# Patient Record
Sex: FEMALE | Race: WHITE | Employment: UNEMPLOYED | URBAN - METROPOLITAN AREA
[De-identification: names, ages, dates, MRNs, and addresses within clinical notes are randomized per-mention and may not be internally consistent; named-entity substitution may affect disease eponyms.]

---

## 2021-01-01 ENCOUNTER — APPOINTMENT (OUTPATIENT)
Dept: NUCLEAR MEDICINE | Age: 86
DRG: 064 | End: 2021-01-01
Attending: INTERNAL MEDICINE
Payer: MEDICARE

## 2021-01-01 ENCOUNTER — APPOINTMENT (OUTPATIENT)
Dept: NON INVASIVE DIAGNOSTICS | Age: 86
DRG: 064 | End: 2021-01-01
Attending: INTERNAL MEDICINE
Payer: MEDICARE

## 2021-01-01 ENCOUNTER — HOSPITAL ENCOUNTER (INPATIENT)
Age: 86
LOS: 15 days | Discharge: NURSING, REHAB OR LONG-TERM CARE | DRG: 064 | End: 2021-12-14
Attending: STUDENT IN AN ORGANIZED HEALTH CARE EDUCATION/TRAINING PROGRAM | Admitting: INTERNAL MEDICINE
Payer: MEDICARE

## 2021-01-01 ENCOUNTER — APPOINTMENT (OUTPATIENT)
Dept: CT IMAGING | Age: 86
DRG: 064 | End: 2021-01-01
Attending: INTERNAL MEDICINE
Payer: MEDICARE

## 2021-01-01 ENCOUNTER — APPOINTMENT (OUTPATIENT)
Dept: CT IMAGING | Age: 86
DRG: 064 | End: 2021-01-01
Attending: STUDENT IN AN ORGANIZED HEALTH CARE EDUCATION/TRAINING PROGRAM
Payer: MEDICARE

## 2021-01-01 ENCOUNTER — APPOINTMENT (OUTPATIENT)
Dept: VASCULAR SURGERY | Age: 86
DRG: 064 | End: 2021-01-01
Attending: PSYCHIATRY & NEUROLOGY
Payer: MEDICARE

## 2021-01-01 ENCOUNTER — APPOINTMENT (OUTPATIENT)
Dept: GENERAL RADIOLOGY | Age: 86
DRG: 064 | End: 2021-01-01
Attending: STUDENT IN AN ORGANIZED HEALTH CARE EDUCATION/TRAINING PROGRAM
Payer: MEDICARE

## 2021-01-01 ENCOUNTER — APPOINTMENT (OUTPATIENT)
Dept: MRI IMAGING | Age: 86
DRG: 064 | End: 2021-01-01
Attending: INTERNAL MEDICINE
Payer: MEDICARE

## 2021-01-01 VITALS
HEIGHT: 64 IN | SYSTOLIC BLOOD PRESSURE: 110 MMHG | DIASTOLIC BLOOD PRESSURE: 67 MMHG | RESPIRATION RATE: 20 BRPM | TEMPERATURE: 97.4 F | BODY MASS INDEX: 18.03 KG/M2 | OXYGEN SATURATION: 95 % | WEIGHT: 105.6 LBS | HEART RATE: 101 BPM

## 2021-01-01 DIAGNOSIS — R47.1 DYSARTHRIA: ICD-10-CM

## 2021-01-01 DIAGNOSIS — Z71.89 DNR (DO NOT RESUSCITATE) DISCUSSION: ICD-10-CM

## 2021-01-01 DIAGNOSIS — E78.2 MIXED HYPERLIPIDEMIA: ICD-10-CM

## 2021-01-01 DIAGNOSIS — A41.9 SEPSIS, DUE TO UNSPECIFIED ORGANISM, UNSPECIFIED WHETHER ACUTE ORGAN DYSFUNCTION PRESENT (HCC): ICD-10-CM

## 2021-01-01 DIAGNOSIS — N28.9 ACUTE RENAL INSUFFICIENCY: ICD-10-CM

## 2021-01-01 DIAGNOSIS — Z51.5 PALLIATIVE CARE ENCOUNTER: ICD-10-CM

## 2021-01-01 DIAGNOSIS — R64 CACHEXIA (HCC): ICD-10-CM

## 2021-01-01 DIAGNOSIS — I63.9 ACUTE CVA (CEREBROVASCULAR ACCIDENT) (HCC): ICD-10-CM

## 2021-01-01 DIAGNOSIS — R13.10 DYSPHAGIA, UNSPECIFIED TYPE: ICD-10-CM

## 2021-01-01 DIAGNOSIS — Z71.89 GOALS OF CARE, COUNSELING/DISCUSSION: ICD-10-CM

## 2021-01-01 DIAGNOSIS — D72.829 LEUKOCYTOSIS, UNSPECIFIED TYPE: ICD-10-CM

## 2021-01-01 DIAGNOSIS — R77.8 ELEVATED TROPONIN: ICD-10-CM

## 2021-01-01 DIAGNOSIS — R41.82 ALTERED MENTAL STATUS, UNSPECIFIED ALTERED MENTAL STATUS TYPE: Primary | ICD-10-CM

## 2021-01-01 DIAGNOSIS — E63.9 POOR NUTRITION: ICD-10-CM

## 2021-01-01 DIAGNOSIS — R53.1 WEAKNESS GENERALIZED: ICD-10-CM

## 2021-01-01 DIAGNOSIS — Z71.89 ADVANCED CARE PLANNING/COUNSELING DISCUSSION: ICD-10-CM

## 2021-01-01 DIAGNOSIS — D72.828 OTHER ELEVATED WHITE BLOOD CELL (WBC) COUNT: ICD-10-CM

## 2021-01-01 DIAGNOSIS — Z71.1 CONCERN ABOUT END OF LIFE: ICD-10-CM

## 2021-01-01 DIAGNOSIS — G93.41 ACUTE METABOLIC ENCEPHALOPATHY: ICD-10-CM

## 2021-01-01 DIAGNOSIS — E86.0 DEHYDRATION: ICD-10-CM

## 2021-01-01 DIAGNOSIS — I50.82 BIVENTRICULAR CHF (CONGESTIVE HEART FAILURE) (HCC): ICD-10-CM

## 2021-01-01 LAB
ALBUMIN SERPL-MCNC: 1.7 G/DL (ref 3.5–5)
ALBUMIN SERPL-MCNC: 1.9 G/DL (ref 3.5–5)
ALBUMIN SERPL-MCNC: 2.6 G/DL (ref 3.5–5)
ALBUMIN SERPL-MCNC: 2.9 G/DL (ref 3.5–5)
ALBUMIN/GLOB SERPL: 0.4 {RATIO} (ref 1.1–2.2)
ALBUMIN/GLOB SERPL: 0.4 {RATIO} (ref 1.1–2.2)
ALBUMIN/GLOB SERPL: 0.5 {RATIO} (ref 1.1–2.2)
ALBUMIN/GLOB SERPL: 0.5 {RATIO} (ref 1.1–2.2)
ALBUMIN/GLOB SERPL: 0.6 {RATIO} (ref 1.1–2.2)
ALBUMIN/GLOB SERPL: 0.7 {RATIO} (ref 1.1–2.2)
ALP SERPL-CCNC: 154 U/L (ref 45–117)
ALP SERPL-CCNC: 180 U/L (ref 45–117)
ALP SERPL-CCNC: 186 U/L (ref 45–117)
ALP SERPL-CCNC: 263 U/L (ref 45–117)
ALP SERPL-CCNC: 95 U/L (ref 45–117)
ALP SERPL-CCNC: 98 U/L (ref 45–117)
ALT SERPL-CCNC: 27 U/L (ref 12–78)
ALT SERPL-CCNC: 31 U/L (ref 12–78)
ALT SERPL-CCNC: 33 U/L (ref 12–78)
ALT SERPL-CCNC: 41 U/L (ref 12–78)
ALT SERPL-CCNC: 42 U/L (ref 12–78)
ALT SERPL-CCNC: 80 U/L (ref 12–78)
ANION GAP SERPL CALC-SCNC: 10 MMOL/L (ref 5–15)
ANION GAP SERPL CALC-SCNC: 11 MMOL/L (ref 5–15)
ANION GAP SERPL CALC-SCNC: 2 MMOL/L (ref 5–15)
ANION GAP SERPL CALC-SCNC: 3 MMOL/L (ref 5–15)
ANION GAP SERPL CALC-SCNC: 4 MMOL/L (ref 5–15)
ANION GAP SERPL CALC-SCNC: 5 MMOL/L (ref 5–15)
ANION GAP SERPL CALC-SCNC: 7 MMOL/L (ref 5–15)
ANION GAP SERPL CALC-SCNC: 8 MMOL/L (ref 5–15)
APPEARANCE UR: CLEAR
AST SERPL-CCNC: 128 U/L (ref 15–37)
AST SERPL-CCNC: 40 U/L (ref 15–37)
AST SERPL-CCNC: 51 U/L (ref 15–37)
AST SERPL-CCNC: 56 U/L (ref 15–37)
AST SERPL-CCNC: 71 U/L (ref 15–37)
AST SERPL-CCNC: 75 U/L (ref 15–37)
ATRIAL RATE: 147 BPM
ATRIAL RATE: 80 BPM
ATRIAL RATE: 81 BPM
ATRIAL RATE: 87 BPM
AV R PG: 28.17 MMHG
B PERT DNA SPEC QL NAA+PROBE: NOT DETECTED
BACKGROUND: 489207: ABNORMAL
BACTERIA SPEC CULT: NORMAL
BACTERIA SPEC CULT: NORMAL
BACTERIA URNS QL MICRO: NEGATIVE /HPF
BASOPHILS # BLD: 0 K/UL (ref 0–0.1)
BASOPHILS # BLD: 0.1 K/UL (ref 0–0.1)
BASOPHILS NFR BLD: 0 % (ref 0–1)
BILIRUB SERPL-MCNC: 0.7 MG/DL (ref 0.2–1)
BILIRUB SERPL-MCNC: 0.8 MG/DL (ref 0.2–1)
BILIRUB SERPL-MCNC: 1 MG/DL (ref 0.2–1)
BILIRUB SERPL-MCNC: 1.1 MG/DL (ref 0.2–1)
BILIRUB SERPL-MCNC: 1.1 MG/DL (ref 0.2–1)
BILIRUB SERPL-MCNC: 1.3 MG/DL (ref 0.2–1)
BILIRUB UR QL: NEGATIVE
BORDETELLA PARAPERTUSSIS PCR, BORPAR: NOT DETECTED
BUN SERPL-MCNC: 36 MG/DL (ref 6–20)
BUN SERPL-MCNC: 39 MG/DL (ref 6–20)
BUN SERPL-MCNC: 39 MG/DL (ref 6–20)
BUN SERPL-MCNC: 40 MG/DL (ref 6–20)
BUN SERPL-MCNC: 49 MG/DL (ref 6–20)
BUN SERPL-MCNC: 52 MG/DL (ref 6–20)
BUN SERPL-MCNC: 52 MG/DL (ref 6–20)
BUN SERPL-MCNC: 53 MG/DL (ref 6–20)
BUN/CREAT SERPL: 28 (ref 12–20)
BUN/CREAT SERPL: 35 (ref 12–20)
BUN/CREAT SERPL: 36 (ref 12–20)
BUN/CREAT SERPL: 39 (ref 12–20)
BUN/CREAT SERPL: 39 (ref 12–20)
BUN/CREAT SERPL: 40 (ref 12–20)
BUN/CREAT SERPL: 42 (ref 12–20)
BUN/CREAT SERPL: 42 (ref 12–20)
BUN/CREAT SERPL: 43 (ref 12–20)
BUN/CREAT SERPL: 44 (ref 12–20)
BUN/CREAT SERPL: 46 (ref 12–20)
C PNEUM DNA SPEC QL NAA+PROBE: NOT DETECTED
CALCIUM SERPL-MCNC: 8 MG/DL (ref 8.5–10.1)
CALCIUM SERPL-MCNC: 8.5 MG/DL (ref 8.5–10.1)
CALCIUM SERPL-MCNC: 8.6 MG/DL (ref 8.5–10.1)
CALCIUM SERPL-MCNC: 8.7 MG/DL (ref 8.5–10.1)
CALCIUM SERPL-MCNC: 8.8 MG/DL (ref 8.5–10.1)
CALCIUM SERPL-MCNC: 9 MG/DL (ref 8.5–10.1)
CALCIUM SERPL-MCNC: 9 MG/DL (ref 8.5–10.1)
CALCIUM SERPL-MCNC: 9.1 MG/DL (ref 8.5–10.1)
CALCIUM SERPL-MCNC: 9.2 MG/DL (ref 8.5–10.1)
CALCIUM SERPL-MCNC: 9.3 MG/DL (ref 8.5–10.1)
CALCIUM SERPL-MCNC: 9.6 MG/DL (ref 8.5–10.1)
CALCULATED P AXIS, ECG09: 60 DEGREES
CALCULATED P AXIS, ECG09: 77 DEGREES
CALCULATED P AXIS, ECG09: 81 DEGREES
CALCULATED R AXIS, ECG10: -143 DEGREES
CALCULATED R AXIS, ECG10: -56 DEGREES
CALCULATED R AXIS, ECG10: -56 DEGREES
CALCULATED R AXIS, ECG10: -66 DEGREES
CALCULATED T AXIS, ECG11: 121 DEGREES
CALCULATED T AXIS, ECG11: 151 DEGREES
CALCULATED T AXIS, ECG11: 61 DEGREES
CALCULATED T AXIS, ECG11: 80 DEGREES
CELLS ANALYZED: 200
CELLS COUNTED: 200
CHLORIDE SERPL-SCNC: 104 MMOL/L (ref 97–108)
CHLORIDE SERPL-SCNC: 111 MMOL/L (ref 97–108)
CHLORIDE SERPL-SCNC: 112 MMOL/L (ref 97–108)
CHLORIDE SERPL-SCNC: 113 MMOL/L (ref 97–108)
CHLORIDE SERPL-SCNC: 113 MMOL/L (ref 97–108)
CHLORIDE SERPL-SCNC: 115 MMOL/L (ref 97–108)
CHOLEST SERPL-MCNC: 164 MG/DL
CK SERPL-CCNC: 184 U/L (ref 26–192)
CO2 SERPL-SCNC: 19 MMOL/L (ref 21–32)
CO2 SERPL-SCNC: 20 MMOL/L (ref 21–32)
CO2 SERPL-SCNC: 22 MMOL/L (ref 21–32)
CO2 SERPL-SCNC: 22 MMOL/L (ref 21–32)
CO2 SERPL-SCNC: 24 MMOL/L (ref 21–32)
CO2 SERPL-SCNC: 25 MMOL/L (ref 21–32)
CO2 SERPL-SCNC: 25 MMOL/L (ref 21–32)
CO2 SERPL-SCNC: 26 MMOL/L (ref 21–32)
CO2 SERPL-SCNC: 27 MMOL/L (ref 21–32)
CO2 SERPL-SCNC: 27 MMOL/L (ref 21–32)
CO2 SERPL-SCNC: 28 MMOL/L (ref 21–32)
COLOR UR: ABNORMAL
COMMENT, HOLDF: NORMAL
COMMENT, HOLDF: NORMAL
COVID-19 RAPID TEST, COVR: ABNORMAL
CREAT SERPL-MCNC: 0.81 MG/DL (ref 0.55–1.02)
CREAT SERPL-MCNC: 0.87 MG/DL (ref 0.55–1.02)
CREAT SERPL-MCNC: 0.92 MG/DL (ref 0.55–1.02)
CREAT SERPL-MCNC: 0.92 MG/DL (ref 0.55–1.02)
CREAT SERPL-MCNC: 0.96 MG/DL (ref 0.55–1.02)
CREAT SERPL-MCNC: 1.03 MG/DL (ref 0.55–1.02)
CREAT SERPL-MCNC: 1.09 MG/DL (ref 0.55–1.02)
CREAT SERPL-MCNC: 1.26 MG/DL (ref 0.55–1.02)
CREAT SERPL-MCNC: 1.31 MG/DL (ref 0.55–1.02)
CREAT SERPL-MCNC: 1.48 MG/DL (ref 0.55–1.02)
CREAT SERPL-MCNC: 1.91 MG/DL (ref 0.55–1.02)
DIAGNOSIS, 93000: NORMAL
DIFFERENTIAL METHOD BLD: ABNORMAL
DIRECTOR REVIEW: 489204: ABNORMAL
DIRECTOR REVIEW: NORMAL
ECHO AO ASC DIAM: 3.05 CM
ECHO AR MAX VEL PISA: 265.36 CM/S
ECHO AV ANNULUS DIAM: 3.07 CM
ECHO AV AREA PEAK VELOCITY: 0.85 CM2
ECHO AV AREA/BSA PEAK VELOCITY: 0.6 CM2/M2
ECHO AV PEAK GRADIENT: 5.62 MMHG
ECHO AV PEAK VELOCITY: 118.59 CM/S
ECHO AV REGURGITANT PHT: 1045.71 MS
ECHO IVC PROX: 1.82 CM
ECHO LA AREA 4C: 11.91 CM2
ECHO LA MAJOR AXIS: 3.3 CM
ECHO LA MINOR AXIS: 2.24 CM
ECHO LA VOL 2C: 29.54 ML (ref 22–52)
ECHO LA VOL 4C: 20.71 ML (ref 22–52)
ECHO LA VOL BP: 29.57 ML (ref 22–52)
ECHO LA VOL/BSA BIPLANE: 20.12 ML/M2 (ref 16–28)
ECHO LA VOLUME INDEX A2C: 20.1 ML/M2 (ref 16–28)
ECHO LA VOLUME INDEX A4C: 14.09 ML/M2 (ref 16–28)
ECHO LV E' LATERAL VELOCITY: 5.6 CM/S
ECHO LV E' SEPTAL VELOCITY: 4.93 CM/S
ECHO LV EDV A2C: 154.37 ML
ECHO LV EDV A4C: 132.16 ML
ECHO LV EDV BP: 144.38 ML (ref 56–104)
ECHO LV EDV INDEX A4C: 89.9 ML/M2
ECHO LV EDV INDEX BP: 98.2 ML/M2
ECHO LV EDV NDEX A2C: 105 ML/M2
ECHO LV EJECTION FRACTION A2C: 28 PERCENT
ECHO LV EJECTION FRACTION A4C: 27 PERCENT
ECHO LV EJECTION FRACTION BIPLANE: 27.3 PERCENT (ref 55–100)
ECHO LV ESV A2C: 111.22 ML
ECHO LV ESV A4C: 96.6 ML
ECHO LV ESV BP: 104.94 ML (ref 19–49)
ECHO LV ESV INDEX A2C: 75.7 ML/M2
ECHO LV ESV INDEX A4C: 65.7 ML/M2
ECHO LV ESV INDEX BP: 71.4 ML/M2
ECHO LV INTERNAL DIMENSION DIASTOLIC: 5.86 CM (ref 3.9–5.3)
ECHO LV INTERNAL DIMENSION SYSTOLIC: 5.28 CM
ECHO LV IVSD: 0.91 CM (ref 0.6–0.9)
ECHO LV MASS 2D: 181.4 G (ref 67–162)
ECHO LV MASS INDEX 2D: 123.4 G/M2 (ref 43–95)
ECHO LV POSTERIOR WALL DIASTOLIC: 0.71 CM (ref 0.6–0.9)
ECHO LVOT DIAM: 1.62 CM
ECHO LVOT PEAK GRADIENT: 0.95 MMHG
ECHO LVOT PEAK VELOCITY: 48.66 CM/S
ECHO MV A VELOCITY: 66.36 CM/S
ECHO MV E DECELERATION TIME (DT): 87.61 MS
ECHO MV E VELOCITY: 122.33 CM/S
ECHO MV E/A RATIO: 1.84
ECHO MV E/E' LATERAL: 21.84
ECHO MV E/E' RATIO (AVERAGED): 23.33
ECHO MV E/E' SEPTAL: 24.81
ECHO PV MAX VELOCITY: 72.48 CM/S
ECHO PV PEAK INSTANTANEOUS GRADIENT SYSTOLIC: 2.1 MMHG
ECHO PV REGURGITANT MAX VELOCITY: 110.91 CM/S
ECHO RV INTERNAL DIMENSION: 3.1 CM
ECHO RV TAPSE: 1.03 CM (ref 1.5–2)
ECHO TV REGURGITANT MAX VELOCITY: 305.05 CM/S
ECHO TV REGURGITANT MAX VELOCITY: 556.66 CM/S
ECHO TV REGURGITANT PEAK GRADIENT: 37.29 MMHG
EOSINOPHIL # BLD: 0 K/UL (ref 0–0.4)
EOSINOPHIL NFR BLD: 0 % (ref 0–7)
EPITH CASTS URNS QL MICRO: ABNORMAL /LPF
EPO SERPL-ACNC: 4.1 MIU/ML (ref 2.6–18.5)
ERYTHROCYTE [DISTWIDTH] IN BLOOD BY AUTOMATED COUNT: 21.5 % (ref 11.5–14.5)
ERYTHROCYTE [DISTWIDTH] IN BLOOD BY AUTOMATED COUNT: 22 % (ref 11.5–14.5)
ERYTHROCYTE [DISTWIDTH] IN BLOOD BY AUTOMATED COUNT: 22.2 % (ref 11.5–14.5)
ERYTHROCYTE [DISTWIDTH] IN BLOOD BY AUTOMATED COUNT: 22.3 % (ref 11.5–14.5)
ERYTHROCYTE [DISTWIDTH] IN BLOOD BY AUTOMATED COUNT: 22.3 % (ref 11.5–14.5)
ERYTHROCYTE [DISTWIDTH] IN BLOOD BY AUTOMATED COUNT: 22.4 % (ref 11.5–14.5)
ERYTHROCYTE [DISTWIDTH] IN BLOOD BY AUTOMATED COUNT: 22.4 % (ref 11.5–14.5)
ERYTHROCYTE [DISTWIDTH] IN BLOOD BY AUTOMATED COUNT: 22.6 % (ref 11.5–14.5)
ERYTHROCYTE [DISTWIDTH] IN BLOOD BY AUTOMATED COUNT: 22.9 % (ref 11.5–14.5)
ERYTHROCYTE [DISTWIDTH] IN BLOOD BY AUTOMATED COUNT: 23.2 % (ref 11.5–14.5)
ERYTHROCYTE [DISTWIDTH] IN BLOOD BY AUTOMATED COUNT: 23.2 % (ref 11.5–14.5)
EST. AVERAGE GLUCOSE BLD GHB EST-MCNC: 117 MG/DL
FISH RESULT, BCRF4T: NORMAL
FLUAV H1 2009 PAND RNA SPEC QL NAA+PROBE: NOT DETECTED
FLUAV H1 RNA SPEC QL NAA+PROBE: NOT DETECTED
FLUAV H3 RNA SPEC QL NAA+PROBE: NOT DETECTED
FLUAV SUBTYP SPEC NAA+PROBE: NOT DETECTED
FLUBV RNA SPEC QL NAA+PROBE: NOT DETECTED
GLOBULIN SER CALC-MCNC: 3.9 G/DL (ref 2–4)
GLOBULIN SER CALC-MCNC: 4 G/DL (ref 2–4)
GLOBULIN SER CALC-MCNC: 4 G/DL (ref 2–4)
GLOBULIN SER CALC-MCNC: 4.3 G/DL (ref 2–4)
GLOBULIN SER CALC-MCNC: 4.4 G/DL (ref 2–4)
GLOBULIN SER CALC-MCNC: 4.5 G/DL (ref 2–4)
GLUCOSE SERPL-MCNC: 100 MG/DL (ref 65–100)
GLUCOSE SERPL-MCNC: 102 MG/DL (ref 65–100)
GLUCOSE SERPL-MCNC: 103 MG/DL (ref 65–100)
GLUCOSE SERPL-MCNC: 104 MG/DL (ref 65–100)
GLUCOSE SERPL-MCNC: 111 MG/DL (ref 65–100)
GLUCOSE SERPL-MCNC: 112 MG/DL (ref 65–100)
GLUCOSE SERPL-MCNC: 153 MG/DL (ref 65–100)
GLUCOSE SERPL-MCNC: 74 MG/DL (ref 65–100)
GLUCOSE SERPL-MCNC: 84 MG/DL (ref 65–100)
GLUCOSE SERPL-MCNC: 90 MG/DL (ref 65–100)
GLUCOSE SERPL-MCNC: 97 MG/DL (ref 65–100)
GLUCOSE UR STRIP.AUTO-MCNC: NEGATIVE MG/DL
HADV DNA SPEC QL NAA+PROBE: NOT DETECTED
HBA1C MFR BLD: 5.7 % (ref 4–5.6)
HCOV 229E RNA SPEC QL NAA+PROBE: NOT DETECTED
HCOV HKU1 RNA SPEC QL NAA+PROBE: NOT DETECTED
HCOV NL63 RNA SPEC QL NAA+PROBE: NOT DETECTED
HCOV OC43 RNA SPEC QL NAA+PROBE: NOT DETECTED
HCT VFR BLD AUTO: 43.3 % (ref 35–47)
HCT VFR BLD AUTO: 46.3 % (ref 35–47)
HCT VFR BLD AUTO: 47.8 % (ref 35–47)
HCT VFR BLD AUTO: 49.4 % (ref 35–47)
HCT VFR BLD AUTO: 49.6 % (ref 35–47)
HCT VFR BLD AUTO: 50.5 % (ref 35–47)
HCT VFR BLD AUTO: 50.5 % (ref 35–47)
HCT VFR BLD AUTO: 50.9 % (ref 35–47)
HCT VFR BLD AUTO: 55.2 % (ref 35–47)
HCT VFR BLD AUTO: 55.6 % (ref 35–47)
HCT VFR BLD AUTO: 56.9 % (ref 35–47)
HDLC SERPL-MCNC: 31 MG/DL
HDLC SERPL: 5.3 {RATIO} (ref 0–5)
HGB BLD-MCNC: 13.4 G/DL (ref 11.5–16)
HGB BLD-MCNC: 14.2 G/DL (ref 11.5–16)
HGB BLD-MCNC: 14.7 G/DL (ref 11.5–16)
HGB BLD-MCNC: 14.7 G/DL (ref 11.5–16)
HGB BLD-MCNC: 15.2 G/DL (ref 11.5–16)
HGB BLD-MCNC: 15.3 G/DL (ref 11.5–16)
HGB BLD-MCNC: 15.3 G/DL (ref 11.5–16)
HGB BLD-MCNC: 15.7 G/DL (ref 11.5–16)
HGB BLD-MCNC: 16.8 G/DL (ref 11.5–16)
HGB BLD-MCNC: 16.9 G/DL (ref 11.5–16)
HGB BLD-MCNC: 17.4 G/DL (ref 11.5–16)
HGB UR QL STRIP: ABNORMAL
HMPV RNA SPEC QL NAA+PROBE: NOT DETECTED
HPIV1 RNA SPEC QL NAA+PROBE: NOT DETECTED
HPIV2 RNA SPEC QL NAA+PROBE: NOT DETECTED
HPIV3 RNA SPEC QL NAA+PROBE: NOT DETECTED
HPIV4 RNA SPEC QL NAA+PROBE: NOT DETECTED
IMM GRANULOCYTES # BLD AUTO: 0 K/UL
IMM GRANULOCYTES # BLD AUTO: 0.2 K/UL (ref 0–0.04)
IMM GRANULOCYTES # BLD AUTO: 0.3 K/UL (ref 0–0.04)
IMM GRANULOCYTES # BLD AUTO: 0.4 K/UL (ref 0–0.04)
IMM GRANULOCYTES # BLD AUTO: 0.5 K/UL (ref 0–0.04)
IMM GRANULOCYTES # BLD AUTO: 0.6 K/UL (ref 0–0.04)
IMM GRANULOCYTES # BLD AUTO: 0.6 K/UL (ref 0–0.04)
IMM GRANULOCYTES NFR BLD AUTO: 0 %
IMM GRANULOCYTES NFR BLD AUTO: 1 % (ref 0–0.5)
IMM GRANULOCYTES NFR BLD AUTO: 1 % (ref 0–0.5)
IMM GRANULOCYTES NFR BLD AUTO: 2 % (ref 0–0.5)
INTERPRETATION: NORMAL
JAK2 P.V617F BLD/T QL: ABNORMAL
KETONES UR QL STRIP.AUTO: ABNORMAL MG/DL
LA VOL DISK BP: 27.21 ML (ref 22–52)
LACTATE SERPL-SCNC: 1.4 MMOL/L (ref 0.4–2)
LACTATE SERPL-SCNC: 2.1 MMOL/L (ref 0.4–2)
LACTATE SERPL-SCNC: 2.3 MMOL/L (ref 0.4–2)
LACTATE SERPL-SCNC: 2.5 MMOL/L (ref 0.4–2)
LACTATE SERPL-SCNC: 2.7 MMOL/L (ref 0.4–2)
LACTATE SERPL-SCNC: 2.7 MMOL/L (ref 0.4–2)
LACTATE SERPL-SCNC: 3.4 MMOL/L (ref 0.4–2)
LACTATE SERPL-SCNC: 4.6 MMOL/L (ref 0.4–2)
LDH SERPL L TO P-CCNC: 525 U/L (ref 81–246)
LDLC SERPL CALC-MCNC: 100 MG/DL (ref 0–100)
LEFT CCA DIST DIAS: 6 CM/S
LEFT CCA DIST SYS: 48.8 CM/S
LEFT CCA PROX DIAS: 0 CM/S
LEFT CCA PROX SYS: 36.1 CM/S
LEFT ECA DIAS: 4.27 CM/S
LEFT ECA SYS: 66.2 CM/S
LEFT ICA DIST DIAS: 5.1 CM/S
LEFT ICA DIST SYS: 52.9 CM/S
LEFT ICA MID DIAS: 7.6 CM/S
LEFT ICA MID SYS: 47.8 CM/S
LEFT ICA PROX DIAS: 12.8 CM/S
LEFT ICA PROX SYS: 80.1 CM/S
LEFT ICA/CCA SYS: 1.64
LEFT VERTEBRAL DIAS: 6.34 CM/S
LEFT VERTEBRAL SYS: 41.3 CM/S
LEUKOCYTE ESTERASE UR QL STRIP.AUTO: ABNORMAL
LYMPHOCYTES # BLD: 0.2 K/UL (ref 0.8–3.5)
LYMPHOCYTES # BLD: 0.3 K/UL (ref 0.8–3.5)
LYMPHOCYTES # BLD: 0.3 K/UL (ref 0.8–3.5)
LYMPHOCYTES # BLD: 0.4 K/UL (ref 0.8–3.5)
LYMPHOCYTES # BLD: 0.4 K/UL (ref 0.8–3.5)
LYMPHOCYTES # BLD: 0.5 K/UL (ref 0.8–3.5)
LYMPHOCYTES # BLD: 0.6 K/UL (ref 0.8–3.5)
LYMPHOCYTES # BLD: 0.7 K/UL (ref 0.8–3.5)
LYMPHOCYTES # BLD: 0.7 K/UL (ref 0.8–3.5)
LYMPHOCYTES NFR BLD: 1 % (ref 12–49)
LYMPHOCYTES NFR BLD: 2 % (ref 12–49)
M PNEUMO DNA SPEC QL NAA+PROBE: NOT DETECTED
MAGNESIUM SERPL-MCNC: 2 MG/DL (ref 1.6–2.4)
MAGNESIUM SERPL-MCNC: 2.2 MG/DL (ref 1.6–2.4)
MAGNESIUM SERPL-MCNC: 2.3 MG/DL (ref 1.6–2.4)
MCH RBC QN AUTO: 22.6 PG (ref 26–34)
MCH RBC QN AUTO: 22.8 PG (ref 26–34)
MCH RBC QN AUTO: 22.9 PG (ref 26–34)
MCH RBC QN AUTO: 23 PG (ref 26–34)
MCH RBC QN AUTO: 23.1 PG (ref 26–34)
MCH RBC QN AUTO: 23.1 PG (ref 26–34)
MCH RBC QN AUTO: 23.2 PG (ref 26–34)
MCH RBC QN AUTO: 23.3 PG (ref 26–34)
MCH RBC QN AUTO: 23.4 PG (ref 26–34)
MCHC RBC AUTO-ENTMCNC: 29.8 G/DL (ref 30–36.5)
MCHC RBC AUTO-ENTMCNC: 30.1 G/DL (ref 30–36.5)
MCHC RBC AUTO-ENTMCNC: 30.1 G/DL (ref 30–36.5)
MCHC RBC AUTO-ENTMCNC: 30.4 G/DL (ref 30–36.5)
MCHC RBC AUTO-ENTMCNC: 30.4 G/DL (ref 30–36.5)
MCHC RBC AUTO-ENTMCNC: 30.6 G/DL (ref 30–36.5)
MCHC RBC AUTO-ENTMCNC: 30.7 G/DL (ref 30–36.5)
MCHC RBC AUTO-ENTMCNC: 30.8 G/DL (ref 30–36.5)
MCHC RBC AUTO-ENTMCNC: 30.8 G/DL (ref 30–36.5)
MCHC RBC AUTO-ENTMCNC: 30.9 G/DL (ref 30–36.5)
MCHC RBC AUTO-ENTMCNC: 31.1 G/DL (ref 30–36.5)
MCV RBC AUTO: 74.2 FL (ref 80–99)
MCV RBC AUTO: 74.4 FL (ref 80–99)
MCV RBC AUTO: 74.8 FL (ref 80–99)
MCV RBC AUTO: 75.2 FL (ref 80–99)
MCV RBC AUTO: 75.6 FL (ref 80–99)
MCV RBC AUTO: 75.7 FL (ref 80–99)
MCV RBC AUTO: 76 FL (ref 80–99)
MCV RBC AUTO: 76.2 FL (ref 80–99)
MCV RBC AUTO: 76.5 FL (ref 80–99)
MCV RBC AUTO: 76.9 FL (ref 80–99)
MCV RBC AUTO: 77.1 FL (ref 80–99)
METAMYELOCYTES NFR BLD MANUAL: 1 %
MONOCYTES # BLD: 1 K/UL (ref 0–1)
MONOCYTES # BLD: 1.1 K/UL (ref 0–1)
MONOCYTES # BLD: 1.3 K/UL (ref 0–1)
MONOCYTES # BLD: 1.4 K/UL (ref 0–1)
MONOCYTES # BLD: 1.7 K/UL (ref 0–1)
MONOCYTES # BLD: 1.8 K/UL (ref 0–1)
MONOCYTES # BLD: 2 K/UL (ref 0–1)
MONOCYTES # BLD: 2.1 K/UL (ref 0–1)
MONOCYTES # BLD: 2.3 K/UL (ref 0–1)
MONOCYTES # BLD: 2.7 K/UL (ref 0–1)
MONOCYTES # BLD: 2.7 K/UL (ref 0–1)
MONOCYTES NFR BLD: 4 % (ref 5–13)
MONOCYTES NFR BLD: 5 % (ref 5–13)
MONOCYTES NFR BLD: 6 % (ref 5–13)
MONOCYTES NFR BLD: 7 % (ref 5–13)
MONOCYTES NFR BLD: 8 % (ref 5–13)
MYELOCYTES NFR BLD MANUAL: 1 %
NEUTS BAND NFR BLD MANUAL: 1 % (ref 0–6)
NEUTS BAND NFR BLD MANUAL: 1 % (ref 0–6)
NEUTS BAND NFR BLD MANUAL: 13 % (ref 0–6)
NEUTS BAND NFR BLD MANUAL: 20 % (ref 0–6)
NEUTS SEG # BLD: 18.9 K/UL (ref 1.8–8)
NEUTS SEG # BLD: 19.7 K/UL (ref 1.8–8)
NEUTS SEG # BLD: 21 K/UL (ref 1.8–8)
NEUTS SEG # BLD: 23.3 K/UL (ref 1.8–8)
NEUTS SEG # BLD: 23.4 K/UL (ref 1.8–8)
NEUTS SEG # BLD: 25.8 K/UL (ref 1.8–8)
NEUTS SEG # BLD: 26.3 K/UL (ref 1.8–8)
NEUTS SEG # BLD: 26.7 K/UL (ref 1.8–8)
NEUTS SEG # BLD: 26.9 K/UL (ref 1.8–8)
NEUTS SEG # BLD: 30.4 K/UL (ref 1.8–8)
NEUTS SEG # BLD: 30.9 K/UL (ref 1.8–8)
NEUTS SEG NFR BLD: 74 % (ref 32–75)
NEUTS SEG NFR BLD: 80 % (ref 32–75)
NEUTS SEG NFR BLD: 88 % (ref 32–75)
NEUTS SEG NFR BLD: 89 % (ref 32–75)
NEUTS SEG NFR BLD: 90 % (ref 32–75)
NEUTS SEG NFR BLD: 92 % (ref 32–75)
NITRITE UR QL STRIP.AUTO: NEGATIVE
NRBC # BLD: 0 K/UL (ref 0–0.01)
NRBC # BLD: 0.02 K/UL (ref 0–0.01)
NRBC # BLD: 0.03 K/UL (ref 0–0.01)
NRBC # BLD: 0.05 K/UL (ref 0–0.01)
NRBC # BLD: 0.09 K/UL (ref 0–0.01)
NRBC # BLD: 0.23 K/UL (ref 0–0.01)
NRBC # BLD: 0.27 K/UL (ref 0–0.01)
NRBC # BLD: 0.68 K/UL (ref 0–0.01)
NRBC # BLD: 1.08 K/UL (ref 0–0.01)
NRBC BLD-RTO: 0 PER 100 WBC
NRBC BLD-RTO: 0.1 PER 100 WBC
NRBC BLD-RTO: 0.1 PER 100 WBC
NRBC BLD-RTO: 0.2 PER 100 WBC
NRBC BLD-RTO: 0.3 PER 100 WBC
NRBC BLD-RTO: 0.7 PER 100 WBC
NRBC BLD-RTO: 1 PER 100 WBC
NRBC BLD-RTO: 2.3 PER 100 WBC
NRBC BLD-RTO: 3.2 PER 100 WBC
P-R INTERVAL, ECG05: 146 MS
P-R INTERVAL, ECG05: 148 MS
P-R INTERVAL, ECG05: 160 MS
PERIPHERAL SMEAR,PSM: NORMAL
PH UR STRIP: 5 [PH] (ref 5–8)
PHOSPHATE SERPL-MCNC: 2.6 MG/DL (ref 2.6–4.7)
PLATELET # BLD AUTO: 227 K/UL (ref 150–400)
PLATELET # BLD AUTO: 364 K/UL (ref 150–400)
PLATELET # BLD AUTO: 391 K/UL (ref 150–400)
PLATELET # BLD AUTO: 392 K/UL (ref 150–400)
PLATELET # BLD AUTO: 432 K/UL (ref 150–400)
PLATELET # BLD AUTO: 437 K/UL (ref 150–400)
PLATELET # BLD AUTO: 460 K/UL (ref 150–400)
PLATELET # BLD AUTO: 479 K/UL (ref 150–400)
PLATELET # BLD AUTO: 489 K/UL (ref 150–400)
PLATELET # BLD AUTO: 525 K/UL (ref 150–400)
PLATELET # BLD AUTO: 562 K/UL (ref 150–400)
PLATELET COMMENTS,PCOM: ABNORMAL
PMV BLD AUTO: ABNORMAL FL (ref 8.9–12.9)
PMV BLD AUTO: ABNORMAL FL (ref 8.9–12.9)
POTASSIUM SERPL-SCNC: 3.7 MMOL/L (ref 3.5–5.1)
POTASSIUM SERPL-SCNC: 4 MMOL/L (ref 3.5–5.1)
POTASSIUM SERPL-SCNC: 4.1 MMOL/L (ref 3.5–5.1)
POTASSIUM SERPL-SCNC: 4.3 MMOL/L (ref 3.5–5.1)
POTASSIUM SERPL-SCNC: 4.5 MMOL/L (ref 3.5–5.1)
POTASSIUM SERPL-SCNC: 4.6 MMOL/L (ref 3.5–5.1)
POTASSIUM SERPL-SCNC: 4.7 MMOL/L (ref 3.5–5.1)
POTASSIUM SERPL-SCNC: 6.2 MMOL/L (ref 3.5–5.1)
PROCALCITONIN SERPL-MCNC: 0.2 NG/ML
PROT SERPL-MCNC: 5.7 G/DL (ref 6.4–8.2)
PROT SERPL-MCNC: 5.8 G/DL (ref 6.4–8.2)
PROT SERPL-MCNC: 5.9 G/DL (ref 6.4–8.2)
PROT SERPL-MCNC: 6.4 G/DL (ref 6.4–8.2)
PROT SERPL-MCNC: 7 G/DL (ref 6.4–8.2)
PROT SERPL-MCNC: 7.2 G/DL (ref 6.4–8.2)
PROT UR STRIP-MCNC: 30 MG/DL
Q-T INTERVAL, ECG07: 340 MS
Q-T INTERVAL, ECG07: 374 MS
Q-T INTERVAL, ECG07: 390 MS
Q-T INTERVAL, ECG07: 394 MS
QRS DURATION, ECG06: 104 MS
QRS DURATION, ECG06: 106 MS
QRS DURATION, ECG06: 106 MS
QRS DURATION, ECG06: 118 MS
QTC CALCULATION (BEZET), ECG08: 434 MS
QTC CALCULATION (BEZET), ECG08: 443 MS
QTC CALCULATION (BEZET), ECG08: 449 MS
QTC CALCULATION (BEZET), ECG08: 474 MS
RBC # BLD AUTO: 5.73 M/UL (ref 3.8–5.2)
RBC # BLD AUTO: 6.19 M/UL (ref 3.8–5.2)
RBC # BLD AUTO: 6.44 M/UL (ref 3.8–5.2)
RBC # BLD AUTO: 6.5 M/UL (ref 3.8–5.2)
RBC # BLD AUTO: 6.55 M/UL (ref 3.8–5.2)
RBC # BLD AUTO: 6.6 M/UL (ref 3.8–5.2)
RBC # BLD AUTO: 6.62 M/UL (ref 3.8–5.2)
RBC # BLD AUTO: 6.79 M/UL (ref 3.8–5.2)
RBC # BLD AUTO: 7.27 M/UL (ref 3.8–5.2)
RBC # BLD AUTO: 7.29 M/UL (ref 3.8–5.2)
RBC # BLD AUTO: 7.47 M/UL (ref 3.8–5.2)
RBC #/AREA URNS HPF: ABNORMAL /HPF (ref 0–5)
RBC MORPH BLD: ABNORMAL
REFLEX: ABNORMAL
RIGHT CCA DIST DIAS: 6.4 CM/S
RIGHT CCA DIST SYS: 28.6 CM/S
RIGHT CCA PROX DIAS: 0 CM/S
RIGHT CCA PROX SYS: 32.5 CM/S
RIGHT ECA DIAS: 3.78 CM/S
RIGHT ECA SYS: 40.3 CM/S
RIGHT ICA DIST DIAS: 2.5 CM/S
RIGHT ICA DIST SYS: 74.2 CM/S
RIGHT ICA MID DIAS: 7.7 CM/S
RIGHT ICA MID SYS: 82 CM/S
RIGHT ICA PROX DIAS: 10.3 CM/S
RIGHT ICA PROX SYS: 65.1 CM/S
RIGHT ICA/CCA SYS: 2.9
RIGHT VERTEBRAL DIAS: 0 CM/S
RIGHT VERTEBRAL SYS: 30.5 CM/S
RSV RNA SPEC QL NAA+PROBE: NOT DETECTED
RV+EV RNA SPEC QL NAA+PROBE: NOT DETECTED
SAMPLES BEING HELD,HOLD: NORMAL
SAMPLES BEING HELD,HOLD: NORMAL
SARS-COV-2 PCR, COVPCR: NOT DETECTED
SARS-COV-2, COV2: NORMAL
SARS-COV-2, XPLCVT: NOT DETECTED
SERVICE CMNT-IMP: NORMAL
SERVICE CMNT-IMP: NORMAL
SODIUM SERPL-SCNC: 141 MMOL/L (ref 136–145)
SODIUM SERPL-SCNC: 142 MMOL/L (ref 136–145)
SODIUM SERPL-SCNC: 143 MMOL/L (ref 136–145)
SODIUM SERPL-SCNC: 144 MMOL/L (ref 136–145)
SODIUM SERPL-SCNC: 145 MMOL/L (ref 136–145)
SODIUM SERPL-SCNC: 145 MMOL/L (ref 136–145)
SODIUM SERPL-SCNC: 146 MMOL/L (ref 136–145)
SODIUM SERPL-SCNC: 147 MMOL/L (ref 136–145)
SOURCE, COVRS: NEGATIVE
SOURCE, COVRS: NORMAL
SP GR UR REFRACTOMETRY: 1.02 (ref 1–1.03)
SPECIMEN TYPE: NORMAL
TRIGL SERPL-MCNC: 165 MG/DL (ref ?–150)
TROPONIN-HIGH SENSITIVITY: 472 NG/L (ref 0–51)
TROPONIN-HIGH SENSITIVITY: 572 NG/L (ref 0–51)
TSH SERPL DL<=0.05 MIU/L-ACNC: 1.1 UIU/ML (ref 0.36–3.74)
UR CULT HOLD, URHOLD: NORMAL
UROBILINOGEN UR QL STRIP.AUTO: 1 EU/DL (ref 0.2–1)
VANCOMYCIN SERPL-MCNC: 4.1 UG/ML
VAS LEFT SUBCLAVIAN PROX EDV: 0 CM/S
VAS LEFT SUBCLAVIAN PROX PSV: 96.4 CM/S
VAS RIGHT SUBCLAVIAN PROX EDV: 0 CM/S
VAS RIGHT SUBCLAVIAN PROX PSV: 80.7 CM/S
VENTRICULAR RATE, ECG03: 117 BPM
VENTRICULAR RATE, ECG03: 76 BPM
VENTRICULAR RATE, ECG03: 80 BPM
VENTRICULAR RATE, ECG03: 81 BPM
VLDLC SERPL CALC-MCNC: 33 MG/DL
WBC # BLD AUTO: 21 K/UL (ref 3.6–11)
WBC # BLD AUTO: 21.4 K/UL (ref 3.6–11)
WBC # BLD AUTO: 23.4 K/UL (ref 3.6–11)
WBC # BLD AUTO: 24.8 K/UL (ref 3.6–11)
WBC # BLD AUTO: 26.2 K/UL (ref 3.6–11)
WBC # BLD AUTO: 28.7 K/UL (ref 3.6–11)
WBC # BLD AUTO: 28.7 K/UL (ref 3.6–11)
WBC # BLD AUTO: 29.2 K/UL (ref 3.6–11)
WBC # BLD AUTO: 30.2 K/UL (ref 3.6–11)
WBC # BLD AUTO: 34.2 K/UL (ref 3.6–11)
WBC # BLD AUTO: 34.3 K/UL (ref 3.6–11)
WBC URNS QL MICRO: ABNORMAL /HPF (ref 0–4)

## 2021-01-01 PROCEDURE — 71045 X-RAY EXAM CHEST 1 VIEW: CPT

## 2021-01-01 PROCEDURE — 99285 EMERGENCY DEPT VISIT HI MDM: CPT

## 2021-01-01 PROCEDURE — 96360 HYDRATION IV INFUSION INIT: CPT

## 2021-01-01 PROCEDURE — 74011250637 HC RX REV CODE- 250/637: Performed by: INTERNAL MEDICINE

## 2021-01-01 PROCEDURE — 83615 LACTATE (LD) (LDH) ENZYME: CPT

## 2021-01-01 PROCEDURE — 77030038269 HC DRN EXT URIN PURWCK BARD -A

## 2021-01-01 PROCEDURE — 85025 COMPLETE CBC W/AUTO DIFF WBC: CPT

## 2021-01-01 PROCEDURE — 84484 ASSAY OF TROPONIN QUANT: CPT

## 2021-01-01 PROCEDURE — 65660000000 HC RM CCU STEPDOWN

## 2021-01-01 PROCEDURE — 97165 OT EVAL LOW COMPLEX 30 MIN: CPT

## 2021-01-01 PROCEDURE — 81001 URINALYSIS AUTO W/SCOPE: CPT

## 2021-01-01 PROCEDURE — 99231 SBSQ HOSP IP/OBS SF/LOW 25: CPT | Performed by: NURSE PRACTITIONER

## 2021-01-01 PROCEDURE — 36415 COLL VENOUS BLD VENIPUNCTURE: CPT

## 2021-01-01 PROCEDURE — 93880 EXTRACRANIAL BILAT STUDY: CPT

## 2021-01-01 PROCEDURE — A9569 TECHNETIUM TC-99M AUTO WBC: HCPCS

## 2021-01-01 PROCEDURE — 74011000250 HC RX REV CODE- 250: Performed by: INTERNAL MEDICINE

## 2021-01-01 PROCEDURE — 99232 SBSQ HOSP IP/OBS MODERATE 35: CPT | Performed by: SPECIALIST

## 2021-01-01 PROCEDURE — 80053 COMPREHEN METABOLIC PANEL: CPT

## 2021-01-01 PROCEDURE — 74011250636 HC RX REV CODE- 250/636: Performed by: INTERNAL MEDICINE

## 2021-01-01 PROCEDURE — 74011250637 HC RX REV CODE- 250/637: Performed by: PSYCHIATRY & NEUROLOGY

## 2021-01-01 PROCEDURE — 83605 ASSAY OF LACTIC ACID: CPT

## 2021-01-01 PROCEDURE — 74011250637 HC RX REV CODE- 250/637: Performed by: NURSE PRACTITIONER

## 2021-01-01 PROCEDURE — 97530 THERAPEUTIC ACTIVITIES: CPT

## 2021-01-01 PROCEDURE — 99232 SBSQ HOSP IP/OBS MODERATE 35: CPT | Performed by: INTERNAL MEDICINE

## 2021-01-01 PROCEDURE — C9113 INJ PANTOPRAZOLE SODIUM, VIA: HCPCS | Performed by: INTERNAL MEDICINE

## 2021-01-01 PROCEDURE — 2709999900 HC NON-CHARGEABLE SUPPLY

## 2021-01-01 PROCEDURE — U0005 INFEC AGEN DETEC AMPLI PROBE: HCPCS

## 2021-01-01 PROCEDURE — 93005 ELECTROCARDIOGRAM TRACING: CPT

## 2021-01-01 PROCEDURE — 77030037877 HC DRSG MEPILEX >48IN BORD MOLN -A

## 2021-01-01 PROCEDURE — 83036 HEMOGLOBIN GLYCOSYLATED A1C: CPT

## 2021-01-01 PROCEDURE — 83735 ASSAY OF MAGNESIUM: CPT

## 2021-01-01 PROCEDURE — 80048 BASIC METABOLIC PNL TOTAL CA: CPT

## 2021-01-01 PROCEDURE — 65270000029 HC RM PRIVATE

## 2021-01-01 PROCEDURE — 99233 SBSQ HOSP IP/OBS HIGH 50: CPT | Performed by: NURSE PRACTITIONER

## 2021-01-01 PROCEDURE — 74011000250 HC RX REV CODE- 250: Performed by: STUDENT IN AN ORGANIZED HEALTH CARE EDUCATION/TRAINING PROGRAM

## 2021-01-01 PROCEDURE — 92522 EVALUATE SPEECH PRODUCTION: CPT

## 2021-01-01 PROCEDURE — 84443 ASSAY THYROID STIM HORMONE: CPT

## 2021-01-01 PROCEDURE — 82668 ASSAY OF ERYTHROPOIETIN: CPT

## 2021-01-01 PROCEDURE — 92526 ORAL FUNCTION THERAPY: CPT

## 2021-01-01 PROCEDURE — 51701 INSERT BLADDER CATHETER: CPT

## 2021-01-01 PROCEDURE — 70450 CT HEAD/BRAIN W/O DYE: CPT

## 2021-01-01 PROCEDURE — 92610 EVALUATE SWALLOWING FUNCTION: CPT

## 2021-01-01 PROCEDURE — 87040 BLOOD CULTURE FOR BACTERIA: CPT

## 2021-01-01 PROCEDURE — 97116 GAIT TRAINING THERAPY: CPT

## 2021-01-01 PROCEDURE — 95816 EEG AWAKE AND DROWSY: CPT | Performed by: PSYCHIATRY & NEUROLOGY

## 2021-01-01 PROCEDURE — 97161 PT EVAL LOW COMPLEX 20 MIN: CPT

## 2021-01-01 PROCEDURE — 99222 1ST HOSP IP/OBS MODERATE 55: CPT | Performed by: PSYCHIATRY & NEUROLOGY

## 2021-01-01 PROCEDURE — 80061 LIPID PANEL: CPT

## 2021-01-01 PROCEDURE — 92526 ORAL FUNCTION THERAPY: CPT | Performed by: SPEECH-LANGUAGE PATHOLOGIST

## 2021-01-01 PROCEDURE — 99232 SBSQ HOSP IP/OBS MODERATE 35: CPT | Performed by: PSYCHIATRY & NEUROLOGY

## 2021-01-01 PROCEDURE — 82550 ASSAY OF CK (CPK): CPT

## 2021-01-01 PROCEDURE — 93306 TTE W/DOPPLER COMPLETE: CPT | Performed by: STUDENT IN AN ORGANIZED HEALTH CARE EDUCATION/TRAINING PROGRAM

## 2021-01-01 PROCEDURE — 74176 CT ABD & PELVIS W/O CONTRAST: CPT

## 2021-01-01 PROCEDURE — 88271 CYTOGENETICS DNA PROBE: CPT

## 2021-01-01 PROCEDURE — 81270 JAK2 GENE: CPT

## 2021-01-01 PROCEDURE — 99223 1ST HOSP IP/OBS HIGH 75: CPT | Performed by: INTERNAL MEDICINE

## 2021-01-01 PROCEDURE — 84100 ASSAY OF PHOSPHORUS: CPT

## 2021-01-01 PROCEDURE — 97110 THERAPEUTIC EXERCISES: CPT

## 2021-01-01 PROCEDURE — 93306 TTE W/DOPPLER COMPLETE: CPT

## 2021-01-01 PROCEDURE — 84145 PROCALCITONIN (PCT): CPT

## 2021-01-01 PROCEDURE — 87635 SARS-COV-2 COVID-19 AMP PRB: CPT

## 2021-01-01 PROCEDURE — 77030011256 HC DRSG MEPILEX <16IN NO BORD MOLN -A

## 2021-01-01 PROCEDURE — 71250 CT THORAX DX C-: CPT

## 2021-01-01 PROCEDURE — 99233 SBSQ HOSP IP/OBS HIGH 50: CPT | Performed by: INTERNAL MEDICINE

## 2021-01-01 PROCEDURE — 80202 ASSAY OF VANCOMYCIN: CPT

## 2021-01-01 PROCEDURE — 94760 N-INVAS EAR/PLS OXIMETRY 1: CPT

## 2021-01-01 PROCEDURE — 77010033678 HC OXYGEN DAILY

## 2021-01-01 PROCEDURE — 70551 MRI BRAIN STEM W/O DYE: CPT

## 2021-01-01 PROCEDURE — 74011250636 HC RX REV CODE- 250/636: Performed by: STUDENT IN AN ORGANIZED HEALTH CARE EDUCATION/TRAINING PROGRAM

## 2021-01-01 PROCEDURE — 0202U NFCT DS 22 TRGT SARS-COV-2: CPT

## 2021-01-01 PROCEDURE — APPSS45 APP SPLIT SHARED TIME 31-45 MINUTES: Performed by: NURSE PRACTITIONER

## 2021-01-01 PROCEDURE — 97112 NEUROMUSCULAR REEDUCATION: CPT

## 2021-01-01 PROCEDURE — 99223 1ST HOSP IP/OBS HIGH 75: CPT | Performed by: NURSE PRACTITIONER

## 2021-01-01 RX ORDER — ATORVASTATIN CALCIUM 10 MG/1
10 TABLET, FILM COATED ORAL DAILY
Status: DISCONTINUED | OUTPATIENT
Start: 2021-01-01 | End: 2021-01-01 | Stop reason: HOSPADM

## 2021-01-01 RX ORDER — METOPROLOL SUCCINATE 25 MG/1
12.5 TABLET, EXTENDED RELEASE ORAL DAILY
COMMUNITY

## 2021-01-01 RX ORDER — ASPIRIN 325 MG
325 TABLET ORAL DAILY
Status: DISCONTINUED | OUTPATIENT
Start: 2021-01-01 | End: 2021-01-01 | Stop reason: HOSPADM

## 2021-01-01 RX ORDER — ACETAMINOPHEN 650 MG/1
650 SUPPOSITORY RECTAL
Status: DISCONTINUED | OUTPATIENT
Start: 2021-01-01 | End: 2021-01-01 | Stop reason: HOSPADM

## 2021-01-01 RX ORDER — SODIUM CHLORIDE 9 MG/ML
75 INJECTION, SOLUTION INTRAVENOUS CONTINUOUS
Status: DISCONTINUED | OUTPATIENT
Start: 2021-01-01 | End: 2021-01-01

## 2021-01-01 RX ORDER — HEPARIN SODIUM 1000 [USP'U]/ML
10000 INJECTION, SOLUTION INTRAVENOUS; SUBCUTANEOUS
Status: COMPLETED | OUTPATIENT
Start: 2021-01-01 | End: 2021-01-01

## 2021-01-01 RX ORDER — ONDANSETRON 2 MG/ML
4 INJECTION INTRAMUSCULAR; INTRAVENOUS
Status: DISCONTINUED | OUTPATIENT
Start: 2021-01-01 | End: 2021-01-01 | Stop reason: HOSPADM

## 2021-01-01 RX ORDER — ACETAMINOPHEN 325 MG/1
650 TABLET ORAL EVERY 6 HOURS
Status: DISCONTINUED | OUTPATIENT
Start: 2021-01-01 | End: 2021-01-01 | Stop reason: HOSPADM

## 2021-01-01 RX ORDER — HEPARIN SODIUM 5000 [USP'U]/ML
5000 INJECTION, SOLUTION INTRAVENOUS; SUBCUTANEOUS EVERY 8 HOURS
Status: DISCONTINUED | OUTPATIENT
Start: 2021-01-01 | End: 2021-01-01 | Stop reason: HOSPADM

## 2021-01-01 RX ORDER — CEFEPIME HYDROCHLORIDE 2 G/1
INJECTION, POWDER, FOR SOLUTION INTRAVENOUS
Status: DISPENSED
Start: 2021-01-01 | End: 2021-01-01

## 2021-01-01 RX ORDER — METOPROLOL SUCCINATE 25 MG/1
12.5 TABLET, EXTENDED RELEASE ORAL DAILY
Status: DISCONTINUED | OUTPATIENT
Start: 2021-01-01 | End: 2021-01-01

## 2021-01-01 RX ORDER — LEVOTHYROXINE SODIUM 100 UG/1
100 TABLET ORAL
COMMUNITY

## 2021-01-01 RX ORDER — DOXYCYCLINE HYCLATE 100 MG
100 TABLET ORAL EVERY 12 HOURS
Status: DISCONTINUED | OUTPATIENT
Start: 2021-01-01 | End: 2021-01-01

## 2021-01-01 RX ORDER — ACETAMINOPHEN 325 MG/1
650 TABLET ORAL
Status: DISCONTINUED | OUTPATIENT
Start: 2021-01-01 | End: 2021-01-01 | Stop reason: HOSPADM

## 2021-01-01 RX ORDER — PANTOPRAZOLE SODIUM 40 MG/1
40 TABLET, DELAYED RELEASE ORAL
Status: DISCONTINUED | OUTPATIENT
Start: 2021-01-01 | End: 2021-01-01 | Stop reason: HOSPADM

## 2021-01-01 RX ORDER — WATER FOR INJECTION,STERILE
VIAL (ML) INJECTION
Status: DISPENSED
Start: 2021-01-01 | End: 2021-01-01

## 2021-01-01 RX ORDER — METOPROLOL TARTRATE 5 MG/5ML
2.5 INJECTION INTRAVENOUS EVERY 6 HOURS
Status: DISCONTINUED | OUTPATIENT
Start: 2021-01-01 | End: 2021-01-01

## 2021-01-01 RX ORDER — METOPROLOL SUCCINATE 25 MG/1
25 TABLET, EXTENDED RELEASE ORAL DAILY
Status: DISCONTINUED | OUTPATIENT
Start: 2021-01-01 | End: 2021-01-01 | Stop reason: HOSPADM

## 2021-01-01 RX ORDER — HEPARIN SODIUM 1000 [USP'U]/ML
INJECTION, SOLUTION INTRAVENOUS; SUBCUTANEOUS
Status: DISPENSED
Start: 2021-01-01 | End: 2021-01-01

## 2021-01-01 RX ORDER — LEVOTHYROXINE SODIUM 100 UG/1
100 TABLET ORAL
Status: DISCONTINUED | OUTPATIENT
Start: 2021-01-01 | End: 2021-01-01 | Stop reason: HOSPADM

## 2021-01-01 RX ORDER — METRONIDAZOLE 250 MG/1
500 TABLET ORAL EVERY 12 HOURS
Status: DISCONTINUED | OUTPATIENT
Start: 2021-01-01 | End: 2021-01-01

## 2021-01-01 RX ADMIN — DOXYCYCLINE HYCLATE 100 MG: 100 TABLET, COATED ORAL at 22:47

## 2021-01-01 RX ADMIN — LEVOTHYROXINE SODIUM 100 MCG: 0.1 TABLET ORAL at 12:10

## 2021-01-01 RX ADMIN — HEPARIN SODIUM 5000 UNITS: 5000 INJECTION INTRAVENOUS; SUBCUTANEOUS at 06:08

## 2021-01-01 RX ADMIN — ACETAMINOPHEN 650 MG: 325 TABLET ORAL at 12:22

## 2021-01-01 RX ADMIN — ATORVASTATIN CALCIUM 10 MG: 10 TABLET, FILM COATED ORAL at 09:02

## 2021-01-01 RX ADMIN — METOPROLOL SUCCINATE 12.5 MG: 25 TABLET, EXTENDED RELEASE ORAL at 10:21

## 2021-01-01 RX ADMIN — ATORVASTATIN CALCIUM 10 MG: 10 TABLET, FILM COATED ORAL at 09:26

## 2021-01-01 RX ADMIN — ACETAMINOPHEN 650 MG: 325 TABLET ORAL at 12:36

## 2021-01-01 RX ADMIN — HEPARIN SODIUM 5000 UNITS: 5000 INJECTION INTRAVENOUS; SUBCUTANEOUS at 05:43

## 2021-01-01 RX ADMIN — HEPARIN SODIUM 5000 UNITS: 5000 INJECTION INTRAVENOUS; SUBCUTANEOUS at 13:21

## 2021-01-01 RX ADMIN — ACETAMINOPHEN 650 MG: 325 TABLET ORAL at 18:21

## 2021-01-01 RX ADMIN — ACETAMINOPHEN 650 MG: 325 TABLET ORAL at 05:43

## 2021-01-01 RX ADMIN — HEPARIN SODIUM 5000 UNITS: 5000 INJECTION INTRAVENOUS; SUBCUTANEOUS at 05:23

## 2021-01-01 RX ADMIN — PANTOPRAZOLE SODIUM 40 MG: 40 INJECTION, POWDER, FOR SOLUTION INTRAVENOUS at 10:13

## 2021-01-01 RX ADMIN — DOXYCYCLINE HYCLATE 100 MG: 100 TABLET, COATED ORAL at 12:23

## 2021-01-01 RX ADMIN — LEVOTHYROXINE SODIUM 100 MCG: 0.1 TABLET ORAL at 09:38

## 2021-01-01 RX ADMIN — HEPARIN SODIUM 5000 UNITS: 5000 INJECTION INTRAVENOUS; SUBCUTANEOUS at 14:10

## 2021-01-01 RX ADMIN — ASPIRIN 325 MG ORAL TABLET 325 MG: 325 PILL ORAL at 09:50

## 2021-01-01 RX ADMIN — PANTOPRAZOLE SODIUM 40 MG: 40 INJECTION, POWDER, FOR SOLUTION INTRAVENOUS at 10:21

## 2021-01-01 RX ADMIN — ATORVASTATIN CALCIUM 10 MG: 10 TABLET, FILM COATED ORAL at 09:00

## 2021-01-01 RX ADMIN — HEPARIN SODIUM 5000 UNITS: 5000 INJECTION INTRAVENOUS; SUBCUTANEOUS at 21:19

## 2021-01-01 RX ADMIN — ATORVASTATIN CALCIUM 10 MG: 10 TABLET, FILM COATED ORAL at 08:45

## 2021-01-01 RX ADMIN — PANTOPRAZOLE SODIUM 40 MG: 40 INJECTION, POWDER, FOR SOLUTION INTRAVENOUS at 08:44

## 2021-01-01 RX ADMIN — ACETAMINOPHEN 650 MG: 325 TABLET ORAL at 18:18

## 2021-01-01 RX ADMIN — DOXYCYCLINE HYCLATE 100 MG: 100 TABLET, COATED ORAL at 10:47

## 2021-01-01 RX ADMIN — ACETAMINOPHEN 650 MG: 325 TABLET ORAL at 00:08

## 2021-01-01 RX ADMIN — ACETAMINOPHEN 650 MG: 325 TABLET ORAL at 18:50

## 2021-01-01 RX ADMIN — HEPARIN SODIUM 5000 UNITS: 5000 INJECTION INTRAVENOUS; SUBCUTANEOUS at 06:00

## 2021-01-01 RX ADMIN — DOXYCYCLINE HYCLATE 100 MG: 100 TABLET, COATED ORAL at 21:23

## 2021-01-01 RX ADMIN — WATER 2 G: 1 INJECTION INTRAMUSCULAR; INTRAVENOUS; SUBCUTANEOUS at 12:40

## 2021-01-01 RX ADMIN — HEPARIN SODIUM 5000 UNITS: 5000 INJECTION INTRAVENOUS; SUBCUTANEOUS at 22:00

## 2021-01-01 RX ADMIN — ATORVASTATIN CALCIUM 10 MG: 10 TABLET, FILM COATED ORAL at 09:38

## 2021-01-01 RX ADMIN — HEPARIN SODIUM 5000 UNITS: 5000 INJECTION INTRAVENOUS; SUBCUTANEOUS at 22:48

## 2021-01-01 RX ADMIN — METOPROLOL SUCCINATE 25 MG: 25 TABLET, EXTENDED RELEASE ORAL at 09:01

## 2021-01-01 RX ADMIN — DOXYCYCLINE HYCLATE 100 MG: 100 TABLET, COATED ORAL at 22:02

## 2021-01-01 RX ADMIN — ACETAMINOPHEN 650 MG: 325 TABLET ORAL at 17:42

## 2021-01-01 RX ADMIN — ASPIRIN 325 MG ORAL TABLET 325 MG: 325 PILL ORAL at 09:38

## 2021-01-01 RX ADMIN — HEPARIN SODIUM 5000 UNITS: 5000 INJECTION INTRAVENOUS; SUBCUTANEOUS at 13:56

## 2021-01-01 RX ADMIN — VANCOMYCIN HYDROCHLORIDE 750 MG: 750 INJECTION, POWDER, LYOPHILIZED, FOR SOLUTION INTRAVENOUS at 16:23

## 2021-01-01 RX ADMIN — ACETAMINOPHEN 650 MG: 325 TABLET ORAL at 05:57

## 2021-01-01 RX ADMIN — HEPARIN SODIUM 5000 UNITS: 5000 INJECTION INTRAVENOUS; SUBCUTANEOUS at 05:28

## 2021-01-01 RX ADMIN — ASPIRIN 325 MG ORAL TABLET 325 MG: 325 PILL ORAL at 10:02

## 2021-01-01 RX ADMIN — METRONIDAZOLE 500 MG: 250 TABLET ORAL at 20:12

## 2021-01-01 RX ADMIN — ATORVASTATIN CALCIUM 10 MG: 10 TABLET, FILM COATED ORAL at 15:37

## 2021-01-01 RX ADMIN — ACETAMINOPHEN 650 MG: 325 TABLET ORAL at 06:08

## 2021-01-01 RX ADMIN — ACETAMINOPHEN 650 MG: 325 TABLET ORAL at 12:45

## 2021-01-01 RX ADMIN — HEPARIN SODIUM 5000 UNITS: 5000 INJECTION INTRAVENOUS; SUBCUTANEOUS at 16:14

## 2021-01-01 RX ADMIN — ASPIRIN 325 MG ORAL TABLET 325 MG: 325 PILL ORAL at 08:44

## 2021-01-01 RX ADMIN — DOXYCYCLINE HYCLATE 100 MG: 100 TABLET, COATED ORAL at 08:44

## 2021-01-01 RX ADMIN — METOPROLOL SUCCINATE 12.5 MG: 25 TABLET, EXTENDED RELEASE ORAL at 09:50

## 2021-01-01 RX ADMIN — PANTOPRAZOLE SODIUM 40 MG: 40 TABLET, DELAYED RELEASE ORAL at 08:44

## 2021-01-01 RX ADMIN — ACETAMINOPHEN 650 MG: 325 TABLET ORAL at 14:29

## 2021-01-01 RX ADMIN — ACETAMINOPHEN 650 MG: 325 TABLET ORAL at 18:07

## 2021-01-01 RX ADMIN — METOPROLOL SUCCINATE 12.5 MG: 25 TABLET, EXTENDED RELEASE ORAL at 10:13

## 2021-01-01 RX ADMIN — ATORVASTATIN CALCIUM 10 MG: 10 TABLET, FILM COATED ORAL at 09:44

## 2021-01-01 RX ADMIN — METOPROLOL TARTRATE 2.5 MG: 5 INJECTION INTRAVENOUS at 21:08

## 2021-01-01 RX ADMIN — ASPIRIN 325 MG ORAL TABLET 325 MG: 325 PILL ORAL at 09:44

## 2021-01-01 RX ADMIN — PANTOPRAZOLE SODIUM 40 MG: 40 TABLET, DELAYED RELEASE ORAL at 07:09

## 2021-01-01 RX ADMIN — ATORVASTATIN CALCIUM 10 MG: 10 TABLET, FILM COATED ORAL at 10:21

## 2021-01-01 RX ADMIN — HEPARIN SODIUM 5000 UNITS: 5000 INJECTION INTRAVENOUS; SUBCUTANEOUS at 13:07

## 2021-01-01 RX ADMIN — HEPARIN SODIUM 5000 UNITS: 5000 INJECTION INTRAVENOUS; SUBCUTANEOUS at 15:06

## 2021-01-01 RX ADMIN — HEPARIN SODIUM 5000 UNITS: 5000 INJECTION INTRAVENOUS; SUBCUTANEOUS at 13:24

## 2021-01-01 RX ADMIN — ACETAMINOPHEN 650 MG: 325 TABLET ORAL at 18:31

## 2021-01-01 RX ADMIN — HEPARIN SODIUM 5000 UNITS: 5000 INJECTION INTRAVENOUS; SUBCUTANEOUS at 21:08

## 2021-01-01 RX ADMIN — HEPARIN SODIUM 5000 UNITS: 5000 INJECTION INTRAVENOUS; SUBCUTANEOUS at 21:23

## 2021-01-01 RX ADMIN — ACETAMINOPHEN 650 MG: 325 TABLET ORAL at 13:24

## 2021-01-01 RX ADMIN — ASPIRIN 325 MG ORAL TABLET 325 MG: 325 PILL ORAL at 09:01

## 2021-01-01 RX ADMIN — HEPARIN SODIUM 5000 UNITS: 5000 INJECTION INTRAVENOUS; SUBCUTANEOUS at 15:37

## 2021-01-01 RX ADMIN — HEPARIN SODIUM 5000 UNITS: 5000 INJECTION INTRAVENOUS; SUBCUTANEOUS at 14:05

## 2021-01-01 RX ADMIN — ATORVASTATIN CALCIUM 10 MG: 10 TABLET, FILM COATED ORAL at 08:25

## 2021-01-01 RX ADMIN — DOXYCYCLINE HYCLATE 100 MG: 100 TABLET, COATED ORAL at 09:38

## 2021-01-01 RX ADMIN — ACETAMINOPHEN 650 MG: 325 TABLET ORAL at 05:44

## 2021-01-01 RX ADMIN — HEPARIN SODIUM 5000 UNITS: 5000 INJECTION INTRAVENOUS; SUBCUTANEOUS at 13:06

## 2021-01-01 RX ADMIN — HEPARIN SODIUM 5000 UNITS: 5000 INJECTION INTRAVENOUS; SUBCUTANEOUS at 05:16

## 2021-01-01 RX ADMIN — PANTOPRAZOLE SODIUM 40 MG: 40 INJECTION, POWDER, FOR SOLUTION INTRAVENOUS at 09:12

## 2021-01-01 RX ADMIN — HEPARIN SODIUM 5000 UNITS: 5000 INJECTION INTRAVENOUS; SUBCUTANEOUS at 05:19

## 2021-01-01 RX ADMIN — HEPARIN SODIUM 5000 UNITS: 5000 INJECTION INTRAVENOUS; SUBCUTANEOUS at 21:02

## 2021-01-01 RX ADMIN — HEPARIN SODIUM 5000 UNITS: 5000 INJECTION INTRAVENOUS; SUBCUTANEOUS at 20:12

## 2021-01-01 RX ADMIN — LEVOTHYROXINE SODIUM 100 MCG: 0.1 TABLET ORAL at 06:08

## 2021-01-01 RX ADMIN — WATER 2 G: 1 INJECTION INTRAMUSCULAR; INTRAVENOUS; SUBCUTANEOUS at 12:29

## 2021-01-01 RX ADMIN — METOPROLOL SUCCINATE 25 MG: 25 TABLET, EXTENDED RELEASE ORAL at 08:45

## 2021-01-01 RX ADMIN — HEPARIN SODIUM 5000 UNITS: 5000 INJECTION INTRAVENOUS; SUBCUTANEOUS at 22:02

## 2021-01-01 RX ADMIN — DOXYCYCLINE HYCLATE 100 MG: 100 TABLET, COATED ORAL at 22:04

## 2021-01-01 RX ADMIN — HEPARIN SODIUM 5000 UNITS: 5000 INJECTION INTRAVENOUS; SUBCUTANEOUS at 06:49

## 2021-01-01 RX ADMIN — ACETAMINOPHEN 650 MG: 325 TABLET ORAL at 05:16

## 2021-01-01 RX ADMIN — LEVOTHYROXINE SODIUM 100 MCG: 0.1 TABLET ORAL at 05:47

## 2021-01-01 RX ADMIN — ACETAMINOPHEN 650 MG: 325 TABLET ORAL at 11:06

## 2021-01-01 RX ADMIN — ASPIRIN 325 MG ORAL TABLET 325 MG: 325 PILL ORAL at 10:47

## 2021-01-01 RX ADMIN — HEPARIN SODIUM 2000 UNITS: 1000 INJECTION INTRAVENOUS; SUBCUTANEOUS at 09:55

## 2021-01-01 RX ADMIN — METOPROLOL SUCCINATE 12.5 MG: 25 TABLET, EXTENDED RELEASE ORAL at 08:44

## 2021-01-01 RX ADMIN — HEPARIN SODIUM 5000 UNITS: 5000 INJECTION INTRAVENOUS; SUBCUTANEOUS at 06:18

## 2021-01-01 RX ADMIN — ACETAMINOPHEN 650 MG: 325 TABLET ORAL at 11:25

## 2021-01-01 RX ADMIN — SODIUM CHLORIDE 1000 ML: 9 INJECTION, SOLUTION INTRAVENOUS at 14:25

## 2021-01-01 RX ADMIN — ACETAMINOPHEN 650 MG: 325 TABLET ORAL at 21:19

## 2021-01-01 RX ADMIN — ACETAMINOPHEN 650 MG: 325 TABLET ORAL at 19:10

## 2021-01-01 RX ADMIN — VANCOMYCIN HYDROCHLORIDE 750 MG: 750 INJECTION, POWDER, LYOPHILIZED, FOR SOLUTION INTRAVENOUS at 18:12

## 2021-01-01 RX ADMIN — LEVOTHYROXINE SODIUM 100 MCG: 0.1 TABLET ORAL at 06:16

## 2021-01-01 RX ADMIN — ACETAMINOPHEN 650 MG: 325 TABLET ORAL at 22:09

## 2021-01-01 RX ADMIN — SODIUM CHLORIDE 1000 ML: 9 INJECTION, SOLUTION INTRAVENOUS at 16:05

## 2021-01-01 RX ADMIN — ASPIRIN 325 MG ORAL TABLET 325 MG: 325 PILL ORAL at 08:52

## 2021-01-01 RX ADMIN — ACETAMINOPHEN 650 MG: 325 TABLET ORAL at 22:48

## 2021-01-01 RX ADMIN — ACETAMINOPHEN 650 MG: 325 TABLET ORAL at 06:39

## 2021-01-01 RX ADMIN — ACETAMINOPHEN 650 MG: 325 TABLET ORAL at 05:23

## 2021-01-01 RX ADMIN — METOPROLOL SUCCINATE 12.5 MG: 25 TABLET, EXTENDED RELEASE ORAL at 09:01

## 2021-01-01 RX ADMIN — HEPARIN SODIUM 5000 UNITS: 5000 INJECTION INTRAVENOUS; SUBCUTANEOUS at 15:56

## 2021-01-01 RX ADMIN — HEPARIN SODIUM 5000 UNITS: 5000 INJECTION INTRAVENOUS; SUBCUTANEOUS at 14:29

## 2021-01-01 RX ADMIN — ASPIRIN 325 MG ORAL TABLET 325 MG: 325 PILL ORAL at 16:55

## 2021-01-01 RX ADMIN — ACETAMINOPHEN 650 MG: 325 TABLET ORAL at 07:09

## 2021-01-01 RX ADMIN — DOXYCYCLINE HYCLATE 100 MG: 100 TABLET, COATED ORAL at 21:08

## 2021-01-01 RX ADMIN — LEVOTHYROXINE SODIUM 100 MCG: 0.1 TABLET ORAL at 05:23

## 2021-01-01 RX ADMIN — METOPROLOL TARTRATE 2.5 MG: 5 INJECTION INTRAVENOUS at 01:10

## 2021-01-01 RX ADMIN — METOPROLOL TARTRATE 2.5 MG: 5 INJECTION INTRAVENOUS at 14:22

## 2021-01-01 RX ADMIN — LEVOTHYROXINE SODIUM 100 MCG: 0.1 TABLET ORAL at 05:17

## 2021-01-01 RX ADMIN — ACETAMINOPHEN 650 MG: 325 TABLET ORAL at 12:30

## 2021-01-01 RX ADMIN — HEPARIN SODIUM 5000 UNITS: 5000 INJECTION INTRAVENOUS; SUBCUTANEOUS at 06:36

## 2021-01-01 RX ADMIN — LEVOTHYROXINE SODIUM 100 MCG: 0.1 TABLET ORAL at 05:29

## 2021-01-01 RX ADMIN — WATER 2 G: 1 INJECTION INTRAMUSCULAR; INTRAVENOUS; SUBCUTANEOUS at 12:10

## 2021-01-01 RX ADMIN — LEVOTHYROXINE SODIUM 100 MCG: 0.1 TABLET ORAL at 06:53

## 2021-01-01 RX ADMIN — ACETAMINOPHEN 650 MG: 325 TABLET ORAL at 00:00

## 2021-01-01 RX ADMIN — HEPARIN SODIUM 5000 UNITS: 5000 INJECTION INTRAVENOUS; SUBCUTANEOUS at 21:09

## 2021-01-01 RX ADMIN — METRONIDAZOLE 500 MG: 250 TABLET ORAL at 08:25

## 2021-01-01 RX ADMIN — HEPARIN SODIUM 5000 UNITS: 5000 INJECTION INTRAVENOUS; SUBCUTANEOUS at 07:09

## 2021-01-01 RX ADMIN — ATORVASTATIN CALCIUM 10 MG: 10 TABLET, FILM COATED ORAL at 08:43

## 2021-01-01 RX ADMIN — ACETAMINOPHEN 650 MG: 325 TABLET ORAL at 13:48

## 2021-01-01 RX ADMIN — ATORVASTATIN CALCIUM 10 MG: 10 TABLET, FILM COATED ORAL at 09:12

## 2021-01-01 RX ADMIN — LEVOTHYROXINE SODIUM 100 MCG: 0.1 TABLET ORAL at 05:20

## 2021-01-01 RX ADMIN — ACETAMINOPHEN 650 MG: 325 TABLET ORAL at 00:28

## 2021-01-01 RX ADMIN — PANTOPRAZOLE SODIUM 40 MG: 40 TABLET, DELAYED RELEASE ORAL at 05:58

## 2021-01-01 RX ADMIN — HEPARIN SODIUM 5000 UNITS: 5000 INJECTION INTRAVENOUS; SUBCUTANEOUS at 22:04

## 2021-01-01 RX ADMIN — POTASSIUM BICARBONATE 20 MEQ: 782 TABLET, EFFERVESCENT ORAL at 16:00

## 2021-01-01 RX ADMIN — LEVOTHYROXINE SODIUM 100 MCG: 0.1 TABLET ORAL at 06:21

## 2021-01-01 RX ADMIN — HEPARIN SODIUM 5000 UNITS: 5000 INJECTION INTRAVENOUS; SUBCUTANEOUS at 06:21

## 2021-01-01 RX ADMIN — METOPROLOL SUCCINATE 12.5 MG: 25 TABLET, EXTENDED RELEASE ORAL at 09:12

## 2021-01-01 RX ADMIN — WATER 2 G: 1 INJECTION INTRAMUSCULAR; INTRAVENOUS; SUBCUTANEOUS at 15:55

## 2021-01-01 RX ADMIN — DOXYCYCLINE HYCLATE 100 MG: 100 TABLET, COATED ORAL at 09:01

## 2021-01-01 RX ADMIN — ACETAMINOPHEN 650 MG: 325 TABLET ORAL at 23:53

## 2021-01-01 RX ADMIN — LEVOTHYROXINE SODIUM 100 MCG: 0.1 TABLET ORAL at 07:09

## 2021-01-01 RX ADMIN — ACETAMINOPHEN 650 MG: 325 TABLET ORAL at 06:18

## 2021-01-01 RX ADMIN — ATORVASTATIN CALCIUM 10 MG: 10 TABLET, FILM COATED ORAL at 10:13

## 2021-01-01 RX ADMIN — LEVOTHYROXINE SODIUM 100 MCG: 0.1 TABLET ORAL at 05:58

## 2021-01-01 RX ADMIN — ACETAMINOPHEN 650 MG: 325 TABLET ORAL at 05:28

## 2021-01-01 RX ADMIN — ACETAMINOPHEN 650 MG: 325 TABLET ORAL at 06:21

## 2021-01-01 RX ADMIN — METRONIDAZOLE 500 MG: 250 TABLET ORAL at 21:09

## 2021-01-01 RX ADMIN — ACETAMINOPHEN 650 MG: 325 TABLET ORAL at 17:30

## 2021-01-01 RX ADMIN — ATORVASTATIN CALCIUM 10 MG: 10 TABLET, FILM COATED ORAL at 08:52

## 2021-01-01 RX ADMIN — ASPIRIN 325 MG ORAL TABLET 325 MG: 325 PILL ORAL at 09:12

## 2021-01-01 RX ADMIN — ACETAMINOPHEN 650 MG: 325 TABLET ORAL at 23:38

## 2021-01-01 RX ADMIN — METRONIDAZOLE 500 MG: 250 TABLET ORAL at 09:26

## 2021-01-01 RX ADMIN — METRONIDAZOLE 500 MG: 250 TABLET ORAL at 12:30

## 2021-01-01 RX ADMIN — ACETAMINOPHEN 650 MG: 325 TABLET ORAL at 12:39

## 2021-01-01 RX ADMIN — HEPARIN SODIUM 5000 UNITS: 5000 INJECTION INTRAVENOUS; SUBCUTANEOUS at 14:00

## 2021-01-01 RX ADMIN — ATORVASTATIN CALCIUM 10 MG: 10 TABLET, FILM COATED ORAL at 09:50

## 2021-01-01 RX ADMIN — HEPARIN SODIUM 5000 UNITS: 5000 INJECTION INTRAVENOUS; SUBCUTANEOUS at 06:01

## 2021-01-01 RX ADMIN — HEPARIN SODIUM 5000 UNITS: 5000 INJECTION INTRAVENOUS; SUBCUTANEOUS at 22:10

## 2021-01-01 RX ADMIN — ATORVASTATIN CALCIUM 10 MG: 10 TABLET, FILM COATED ORAL at 10:47

## 2021-01-01 RX ADMIN — HEPARIN SODIUM 5000 UNITS: 5000 INJECTION INTRAVENOUS; SUBCUTANEOUS at 22:23

## 2021-01-01 RX ADMIN — ACETAMINOPHEN 650 MG: 325 TABLET ORAL at 05:19

## 2021-01-01 RX ADMIN — HEPARIN SODIUM 5000 UNITS: 5000 INJECTION INTRAVENOUS; SUBCUTANEOUS at 14:21

## 2021-01-01 RX ADMIN — ACETAMINOPHEN 650 MG: 325 TABLET ORAL at 06:00

## 2021-01-01 RX ADMIN — ASPIRIN 325 MG ORAL TABLET 325 MG: 325 PILL ORAL at 10:21

## 2021-01-01 RX ADMIN — ASPIRIN 325 MG ORAL TABLET 325 MG: 325 PILL ORAL at 09:00

## 2021-01-01 RX ADMIN — HEPARIN SODIUM 5000 UNITS: 5000 INJECTION INTRAVENOUS; SUBCUTANEOUS at 05:57

## 2021-01-01 RX ADMIN — HEPARIN SODIUM 5000 UNITS: 5000 INJECTION INTRAVENOUS; SUBCUTANEOUS at 01:00

## 2021-01-01 RX ADMIN — ACETAMINOPHEN 650 MG: 325 TABLET ORAL at 11:56

## 2021-01-01 RX ADMIN — METOPROLOL SUCCINATE 12.5 MG: 25 TABLET, EXTENDED RELEASE ORAL at 09:43

## 2021-01-01 RX ADMIN — ASPIRIN 325 MG ORAL TABLET 325 MG: 325 PILL ORAL at 10:13

## 2021-01-01 RX ADMIN — ASPIRIN 325 MG ORAL TABLET 325 MG: 325 PILL ORAL at 08:25

## 2021-01-01 RX ADMIN — ASPIRIN 325 MG ORAL TABLET 325 MG: 325 PILL ORAL at 09:26

## 2021-01-01 RX ADMIN — DOXYCYCLINE HYCLATE 100 MG: 100 TABLET, COATED ORAL at 10:13

## 2021-01-01 RX ADMIN — HEPARIN SODIUM 5000 UNITS: 5000 INJECTION INTRAVENOUS; SUBCUTANEOUS at 13:54

## 2021-01-01 RX ADMIN — ACETAMINOPHEN 650 MG: 325 TABLET ORAL at 11:10

## 2021-01-01 RX ADMIN — ACETAMINOPHEN 650 MG: 325 TABLET ORAL at 17:10

## 2021-11-29 PROBLEM — R47.1 DYSARTHRIA: Status: ACTIVE | Noted: 2021-01-01

## 2021-11-29 PROBLEM — R41.82 ALTERED MENTAL STATE: Status: ACTIVE | Noted: 2021-01-01

## 2021-11-29 PROBLEM — R77.8 ELEVATED TROPONIN: Status: ACTIVE | Noted: 2021-01-01

## 2021-11-29 NOTE — H&P
Roderick Adkins [facility] [place] [address]  [address]  [phone]    Admission History and Physical      NAME:  David Sorensen   :   1928   MRN:  [id number]     PCP:  Unknown, MD Isaura     Date/Time of service:  2021  3:05 PM        Subjective:     CHIEF COMPLAINT: altered mental status    HISTORY OF PRESENT ILLNESS:     Ms. Cinda Chaudhry is a 80 y.o. female who is admitted with altered mentation and slurred speech. EMS was called by someone who came to her house to check on her so we do not know her last known normal. History obtained primarily from chart. Patient's only complaint is weakness but she says that has been an ongoing issue. She states \"this happens at times\" but does not elaborate much on what she means despite probing. No Known Allergies    Prior to Admission medications    Medication Sig Start Date End Date Taking? Authorizing Provider   aspirin delayed-release 81 mg tablet Take  by mouth daily. Provider, Historical   carvedilol (COREG) 3.125 mg tablet Take  by mouth two (2) times daily (with meals). Provider, Historical   furosemide (LASIX) 40 mg tablet Take  by mouth daily. Provider, Historical   levothyroxine (SYNTHROID) 50 mcg tablet Take  by mouth Daily (before breakfast). Provider, Historical   POTASSIUM CHLORIDE SR 10 MEQ TAB 20 mEq. Provider, Historical   clopidogrel (PLAVIX) 75 mg tablet Take  by mouth daily. Provider, Historical   LORazepam (ATIVAN) 0.5 mg tablet Take 0.25 mg by mouth every eight (8) hours as needed. Provider, Historical   pravastatin (PRAVACHOL) 20 mg tablet Take 20 mg by mouth nightly. Provider, Historical   Ramipril 5 mg Tab Take  by mouth. Provider, Historical   aspirin 81 mg chewable tablet Take 1 Tab by mouth daily. 13   Nishant Danielson MD   carvedilol (COREG) 3.125 mg tablet Take 1 Tab by mouth two (2) times daily (with meals).  13   , Tommy Olivas MD   furosemide (LASIX) 40 mg tablet Take 1 Tab by mouth daily. 4/18/13   , Monty Gaytan MD   levothyroxine (SYNTHROID) 50 mcg tablet Take 1 Tab by mouth Daily (before breakfast). 4/18/13   Monty Danielson MD   potassium chloride SR (KLOR-CON 10) 10 mEq tablet Take 2 Tabs by mouth daily. 4/18/13   Monty Danielson MD   clopidogrel (PLAVIX) 75 mg tablet Take 75 mg by mouth daily. Provider, Historical   LORazepam (ATIVAN) 0.5 mg tablet Take 0.25 mg by mouth three (3) times daily as needed. Provider, Historical   pravastatin (PRAVACHOL) 20 mg tablet Take 20 mg by mouth nightly. Provider, Historical   Ramipril 5 mg Tab Take 5 mg by mouth daily.     Provider, Historical       Past Medical History:   Diagnosis Date    Acute MI ([address]     Anxiety     CAD (coronary artery disease)     Hypertension         Past Surgical History:   Procedure Laterality Date    CARDIAC SURG PROCEDURE UNLIST      HX GYN      hysterectomy    HX ORTHOPAEDIC         Social History     Tobacco Use    Smoking status: Never Smoker    Smokeless tobacco: Not on file   Substance Use Topics    Alcohol use: No        Family History: unable to obtain secondary to altered mentation, poor historian     Review of Systems:  (bold if positive, if negative)    Gen:  Eyes:  ENT:  CVS:  Pulm:  GI:  :  MS:  Skin:  Psych:  Endo:  Hem:  Renal:  Neuro:  weakness        Objective:      VITALS:    Vital signs reviewed; most recent are:    Visit Vitals  BP (!) 120/92   Pulse 83   Temp 97.5 °F (36.4 °C)   Resp 15   Ht 5' 4\" (1.626 m)   Wt 46.3 kg (102 lb)   SpO2 93%   BMI 17.51 kg/m²     SpO2 Readings from Last 6 Encounters:   11/29/21 93%   04/18/13 95%        No intake or output data in the 24 hours ending 11/29/21 1505     Exam:     Physical Exam:    Gen:  Well-developed, well-nourished, in no acute distress  HEENT:  Pink conjunctivae, PERRL, hearing intact to voice, moist mucous membranes  Neck:  Supple, without masses, thyroid non-tender  Resp:  No accessory muscle use, clear breath sounds without wheezes rales or rhonchi  Card:  No murmurs, normal S1, S2 without thrills, bruits or peripheral edema  Abd:  Soft, non-tender, non-distended, normoactive bowel sounds are present, no palpable organomegaly and no detectable hernias  Lymph:  No cervical adenopathy  Musc:  No cyanosis or clubbing  Skin:  No rashes or ulcers, skin turgor is good; R plantar foot discoloration, warm to touch, wiggles toes without issue  Neuro:  dysarthria, moves extremities, follows commands  Psych:  Alert, oriented to self, place, time      Labs:    Recent Labs     11/29/21  1245   WBC 26.2*   HGB 16.8*   HCT 55.2*   *     Recent Labs     11/29/21  1245      K 4.5      CO2 26   *   BUN 53*   CREA 1.91*   CA 9.6   ALB 2.9*   TBILI 1.3*   ALT 27          Assessment/Plan:       1. Altered mental status - CT head without acute abnormality; MRI, MRA pending; asa  2. Dysarthria - r/o ischemic event; see above  3. Lactic acidosis, leukocytosis, thrombocytosis - poss sec to hemoconcentration, IVF trend; CT c/a/p pending  4. Elevated troponin - cardiology c/s, no symptoms but patient poor historian  5. Elevated creatinine - last cr 2013, poss SAMMY vs CKD; gentle IVF, trend  6. Underweight - BMI 17.51, nutrition c/s  7. Cardiomyopathy - EF 30% on echo 4/2013, repeat  8. Chronic combined systolic, diastolic heart failure  9. HTN  10. CAD s/p MI  6. Anxiety  12. VTE prophylaxis - heparin  13. Dispo - admit to remote telemetry, PT/OT for dc planning, palliative care c/s    Unable to ascertain clearly her wishes for code status, unsure if she is understanding conversation fully.     Attending Physician: Adrianne Matias DO

## 2021-11-29 NOTE — ED TRIAGE NOTES
Patient presents to ED via EMS for reported altered mental status. EMS reports patient's elderly friend called rescue because her speech was slurred. Patient reports she has \"spells\" but denied any changes in speech or any weakness on left or right side. Patient alert and oriented x 3 at triage.

## 2021-11-29 NOTE — PROGRESS NOTES
[facility]: MED RECONCILIATION    Comments/Recommendations:    Pharmacist unable to assess medication history with patient due to AMS. Patient's family member Palmer Ying at bedside is a poor historian regarding medications however provides home Rx bottles for levothyroxine, metoprolol, potassium, ramipril.  Pharmacist contacted patient's family member Remington Davis [phone] who serves as caregiver at times however he does not know patient's medications. He does however affirm that patient's only current medications are the four prescription bottles brought to ER and a daily baby aspirin.  Unknown last doses, patient does not think she took her medications today    Information obtained from: Patient's family, RxQuery, Home Rx bottles    Allergies: Patient has no known allergies. Prior to Admission Medications:     Medication Documentation Review Audit       Reviewed by Lark Kussmaul, PHARMD (Pharmacist) on 11/29/21 at 1706      Medication Sig Documenting Provider Last Dose Status Taking?   aspirin delayed-release 81 mg tablet Take 81 mg by mouth daily. Provider, Historical 11/22/2021 Active Yes   levothyroxine (SYNTHROID) 100 mcg tablet Take 100 mcg by mouth Daily (before breakfast). Provider, Historical 11/22/2021 Active Yes   metoprolol succinate (TOPROL-XL) 25 mg XL tablet Take 12.5 mg by mouth daily. Provider, Historical 11/22/2021 Active Yes   POTASSIUM CHLORIDE SR 10 MEQ TAB Take 10 mEq by mouth daily. Provider, Historical 11/22/2021 Active Yes   Ramipril 5 mg Tab Take 5 mg by mouth daily.  Provider, Historical 11/22/2021 Active Yes                  Catrina Daigle, PHARMD   Contact: [phone]

## 2021-11-29 NOTE — ED PROVIDER NOTES
Chief Complaint   Patient presents with    Altered mental status     The patient is very difficult to understand, was also brought in due to altered mental status limiting the history and review of systems. This is a 27-year-old female with a history of hypertension and coronary disease brought in by EMS after someone who came in to check on her noted that she had slurred speech and seemed altered. I was not able to ascertain a last known normal time, as no one had seen the patient yesterday evening and she lives alone. The patient cannot tell me when she started experiencing slurred speech, but does tell me that she felt unwell yesterday. No fevers, headache, chest pain, or difficulty breathing. She tells me that \"sometimes\" she feels weak on one side or the other, but does not have any lateralizing weakness today. No headache, vomiting, chest pain, shortness of breath, or any other systemic complaints.        Past Medical History:   Diagnosis Date    Acute MI ([address]     Anxiety     CAD (coronary artery disease)     Hypertension        Past Surgical History:   Procedure Laterality Date    CARDIAC SURG PROCEDURE UNLIST      HX GYN      hysterectomy    HX ORTHOPAEDIC           Family History:   Problem Relation Age of Onset    Other Unknown         unknown       Social History     Socioeconomic History    Marital status:      Spouse name: Not on file    Number of children: Not on file    Years of education: Not on file    Highest education level: Not on file   Occupational History    Not on file   Tobacco Use    Smoking status: Never Smoker    Smokeless tobacco: Not on file   Substance and Sexual Activity    Alcohol use: No    Drug use: No    Sexual activity: Not on file   Other Topics Concern    Not on file   Social History Narrative    Not on file     Social Determinants of Health     Financial Resource Strain:     Difficulty of Paying Living Expenses: Not on file   Food Insecurity:     Worried About Running Out of Food in the Last Year: Not on file    Helen of Food in the Last Year: Not on file   Transportation Needs:     Lack of Transportation (Medical): Not on file    Lack of Transportation (Non-Medical): Not on file   Physical Activity:     Days of Exercise per Week: Not on file    Minutes of Exercise per Session: Not on file   Stress:     Feeling of Stress : Not on file   Social Connections:     Frequency of Communication with Friends and Family: Not on file    Frequency of Social Gatherings with Friends and Family: Not on file    Attends [employer] Services: Not on file    Active Member of [address] [place] [address] [employer] or Organizations: Not on file    Attends Club or Organization Meetings: Not on file    Marital Status: Not on file   Intimate Partner Violence:     Fear of Current or Ex-Partner: Not on file    Emotionally Abused: Not on file    Physically Abused: Not on file    Sexually Abused: Not on file   Housing Stability:     Unable to Pay for Housing in the Last Year: Not on file    Number of [place] in the Last Year: Not on file    Unstable Housing in the Last Year: Not on file         ALLERGIES: Patient has no known allergies.     Review of Systems   Unable to perform ROS: Mental status change       Vitals:    11/29/21 1245 11/29/21 1305 11/29/21 1308 11/29/21 1400   BP:   126/87 (!) 120/92   Pulse:   81 83   Resp:   20 15   Temp:   97.5 °F (36.4 °C)    SpO2: 95%  95% 93%   Weight:  46.3 kg (102 lb)     Height:  5' 4\" (1.626 m)              Physical Exam  General:  Awake and alert, NAD  HEENT:  NC/AT, equal pupils, moist mucous membranes  Neck:   Normal inspection, full range of motion  Cardiac:  RRR, no murmurs  Respiratory:  Clear bilaterally, no wheezes, rales, rhonchi  Abdomen:  Soft and nontender, nondistended  Back:  No sacral ulcers  Extremities: Warm and pink without peripheral edema, some duskiness across the plantar aspect of the toes of the right foot but still warm to the touch and motor/sensory intact  Neuro:  +Moderate dysarthria, moving all extremities symmetrically without gross motor deficit  Skin:   See above    RESULTS  Recent Results (from the past 12 hour(s))   SAMPLES BEING HELD    Collection Time: 11/29/21 12:45 PM   Result Value Ref Range    SAMPLES BEING HELD 1RED,1SST,1BL     COMMENT        Add-on orders for these samples will be processed based on acceptable specimen integrity and analyte stability, which may vary by analyte. CBC WITH AUTOMATED DIFF    Collection Time: 11/29/21 12:45 PM   Result Value Ref Range    WBC 26.2 (H) 3.6 - 11.0 K/uL    RBC 7.29 (H) 3.80 - 5.20 M/uL    HGB 16.8 (H) 11.5 - 16.0 g/dL    HCT 55.2 (H) 35.0 - 47.0 %    MCV 75.7 (L) 80.0 - 99.0 FL    MCH 23.0 (L) 26.0 - 34.0 PG    MCHC 30.4 30.0 - 36.5 g/dL    RDW 22.3 (H) 11.5 - 14.5 %    PLATELET 297 (H) 789 - 400 K/uL    NRBC 1.0 (H) 0  WBC    ABSOLUTE NRBC 0.27 (H) 0.00 - 0.01 K/uL    NEUTROPHILS 89 (H) 32 - 75 %    LYMPHOCYTES 2 (L) 12 - 49 %    MONOCYTES 7 5 - 13 %    EOSINOPHILS 0 0 - 7 %    BASOPHILS 0 0 - 1 %    IMMATURE GRANULOCYTES 2 (H) 0.0 - 0.5 %    ABS. NEUTROPHILS 23.4 (H) 1.8 - 8.0 K/UL    ABS. LYMPHOCYTES 0.5 (L) 0.8 - 3.5 K/UL    ABS. MONOCYTES 1.8 (H) 0.0 - 1.0 K/UL    ABS. EOSINOPHILS 0.0 0.0 - 0.4 K/UL    ABS. BASOPHILS 0.0 0.0 - 0.1 K/UL    ABS. IMM.  GRANS. 0.5 (H) 0.00 - 0.04 K/UL    DF SMEAR SCANNED      RBC COMMENTS ANISOCYTOSIS  PRESENT       METABOLIC PANEL, COMPREHENSIVE    Collection Time: 11/29/21 12:45 PM   Result Value Ref Range    Sodium 141 136 - 145 mmol/L    Potassium 4.5 3.5 - 5.1 mmol/L    Chloride 104 97 - 108 mmol/L    CO2 26 21 - 32 mmol/L    Anion gap 11 5 - 15 mmol/L    Glucose 153 (H) 65 - 100 mg/dL    BUN 53 (H) 6 - 20 MG/DL    Creatinine 1.91 (H) 0.55 - 1.02 MG/DL    BUN/Creatinine ratio 28 (H) 12 - 20      GFR est AA 30 (L) >60 ml/min/1.73m2    GFR est non-AA 25 (L) >60 ml/min/1.73m2    Calcium 9.6 8.5 - 10.1 MG/DL Bilirubin, total 1.3 (H) 0.2 - 1.0 MG/DL    ALT (SGPT) 27 12 - 78 U/L    AST (SGOT) 40 (H) 15 - 37 U/L    Alk. phosphatase 95 45 - 117 U/L    Protein, total 7.2 6.4 - 8.2 g/dL    Albumin 2.9 (L) 3.5 - 5.0 g/dL    Globulin 4.3 (H) 2.0 - 4.0 g/dL    A-G Ratio 0.7 (L) 1.1 - 2.2     URINALYSIS W/MICROSCOPIC    Collection Time: 11/29/21 12:45 PM   Result Value Ref Range    Color DARK YELLOW      Appearance CLEAR CLEAR      Specific gravity 1.021 1.003 - 1.030      pH (UA) 5.0 5.0 - 8.0      Protein 30 (A) NEG mg/dL    Glucose Negative NEG mg/dL    Ketone TRACE (A) NEG mg/dL    Bilirubin Negative NEG      Blood MODERATE (A) NEG      Urobilinogen 1.0 0.2 - 1.0 EU/dL    Nitrites Negative NEG      Leukocyte Esterase TRACE (A) NEG      WBC 0-4 0 - 4 /hpf    RBC 5-10 0 - 5 /hpf    Epithelial cells FEW FEW /lpf    Bacteria Negative NEG /hpf   URINE CULTURE HOLD SAMPLE    Collection Time: 11/29/21 12:45 PM    Specimen: Serum; Urine   Result Value Ref Range    Urine culture hold        Urine on hold in Microbiology dept for 2 days. If unpreserved urine is submitted, it cannot be used for addtional testing after 24 hours, recollection will be required.    TROPONIN-HIGH SENSITIVITY    Collection Time: 11/29/21 12:45 PM   Result Value Ref Range    Troponin-High Sensitivity 572 (HH) 0 - 51 ng/L   CK    Collection Time: 11/29/21 12:45 PM   Result Value Ref Range     26 - 192 U/L   EKG, 12 LEAD, INITIAL    Collection Time: 11/29/21 12:49 PM   Result Value Ref Range    Ventricular Rate 81 BPM    Atrial Rate 81 BPM    P-R Interval 146 ms    QRS Duration 110 ms    Q-T Interval 386 ms    QTC Calculation (Bezet) 448 ms    Calculated P Axis 80 degrees    Calculated R Axis -114 degrees    Calculated T Axis 44 degrees    Diagnosis       Sinus rhythm with marked sinus arrhythmia  Possible Left atrial enlargement  Right superior axis deviation  Low voltage QRS  Inferior infarct (cited on or before 15-APR-2013)  Cannot rule out Anteroseptal infarct (cited on or before 15-APR-2013)  Abnormal ECG  When compared with ECG of 15-APR-2013 15:28,  Significant changes have occurred          IMAGING  CT HEAD WO CONT    Result Date: 11/29/2021  No acute intracranial hemorrhage, mass or infarct. XR CHEST PORT    Result Date: 11/29/2021  No acute process. Procedures - none unless documented below  EKG as interpreted by me:  Normal sinus rhythm at a rate of 80, right axis deviation, nonspecific intraventricular conduction delay, grossly no ST changes suggesting acute ischemia. ED course: Labs, EKG and imaging reviewed. Brought in by EMS after someone came to her house and called for altered mental status, slurred speech. I did not activate a code stroke as I could not determine when she was last known normal.  CT head unremarkable, she has a significant leukocytosis but no evidence of cystitis or pneumonia, abdomen is nontender, no infectious source on exam.  No sacral ulcers. Ordered Rocephin/vancomycin empirically especially given lactic acidosis, IV fluids ordered. Will admit for continued management, discussed with the hospitalist.    Hospitalist Marilyn Serve for Admission  2:41 PM    ED Room Number:   ER03/03  Patient Name and age:  Deepa Little 80 y.o.  female  Working Diagnosis:     1. Altered mental status, unspecified altered mental status type    2. Leukocytosis, unspecified type    3. Acute renal insufficiency    4. Dehydration    5. Sepsis, due to unspecified organism, unspecified whether acute organ dysfunction present ([facility] [address]      COVID suspicion:   no  Code Status:    Full Code  Readmission:    no  Isolation Requirements:  no  Recommended Level of Care: telemetry  Department:    [address] [place] [address] ED - [phone]  Other:     Cheng Led in by EMS after someone came to her house and called for altered mental status, slurred speech.   I did not activate a code stroke as I could not determine when she was last known normal.  CT head unremarkable, she has a significant leukocytosis but no evidence of cystitis or pneumonia, abdomen is nontender, no infectious source on exam.

## 2021-11-30 PROBLEM — I63.9 ACUTE CVA (CEREBROVASCULAR ACCIDENT) (HCC): Status: ACTIVE | Noted: 2021-01-01

## 2021-11-30 PROBLEM — R63.6 UNDERWEIGHT: Status: ACTIVE | Noted: 2021-01-01

## 2021-11-30 PROBLEM — G93.41 ACUTE METABOLIC ENCEPHALOPATHY: Status: ACTIVE | Noted: 2021-01-01

## 2021-11-30 PROBLEM — D72.829 LEUKOCYTOSIS: Status: ACTIVE | Noted: 2021-01-01

## 2021-11-30 PROBLEM — I25.5 ISCHEMIC CARDIOMYOPATHY: Status: ACTIVE | Noted: 2021-01-01

## 2021-11-30 NOTE — PROGRESS NOTES
Comprehensive Nutrition Assessment    Type and Reason for Visit: Initial, Consult    Nutrition Recommendations/Plan:   1. Continue pureed diet per SLP  2. Provide Magic Cup once daily to increase kcal/protein intake (290 kcal, 38 g carbs, 9 g protein)  3. Provide Ensure Enlive once daily (350 kcal, 44 g carbs, 20 g P) to aid in meeting kcal/protein needs. Nutrition Assessment:    Pt is a 80year old female admitted with Altered mental state [R41.82]  Elevated troponin [R77.8]  Dysarthria [R47.1]. She  has a past medical history of Acute MI ([facility] [address] Anxiety, CAD (coronary artery disease), and Hypertension. RD consulted for poor appetite and wounds. Off floor at time of RD visit. SLP recommended puree diet with thin liquids - patient oriented to person only per notes. Unable to contact NOK. RD to follow up at a later date in attempt to gain further hx. No noted N/V/D at this time. NKFA. Wt Readings from Last 10 Encounters:   11/29/21 46.3 kg (102 lb)   04/18/13 55.3 kg (122 lb)     Patient Vitals for the past 168 hrs:   % Diet Eaten   11/30/21 1047 1 - 25%     Last 3 Recorded Weights in this Encounter    11/29/21 1305   Weight: 46.3 kg (102 lb)       Malnutrition Assessment:  Malnutrition Status:  Insufficient data  - NFPE not completed at this time. Estimated Daily Nutrient Needs:  Energy (kcal): 8728-0955 (25-30); Weight Used for Energy Requirements: Current  Protein (g): 46-55 (1.0-1.2); Weight Used for Protein Requirements: Current  Fluid (ml/day): 6506-9307; Method Used for Fluid Requirements: 1 ml/kcal    Nutrition Related Findings:       ABD:  WNL with active bowel sounds 11/29  Last BM: None noted 111/30  Edema: None noted 11/30    Nutr.  Labs:  Lab Results   Component Value Date/Time    GFR est AA 40 (L) 11/30/2021 10:13 AM    GFR est non-AA 33 (L) 11/30/2021 10:13 AM    Creatinine 1.48 (H) 11/30/2021 10:13 AM    BUN 52 (H) 11/30/2021 10:13 AM    Sodium 143 11/30/2021 10:13 AM    Potassium 4.3 11/30/2021 10:13 AM    Chloride 111 (H) 11/30/2021 10:13 AM    CO2 22 11/30/2021 10:13 AM     Lab Results   Component Value Date/Time    Glucose 100 11/30/2021 10:13 AM     Lab Results   Component Value Date/Time    Hemoglobin A1c 5.7 (H) 11/30/2021 02:23 AM       Nutr. Meds:  Lipitor  Vancomycin    Wounds:    Per wound care note - Stage 1 POA inner right buttocks      Current Nutrition Therapies:  ADULT DIET Dysphagia - Pureed    Anthropometric Measures:  · Height:  5' 4.02\" (162.6 cm)  · Current Body Wt:  46.3 kg (102 lb)   · Ideal Body Wt:  120 lbs:  85 %   Body mass index is 17.5 kg/m². · BMI Category:  Underweight (BMI less than 22) age over 72       Nutrition Diagnosis:   · Underweight related to inadequate protein-energy intake as evidenced by BMI, wounds    Nutrition Interventions:   Food and/or Nutrient Delivery: Continue current diet, Start oral nutrition supplement  Nutrition Education and Counseling: No recommendations at this time  Coordination of Nutrition Care: Continue to monitor while inpatient, Interdisciplinary rounds    Goals:  PO intake >/= 80% estimated protein needs within 2 - 3 days       Nutrition Monitoring and Evaluation:   Behavioral-Environmental Outcomes: None identified  Food/Nutrient Intake Outcomes: Food and nutrient intake, Supplement intake  Physical Signs/Symptoms Outcomes: Biochemical data, Weight    Discharge Planning:     Too soon to determine     Electronically signed by Saad Nathan RD on 54/18/2200  Contact: [phone] or via [employer]

## 2021-11-30 NOTE — PROGRESS NOTES
0730 Bedside and Verbal shift change report given to April RN (oncoming nurse) by Dionisio Mejia RN (offgoing nurse). Report included the following information SBAR and Kardex. This patient was assisted with Intentional Toileting every 2 hours during this shift as appropriate. Documentation of ambulation and output reflected on Flowsheet as appropriate. Purposeful hourly rounding was completed using AIDET and 5Ps. Outcomes of PHR documented as they occurred. Bed alarm in use as appropriate. Dual Suction and ambubag in place. 0900 Dr Rossana Navarrete made aware toes cool and dusky, worse on the right. 65 Dr Jose Alberto Fong on unit notified patient had run of SVT, no complaints of. Ordered to stop IV fluids. [address] Dr Rossana Navarrete notified of lactic 2.7, he doesn't want it rechecked until am. Orders entered. 1930 Bedside and Verbal shift change report given to Dionisio Mejia RN (oncoming nurse) by April RN (offgoing nurse). Report included the following information SBAR and Kardex.

## 2021-11-30 NOTE — PROGRESS NOTES
[address] [address] RX Pharmacy Progress Note: Antimicrobial Stewardship    Consult for antibiotic dosing of Vancomycin by Dr. Devin Warner  Indication: SSTI  Day of Therapy: 2/7    Plan:  Vancomycin therapy:  1. Vancomycin 750 mg given x 1  11/29 @ 1623  2. GFR < 30 Further doses to be determined by level  Goal trough 10-15 mcg/ml  3. Plan:[facility] 1600  Pharmacy to follow daily and will make changes to dose and/or frequency based on clinical status. Other Antimicrobial  Adjusted per protocol   Cefepime   Cultures     11/29 Blood: IP  11/29 Urine: holding   Serum Creatinine     Lab Results   Component Value Date/Time    Creatinine 1.48 (H) 11/30/2021 10:13 AM       Creatinine Clearance Estimated Creatinine Clearance: 17.4 mL/min (A) (based on SCr of 1.48 mg/dL (H)). Procalcitonin  No results found for: PCT     Temp   97.5 °F (36.4 °C)    WBC   Lab Results   Component Value Date/Time    WBC 30.2 (H) 11/30/2021 10:13 AM       For Antifungals, Metronidazole and Nafcillin: Lab Results   Component Value Date/Time    ALT (SGPT) 31 11/30/2021 10:13 AM    AST (SGOT) 56 (H) 11/30/2021 10:13 AM    Alk.  phosphatase 98 11/30/2021 10:13 AM    Bilirubin, total 1.1 (H) 11/30/2021 10:13 AM         Pharmacist: Sussy Timmons

## 2021-11-30 NOTE — PROGRESS NOTES
Problem: Dysphagia (Adult)  Goal: *Acute Goals and Plan of Care (Insert Text)  Description: Speech pathology goals  Initiated 11/30/2021  1. Patient will tolerate puree/thin liquid diet with no overt s/s aspiration within 7 days  2. Patient will tolerate trials of soft solids with timely/complete mastication and full oral clearance within 7 days  Outcome: Progressing Towards Goal     Problem: Motor Speech Impaired (Adult)  Goal: *Acute Goals and Plan of Care (Insert Text)  Description: Speech pathology goals  Initiated 11/30/2021  1. Patient will achieve 80% intelligibility at the phrase level for functional communication given mod cues within 7 days  Outcome: Progressing Towards Goal     [address]  Patient: Linda Meng (57 y.o. 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 Patient initially refused to sit fully upright, so provided initial ice chip in semi-reclined position. Patient with suspected premature spillage and strong, repeated cough with patient pulling herself in more upright position. Patient then agreeable to fully upright position, and she tolerated thin liquids and purees with no additional s/s aspiration. Patient unable to follow commands to trial solids. Patient is at high risk for aspiration secondary to acute CVAs per MRI, advanced age, and mental status. Note palliative has been consulted. Recommend cautious initiation of diet and aspiration precautions as outlined below.      Patient also with moderate dysarthria characterized by imprecise articulation, blended word boundaries, and no insight into deficits as patient reported that her speech is \"all good. \" Intelligibility in conversation is significantly impacted. Patient confused and only oriented to person, and suspect hearing loss as well, so patient unable to benefit from education at this time. Will follow PRN for motor speech pending goals of care decisions and improvement in mental status. Patient will benefit from skilled intervention to address the above impairments. Patients rehabilitation potential is considered to be Fair     PLAN :  Recommendations and Planned Interventions:  -Puree/thin liquid diet with strict aspiration precautions, including fully upright with all PO intake, small/single bites and sips, and supervision with PO intake to monitor for tolerance and ensure patient is sitting upright  -SLP to follow for diet tolerance, as well as PRN for motor speech pending goals of care decisions and improvement in mental status  Frequency/Duration: Patient will be followed by speech-language pathology 3-4 times a week to address swallowing goals, and will follow PRN for motor speech pending goals of care decisions and improvement in mental status  Discharge Recommendations: To Be Determined     SUBJECTIVE:   Patient stated \"It's all good when asked how her speech is, despite poor intelligibility. Oriented to person only.     OBJECTIVE:     Past Medical History:   Diagnosis Date    Acute MI ([address]     Anxiety     CAD (coronary artery disease)     Hypertension      Past Surgical History:   Procedure Laterality Date    CARDIAC SURG PROCEDURE UNLIST      HX GYN      hysterectomy    HX ORTHOPAEDIC       Prior Level of Function/Home Situation:      Diet prior to admission: unknown  Current Diet:  NPO   Cognitive and Communication Status:  Neurologic State: Alert, Confused  Orientation Level: Oriented to person, Disoriented to time, Disoriented to situation, Disoriented to place  Cognition: Decreased command following, Decreased attention/concentration           Swallowing Evaluation:   Oral Assessment:  Oral Assessment  Labial: Right droop  Dentition: Edentulous  Oral Hygiene: dry oral mucosa  Lingual: Other (comment) (did not follow commands to protrude tongue)  Velum: No impairment  Mandible: No impairment  P.O. Trials:  Patient Position: upright in bed  Vocal quality prior to P.O.: No impairment  Consistency Presented: Ice chips; Thin liquid; Puree  How Presented: Self-fed/presented; Cup/sip; Straw; Successive swallows; SLP-fed/presented; Spoon     Bolus Acceptance: No impairment  Bolus Formation/Control: Impaired  Type of Impairment: Delayed; Premature spillage  Propulsion: Delayed (# of seconds)  Oral Residue: None  Initiation of Swallow: Delayed (# of seconds)  Laryngeal Elevation: Functional  Aspiration Signs/Symptoms: Strong cough  Pharyngeal Phase Characteristics: No impairment, issues, or problems   Effective Modifications: Small sips and bites  Cues for Modifications: Minimal       Oral Phase Severity: Moderate  Pharyngeal Phase Severity : Mild-moderate    NOMS:   The NOMS functional outcome measure was used to quantify this patient's level of swallowing impairment. Based on the NOMS, the patient was determined to be at level 3 for swallow function       [facility] Sw[facility]:  Level 1 (CN): NPO  Level 2 (CM): NPO but takes consistency in therapy  Level 3 (CL): Takes less than 50% of nutrition p.o. and continues with nonoral feedings; and/or safe with mod cues; and/or max diet restriction  Level 4 (CK): Safe swallow but needs mod cues; and/or mod diet restriction; and/or still requires some nonoral feeding/supplements  Level 5 (CJ): Safe swallow with min diet restriction; and/or needs min cues  Level 6 (CI): Independent with p.o.; rare cues; usually self cues; may need to avoid some foods or needs extra time  Level 7 ([address] [place] [address]): Independent for all p.o.  CRISTIAN. (2003).  National Outcomes Measurement System (NOMS): Adult Speech-Language Pathology User's Guide. Speech/Language Evaluation  Motor Speech:  Oral-Motor Structure/Motor Speech  Labial: Right droop  Dentition: Edentulous  Oral Hygiene: dry oral mucosa  Lingual: Other (comment) (did not follow commands to protrude tongue)  Velum: No impairment  Mandible: No impairment  Apraxic Characteristics: None  Dysarthric Characteristics: Blended word boundaries; Imprecise  Intelligibility: Impaired  Conversation Intelligibility (%): 50 %  Overall Impairment Severity: Moderate     Voice:      Vocal Quality: No impairment        NOMS:   The NOMS functional outcome measure was used to quantify this patient's level of motor speech impairment. Based on the NOMS, the patient was determined to be at level 4 for motor speech function. NOMS Motor Speech:  Level 1 (CN):  100% unintelligible  Level 2 (CM):  Communication partner responsible for message; can do CV or automatic words w/ max cues but rarely intelligible in context  Level 3 (CL): communication partner primarily responsible for message but says CV/automatic words intelligibly; mod cues to say simple words/phrases  Level 4 (CK): In structured conversation with familiar listener can say simple words and phrases. Mod cues for simple sentences  Level 5 (CJ):  Uses simple sentences for ADLs with familiar and unfamiliar listener; min cues for complex sentences  Level 6 (CI):  Intelligible in ADLs; difficulty in voc/social activites; rare cues for complex message; uses comp strategies  Level 7 ([address] [place] [address]):  Intelligible in all activities. May occasionally use compensatory strategies. CRISTIAN. (2003). National Outcomes Measurement System (NOMS): Adult Speech-Language Pathology User's Guide.        Pain:  Pain Scale 1: Numeric (0 - 10)  Pain Intensity 1: 0       After treatment:   Patient left in no apparent distress in bed, Call bell within reach and Nursing notified    COMMUNICATION/EDUCATION: Patient was too confused to benefit from education. The patient's plan of care including recommendations, planned interventions, and recommended diet changes were discussed with: Registered nurse. Patient is unable to participate in goal setting and plan of care.     Thank you for this referral.  KUSH Martinez  Time Calculation: 21 mins

## 2021-11-30 NOTE — CONSULTS
Gita Vasquez MD, [address] [place] [address]  [facility] [address]  [phone]    Date of  Admission: 11/29/2021 12:27 PM         IMPRESSION and RECOMMENDATIONS     1. Isch CM:  Minimal abnormal troponin, but no overt signs of ischemia. Agree with echo to assess LVF. Had been 30% years ago. Cont ASA, statin. No anticoagulation at this time. Problem List  Date Reviewed: 4/18/2013          Codes Class Noted    Altered mental state ICD-10-CM: R41.82  ICD-9-CM: 780.97  11/29/2021        Elevated troponin ICD-10-CM: R77.8  ICD-9-CM: 790.6  11/29/2021        Dysarthria ICD-10-CM: R47.1  ICD-9-CM: 784.51  11/29/2021        Unspecified hypothyroidism ICD-10-CM: E03.9  ICD-9-CM: 244.9  4/17/2013        Acute systolic heart failure ([facility] [address] ICD-10-CM: I50.21  ICD-9-CM: 428.21  4/15/2013        Unspecified hypertensive heart disease with heart failure(402.91) (Chronic) ICD-10-CM: I11.0  ICD-9-CM: 402.91  4/15/2013        Coronary atherosclerosis of native coronary artery (Chronic) ICD-10-CM: I25.10  ICD-9-CM: 414.01  4/15/2013    Overview Signed 4/16/2013  4:23 PM by Linda ROBERTS 12/26/12- [place]             Anxiety state, unspecified (Chronic) ICD-10-CM: F41.1  ICD-9-CM: 300.00  4/15/2013              History of Present Illness:     Maude Willis is a 80 y.o. female with the above problem list who was admitted for Altered mental state [R41.82]  Elevated troponin [R77.8]  Dysarthria [R47.1]. Ms. Olena Mathur is a 80 y.o. female who is admitted with altered mentation and slurred speech. EMS was called by someone who came to her house to check on her so we do not know her last known normal. History obtained primarily from chart. Patient's only complaint is weakness but she says that has been an ongoing issue. She states \"this happens at times\" but does not elaborate much on what she means despite probing.     She denies chest pain/discomfort, shortness of breath, dyspnea on exertion, orthopnea, paroxysmal noctural dyspnea, lower extremity edema, palpitations, syncope, or near-syncope. Current Facility-Administered Medications   Medication Dose Route Frequency    cefepime (MAXIPIME) 2 g in sterile water (preservative free) 10 mL IV syringe  2 g IntraVENous Q24H    Vancomycin - Pharmacy to dose by level   Other Rx Dosing/Monitoring    Vancomycin Random Level 16:00  1 Each Other ONCE    atorvastatin (LIPITOR) tablet 10 mg  10 mg Oral DAILY    acetaminophen (TYLENOL) tablet 650 mg  650 mg Oral Q4H PRN    Or    acetaminophen (TYLENOL) solution 650 mg  650 mg Per NG tube Q4H PRN    Or    acetaminophen (TYLENOL) suppository 650 mg  650 mg Rectal Q4H PRN    0.9% sodium chloride infusion  75 mL/hr IntraVENous CONTINUOUS    aspirin tablet 325 mg  325 mg Oral DAILY    heparin (porcine) injection 5,000 Units  5,000 Units SubCUTAneous Q8H      No Known Allergies   Family History   Problem Relation Age of Onset    Other Unknown         unknown      Social History     Socioeconomic History    Marital status:      Spouse name: Not on file    Number of children: Not on file    Years of education: Not on file    Highest education level: Not on file   Occupational History    Not on file   Tobacco Use    Smoking status: Never Smoker    Smokeless tobacco: Not on file   Substance and Sexual Activity    Alcohol use: No    Drug use: No    Sexual activity: Not on file   Other Topics Concern    Not on file   Social History Narrative    Not on file     Social Determinants of Health     Financial Resource Strain:     Difficulty of Paying Living Expenses: Not on file   Food Insecurity:     Worried About Running Out of Food in the Last Year: Not on file    Helen of Food in the Last Year: Not on file   Transportation Needs:     Lack of Transportation (Medical): Not on file    Lack of Transportation (Non-Medical):  Not on file   Physical Activity:  Days of Exercise per Week: Not on file    Minutes of Exercise per Session: Not on file   Stress:     Feeling of Stress : Not on file   Social Connections:     Frequency of Communication with Friends and Family: Not on file    Frequency of Social Gatherings with Friends and Family: Not on file    Attends Pentec[employer]al Services: Not on file    Active Member of [address] [place] [address] [email or web address] or Organizations: Not on file    Attends Club or Organization Meetings: Not on file    Marital Status: Not on file   Intimate Partner Violence:     Fear of Current or Ex-Partner: Not on file    Emotionally Abused: Not on file    Physically Abused: Not on file    Sexually Abused: Not on file   Housing Stability:     Unable to Pay for Housing in the Last Year: Not on file    Number of [place] in the Last Year: Not on file    Unstable Housing in the Last Year: Not on file       Physical Exam:     Patient Vitals for the past 16 hrs:   BP Temp Pulse Resp SpO2   11/30/21 1117 (!) 147/91 97.5 °F (36.4 °C) 93 18 94 %   11/30/21 0623 128/79 97.4 °F (36.3 °C) 73 17 97 %   11/30/21 0243 139/83 97.6 °F (36.4 °C) 79 16 96 %   11/29/21 2324 138/85 97.2 °F (36.2 °C) 76 20 95 %   11/29/21 2300   78         HEENT Exam:     Normocephalic, atraumatic. EOMI. Oropharynx negative. Neck supple. No lymphadenopathy. Lung Exam:     The patient is not dyspneic. There is no cough. Breath sounds are heard equally in all lung fields. There are no wheezes, rales, rhonchi, or rubs heard on auscultation. Heart Exam:     The rhythm is regular. The PMI is in the 5th intercostal space of the MCL. Apical impulse is normal. S1 is regular. S2 is physiologic. There is no S3, S4 gallop, murmur, click, or rub. Abdomen Exam:     Bowel sounds are normoactive. Abdomen soft in all quadrants. No tenderness. No palpable masses. No organomegaly. No hernias noted. No bruits or pulsatile mass.          Extremities Exam:     The extremities are atraumatic appearing. There is no clubbing, cyanosis, edema, ulcers, varicose veins, rash, erythemia noted in the extremities. The neurovascular status is grossly intact with normal distal sensation and pulses. Vascular Exam:     The radial, brachial, dorsalis pedis, posterior tibial, are equal and strong bilaterally The carotids are equal bilaterally without bruits. Labs:     Lab Results   Component Value Date/Time    Glucose 100 11/30/2021 10:13 AM    Sodium 143 11/30/2021 10:13 AM    Potassium 4.3 11/30/2021 10:13 AM    Chloride 111 (H) 11/30/2021 10:13 AM    CO2 22 11/30/2021 10:13 AM    BUN 52 (H) 11/30/2021 10:13 AM    Creatinine 1.48 (H) 11/30/2021 10:13 AM    Calcium 9.3 11/30/2021 10:13 AM     Recent Labs     11/30/21  1013 11/29/21  1245   WBC 30.2* 26.2*   HGB 16.9* 16.8*   HCT 55.6* 55.2*    432*     Recent Labs     11/30/21  1013 11/29/21  1245   ALT 31 27   AP 98 95   TBILI 1.1* 1.3*   TP 7.0 7.2   ALB 2.6* 2.9*   GLOB 4.4* 4.3*     No results for input(s): INR, PTP, APTT, INREXT in the last 72 hours. Recent Labs     11/29/21  1245        No results for input(s): TROIQ in the last 72 hours. Lab Results   Component Value Date/Time    Cholesterol, total 164 11/30/2021 02:23 AM    HDL Cholesterol 31 11/30/2021 02:23 AM    LDL, calculated 100 11/30/2021 02:23 AM    Triglyceride 165 (H) 11/30/2021 02:23 AM    CHOL/HDL Ratio 5.3 (H) 11/30/2021 02:23 AM       EKG:  NSR @ 80, PVC, PAF, old IMI, ?  LVH, NSSTTW abn

## 2021-11-30 NOTE — PROGRESS NOTES
Stroke Education provided to patient left at bedside and the following topics were discussed    1. Patients personal risk factors for stroke are hypertension and Other CAD    2. Warning signs of Stroke:        * Sudden numbness or weakness of the face, arm or leg, especially on one side of          The body            * Sudden confusion, trouble speaking or understanding        * Sudden trouble seeing in one or both eyes        * Sudden trouble walking, dizziness, loss of balance or coordination        * Sudden severe headache with no known cause      3. Importance of activation Emergency Medical Services ( 9-1-1 ) immediately if experience any warning signs of stroke. 4. Be sure and schedule a follow-up appointment with your primary care doctor or any specialists as instructed. 5. You must take medicine every day to treat your risk factors for stroke. Be sure to take your medicines exactly as your doctor tells you: no more, no less. Know what your medicines are for , what they do. Anti-thrombotics /anticoagulants can help prevent strokes. You are taking the following medicine(s)  ASA, Plavix, Coreg, Pravachol    Smoking and second-hand smoke greatly increase your risk of stroke, cardiovascular disease and death. Smoking history never    7. Information provided was Stroke Handouts    8. Documentation of teaching completed in Patient Education Activity and on Care Plan with teaching response noted?   yes

## 2021-11-30 NOTE — PROGRESS NOTES
Spiritual Care Assessment/Progress Note  [address]      NAME: Tiffanie uLgo      MRN: [id number]  AGE: 80 y.o.  SEX: female  Scientologist Affiliation: Adventism   Language: English     11/30/2021     Total Time (in minutes): 79     Spiritual Assessment begun in [facility] [address] through conversation with:         [x]Patient        [x] Family    [] Friend(s)        Reason for Consult: Palliative Care, Initial/Spiritual Assessment     Spiritual beliefs: (Please include comment if needed)     [x] Identifies with a bettina tradition: Latter-day        [] Supported by a bettina community:            [] Claims no spiritual orientation:           [] Seeking spiritual identity:                [] Adheres to an individual form of spirituality:           [] Not able to assess:                           Identified resources for coping:      [] Prayer                               [] Music                  [] Guided Imagery     [x] Family/friends                 [] Pet visits     [] Devotional reading                         [] Unknown     [] Other:                                              Interventions offered during this visit: (See comments for more details)    Patient Interventions: Advance medical directive completed, Affirmation of emotions/emotional suffering, Affirmation of bettina     Family/Friend(s): Advance medical directive consult     Plan of Care:     [] Support spiritual and/or cultural needs    [] Support AMD and/or advance care planning process      [] Support grieving process   [] Coordinate Rites and/or Rituals    [] Coordination with community clergy   [] No spiritual needs identified at this time   [] Detailed Plan of Care below (See Comments)  [] Make referral to Music Therapy  [] Make referral to Pet Therapy     [] Make referral to Addiction services  [] Make referral to [facility]  [] Make referral to Spiritual Care Partner  [] No future visits requested        [x] Follow up visits as needed     Visited pt after being notified that her family was at bedside and the pt was interested in completing an AMD.  Engaged pt in an AMD discussion as she had no such document. Her closest relatives are siblings. Pt choose to complete an AMD. She names her brother Cami Payne ([phone]) as mPOA and her nephew Ariana Singh ([phone]) as secondary. Pt was living alone and driving every day, for shopping and such, until this hospitalization. She and her family acknowledge that this may not be possible going forward.    Sheryle Stapler, Chaplain, MDiv, MS, [address]

## 2021-11-30 NOTE — PROGRESS NOTES
Roderick Adkins [facility] 79  [address] [facility], [address] [place] [address]  [phone]      Medical Progress Note      NAME:         Jay Alan   :        1928  MRM:        [id number]    Date of service: 2021      Subjective: Patient has been seen and examined as a follow up for multiple medical issues. Chart, labs, diagnostics reviewed. She remains confused, appears uncomfortable. Discussed with her nurse and patient has not been afebrile, no nausea or vomiting    Objective:    Vital Signs:    Visit Vitals  BP (!) 147/91 (BP 1 Location: Left upper arm, BP Patient Position: At rest)   Pulse 93   Temp 97.5 °F (36.4 °C)   Resp 18   Ht 5' 4.02\" (1.626 m)   Wt 46.3 kg (102 lb)   SpO2 94%   BMI 17.50 kg/m²          Intake/Output Summary (Last 24 hours) at 2021 1405  Last data filed at 2021 1047  Gross per 24 hour   Intake 200 ml   Output 0 ml   Net 200 ml        Physical Examination:    General:   Weak, cachectic and frail looking, uncomfortable but not in distress   Eyes:   pink conjunctivae, PERRLA with no discharge. ENT:   no ottorrhea or rhinorrhea with dry mucous membranes  Neck: no masses, thyroid non-tender and trachea central.  Pulm:  no accessory muscle use, decreased but clear breath sounds without crackles or wheezes  Card:  no JVD or murmurs, has regular and normal S1, S2 without thrills, bruits or peripheral edema  Abd:  Soft, non-tender, non-distended, normoactive bowel sounds with no palpable organomegaly  Musc:  No cyanosis, clubbing, atrophy or deformities. Skin:  No rashes, bruising. Redness over sacral bony prominence. No overt ulcers. Neuro: Awake and alert but confused.  Generally a non focal exam. Limited exam.   Psych:  Has little insight to her illness at this time     Current Facility-Administered Medications   Medication Dose Route Frequency    cefepime (MAXIPIME) 2 g in sterile water (preservative free) 10 mL IV syringe  2 g IntraVENous Q24H    Vancomycin - Pharmacy to dose by level   Other Rx Dosing/Monitoring    Vancomycin Random Level 16:00  1 Each Other ONCE    atorvastatin (LIPITOR) tablet 10 mg  10 mg Oral DAILY    acetaminophen (TYLENOL) tablet 650 mg  650 mg Oral Q4H PRN    Or    acetaminophen (TYLENOL) solution 650 mg  650 mg Per NG tube Q4H PRN    Or    acetaminophen (TYLENOL) suppository 650 mg  650 mg Rectal Q4H PRN    0.9% sodium chloride infusion  75 mL/hr IntraVENous CONTINUOUS    aspirin tablet 325 mg  325 mg Oral DAILY    heparin (porcine) injection 5,000 Units  5,000 Units SubCUTAneous Q8H        Laboratory data and review:    Recent Labs     11/30/21  1013 11/29/21  1245   WBC 30.2* 26.2*   HGB 16.9* 16.8*   HCT 55.6* 55.2*    432*     Recent Labs     11/30/21  1013 11/29/21  1245    141   K 4.3 4.5   * 104   CO2 22 26    153*   BUN 52* 53*   CREA 1.48* 1.91*   CA 9.3 9.6   ALB 2.6* 2.9*   ALT 31 27     No components found for: [place]    Diagnostics: Imaging studies have been reviewed    Telemetry reviewed by me:   normal sinus rhythm    Assessment and Plan:    Acute CVA (cerebrovascular accident) ([facility] [address] (11/30/2021) / Dysarthria (11/29/2021) POA: MRi brain showed several small foci of acute infarction in the cerebellar vermis, left occipital lobe, and left parietal lobe. Doppler carotids pending. , A1c 5.7. Echo pending. Seen by neurology. Continue Asprin, Lipitor, PT, OT and speech therapies. CM for discharge planning. Given her advanced age, she is at risk for worsening. Leukocytosis (11/30/2021) POA: unclear as to why she has a now worsening leukocytosis. She has no fever. CT scans chest, abdomen and pelvis unremarkable for any obvious focus of infection. Blood cultures neg. Peripheral smear unremarkable for blasts. Although this maybe reactive, there is concern for an occult infection.  Lactate acid trending down (this may also have been from volume depletion). Continue IV fluids, start empiric IV Cefepime and Vancomycin for now and monitor clinical progress    Coronary atherosclerosis of native coronary artery / Elevated troponin (11/29/2021) /  Ischemic cardiomyopathy (11/30/2021) POA: Hx CAD MI 12/26/12- [place]. Now with a mild troponin elevation. No overt ischemia. Seen by cardiology. Continue Asprin, Lipitor. Follow Echocardiogram    Hypothyroidism (4/17/2013) POA: TSH is normal. Unclear if she is taking any medications. Monitor for now     Acute metabolic encephalopathy (86/21/6599) POA: likely due to the CVA vs suspected occult infection. Monitor clinical progress    Underweight (11/30/2021) POA: due to advanced age, likely poor intake.  Diet as tolerated when able    Total time spent for the patient's care: [address] discussed with: Patient, Care Manager and Nursing Staff    Discussed:  Care Plan and D/C Planning    Prophylaxis:  Hep SQ    Anticipated Disposition:  SNF/LTC           ___________________________________________________    Attending Physician:   Radha Mcfadden MD

## 2021-11-30 NOTE — CONSULTS
STROKE/TRANSIENT ISCHEMIC ATTACK CONSULT NOTE    Patient ID:  Joanie Seip  [id number]  57 y.o.  7/7/1928    Date of Consultation:  November 30, 2021    Referring Physician: Dr. Malaika Santizo    Reason for Consultation:  Altered mental status    History of Present Illness:     Patient Active Problem List    Diagnosis Date Noted    Altered mental state 11/29/2021    Elevated troponin 11/29/2021    Dysarthria 11/29/2021    Unspecified hypothyroidism 64/87/0408    Acute systolic heart failure ([address] 04/15/2013    Unspecified hypertensive heart disease with heart failure(402.91) 04/15/2013    Coronary atherosclerosis of native coronary artery 04/15/2013    Anxiety state, unspecified 04/15/2013     Past Medical History:   Diagnosis Date    Acute MI ([facility] [address]     Anxiety     CAD (coronary artery disease)     Hypertension       Past Surgical History:   Procedure Laterality Date    CARDIAC SURG PROCEDURE UNLIST      HX GYN      hysterectomy    HX ORTHOPAEDIC        Prior to Admission medications    Medication Sig Start Date End Date Taking? Authorizing Provider   metoprolol succinate (TOPROL-XL) 25 mg XL tablet Take 12.5 mg by mouth daily. Yes Provider, Historical   levothyroxine (SYNTHROID) 100 mcg tablet Take 100 mcg by mouth Daily (before breakfast). Yes Provider, Historical   aspirin delayed-release 81 mg tablet Take 81 mg by mouth daily. Yes Provider, Historical   POTASSIUM CHLORIDE SR 10 MEQ TAB Take 10 mEq by mouth daily. Yes Provider, Historical   Ramipril 5 mg Tab Take 5 mg by mouth daily.    Yes Provider, Historical     No Known Allergies   Social History     Tobacco Use    Smoking status: Never Smoker    Smokeless tobacco: Not on file   Substance Use Topics    Alcohol use: No      Family History   Problem Relation Age of Onset    Other Unknown         unknown        Subjective:      Joanie Seip is a 80 y.o. female with history hypertension and coronary artery disease was brought to the emergency room for slurred speech and altered mental status. Patient apparently lives alone and was found at home to have slurred speech and altered mental status. Last seen well evening prior to admission. When asked patient just feels unwell. Sometimes she just feels weak all over. EMS was called and patient was brought to the ER. In the ER blood pressure was 126/87. Labs done revealed increased blood sugar, increased creatinine, decreased GFR, increased bilirubin, increased AST and decreased albumin. Increased lactic acid at 4.6, increased troponin at 572, increased WBC at 26.2, increased hemoglobin at 16.8, increased platelet at 657. Unremarkable CPK and urinalysis. Chest x-ray did not reveal any acute process. Head CT did not reveal any acute process. Age-appropriate diffuse cortical atrophy. EKG revealed sinus rhythm with marked sinus arrhythmia. Chest CT without contrast and CT of the abdomen and pelvis revealed mild pulmonary edema, small pleural effusions, small ascites and anasarca. Cardiomegaly and small pericardial effusion. Gas-filled distended cecum without wall thickening or obstruction. Brain MRI without contrast revealed several small foci of acute infarction in the cerebellar vermis, left occipital lobe and left parietal lobe. Patchy chronic white matter disease. Small chronic infarctions left cerebellum. Spoke to patient's brother checks on her. Patient was previously active and driving and socializing until about 1 week prior to admission. Started to not be as active. Then day before admission, rescue squad was actually called this patient was not as responsive and interactive. They did a routine check and felt it was not necessary for her to be sent to the hospital.  Started to feed her and hydrate her. Persistence led to them calling EMS again and then patient was eventually sent to the ER. When seen, patient is dysarthric and sometimes talks nonsensically. Intermittently follows commands. Outside reports reviewed: ER records, radiology reports, lab reports. Review of Systems:    Pertinent items are noted in HPI. Objective:     Patient Vitals for the past 8 hrs:   BP Temp Pulse Resp SpO2   11/30/21 0623 128/79 97.4 °F (36.3 °C) 73 17 97 %   11/30/21 0243 139/83 97.6 °F (36.4 °C) 79 16 96 %     PHYSICAL EXAM:    NEUROLOGICAL EXAM:    Appearance: The patient is thin and is in no acute distress. Mental Status: Oriented to person. Fluent with no aphasia but dysarthric. Intermittently follows commands. Talks nonsensically. Cranial Nerves:   Intact visual fields. BELKIS, EOM's full, no nystagmus, no ptosis. Facial sensation is normal. Corneal reflexes are intact. Right facial droop. Hearing is normal bilaterally. Palate is midline with normal elevation. Sternocleidomastoid and trapezius muscles are normal. Tongue is midline. Motor:  Generalized weakness but LE>>UE. No focal weakness. Normal tone. No pronator drift. Reflexes:   Deep tendon reflexes 2+/4 and symmetrical. Downgoing toes. Sensory:   Normal to cold and noxious. Gait:  Not tested. Tremor:   No tremor noted. Cerebellar:  Unable to do. Imaging  CT Head, brain MRI: reviewed    Lab Review    Recent Results (from the past 24 hour(s))   SAMPLES BEING HELD    Collection Time: 11/29/21 12:45 PM   Result Value Ref Range    SAMPLES BEING HELD 1RED,1SST,1BL     COMMENT        Add-on orders for these samples will be processed based on acceptable specimen integrity and analyte stability, which may vary by analyte.    CBC WITH AUTOMATED DIFF    Collection Time: 11/29/21 12:45 PM   Result Value Ref Range    WBC 26.2 (H) 3.6 - 11.0 K/uL    RBC 7.29 (H) 3.80 - 5.20 M/uL    HGB 16.8 (H) 11.5 - 16.0 g/dL    HCT 55.2 (H) 35.0 - 47.0 %    MCV 75.7 (L) 80.0 - 99.0 FL    MCH 23.0 (L) 26.0 - 34.0 PG    MCHC 30.4 30.0 - 36.5 g/dL    RDW 22.3 (H) 11.5 - 14.5 %    PLATELET 469 (H) 481 - 400 K/uL    NRBC 1.0 (H) 0  WBC    ABSOLUTE NRBC 0.27 (H) 0.00 - 0.01 K/uL    NEUTROPHILS 89 (H) 32 - 75 %    LYMPHOCYTES 2 (L) 12 - 49 %    MONOCYTES 7 5 - 13 %    EOSINOPHILS 0 0 - 7 %    BASOPHILS 0 0 - 1 %    IMMATURE GRANULOCYTES 2 (H) 0.0 - 0.5 %    ABS. NEUTROPHILS 23.4 (H) 1.8 - 8.0 K/UL    ABS. LYMPHOCYTES 0.5 (L) 0.8 - 3.5 K/UL    ABS. MONOCYTES 1.8 (H) 0.0 - 1.0 K/UL    ABS. EOSINOPHILS 0.0 0.0 - 0.4 K/UL    ABS. BASOPHILS 0.0 0.0 - 0.1 K/UL    ABS. IMM. GRANS. 0.5 (H) 0.00 - 0.04 K/UL    DF SMEAR SCANNED      RBC COMMENTS ANISOCYTOSIS  PRESENT       METABOLIC PANEL, COMPREHENSIVE    Collection Time: 11/29/21 12:45 PM   Result Value Ref Range    Sodium 141 136 - 145 mmol/L    Potassium 4.5 3.5 - 5.1 mmol/L    Chloride 104 97 - 108 mmol/L    CO2 26 21 - 32 mmol/L    Anion gap 11 5 - 15 mmol/L    Glucose 153 (H) 65 - 100 mg/dL    BUN 53 (H) 6 - 20 MG/DL    Creatinine 1.91 (H) 0.55 - 1.02 MG/DL    BUN/Creatinine ratio 28 (H) 12 - 20      GFR est AA 30 (L) >60 ml/min/1.73m2    GFR est non-AA 25 (L) >60 ml/min/1.73m2    Calcium 9.6 8.5 - 10.1 MG/DL    Bilirubin, total 1.3 (H) 0.2 - 1.0 MG/DL    ALT (SGPT) 27 12 - 78 U/L    AST (SGOT) 40 (H) 15 - 37 U/L    Alk.  phosphatase 95 45 - 117 U/L    Protein, total 7.2 6.4 - 8.2 g/dL    Albumin 2.9 (L) 3.5 - 5.0 g/dL    Globulin 4.3 (H) 2.0 - 4.0 g/dL    A-G Ratio 0.7 (L) 1.1 - 2.2     URINALYSIS W/MICROSCOPIC    Collection Time: 11/29/21 12:45 PM   Result Value Ref Range    Color DARK YELLOW      Appearance CLEAR CLEAR      Specific gravity 1.021 1.003 - 1.030      pH (UA) 5.0 5.0 - 8.0      Protein 30 (A) NEG mg/dL    Glucose Negative NEG mg/dL    Ketone TRACE (A) NEG mg/dL    Bilirubin Negative NEG      Blood MODERATE (A) NEG      Urobilinogen 1.0 0.2 - 1.0 EU/dL    Nitrites Negative NEG      Leukocyte Esterase TRACE (A) NEG      WBC 0-4 0 - 4 /hpf    RBC 5-10 0 - 5 /hpf    Epithelial cells FEW FEW /lpf    Bacteria Negative NEG /hpf   URINE CULTURE HOLD SAMPLE    Collection Time: 11/29/21 12:45 PM    Specimen: Serum; Urine   Result Value Ref Range    Urine culture hold        Urine on hold in Microbiology dept for 2 days. If unpreserved urine is submitted, it cannot be used for addtional testing after 24 hours, recollection will be required. TROPONIN-HIGH SENSITIVITY    Collection Time: 11/29/21 12:45 PM   Result Value Ref Range    Troponin-High Sensitivity 572 (HH) 0 - 51 ng/L   CK    Collection Time: 11/29/21 12:45 PM   Result Value Ref Range     26 - 192 U/L   EKG, 12 LEAD, INITIAL    Collection Time: 11/29/21 12:49 PM   Result Value Ref Range    Ventricular Rate 81 BPM    Atrial Rate 81 BPM    P-R Interval 146 ms    QRS Duration 110 ms    Q-T Interval 386 ms    QTC Calculation (Bezet) 448 ms    Calculated P Axis 80 degrees    Calculated R Axis -114 degrees    Calculated T Axis 44 degrees    Diagnosis       Sinus rhythm with marked sinus arrhythmia  Possible Left atrial enlargement  Right superior axis deviation  Low voltage QRS  Inferior infarct (cited on or before 15-APR-2013)  Cannot rule out Anteroseptal infarct (cited on or before 15-APR-2013)  Abnormal ECG  When compared with ECG of 15-APR-2013 15:28,  Significant changes have occurred     CULTURE, BLOOD, PAIRED    Collection Time: 11/29/21  1:55 PM    Specimen: Blood   Result Value Ref Range    Special Requests: NO SPECIAL REQUESTS      Culture result: NO GROWTH AFTER 14 HOURS     LACTIC ACID    Collection Time: 11/29/21  2:15 PM   Result Value Ref Range    Lactic acid 4.6 (HH) 0.4 - 2.0 MMOL/L   LACTIC ACID    Collection Time: 11/29/21  4:16 PM   Result Value Ref Range    Lactic acid 3.4 (HH) 0.4 - 2.0 MMOL/L         Assessment:   Acute CVA  Acute metabolic encephalopathy  Hyperlipidemia    Plan:   Neurological examination reveals cognitive impairment, dysarthria, right facial droop, generalized weakness lower extremity greater than upper extremity.   Status post punctate acute infarction in cerebellar vermis, left occipital lobe and left parietal lobe. Also elements of encephalopathy. Head CT without contrast did not reveal any acute process. Brain MRI without contrast revealed several small foci of acute infarction in the cerebellar vermis, left occipital lobe and left parietal lobe. Patchy chronic white matter disease. Small chronic infarctions left cerebellum. Carotid Doppler study was ordered. Echocardiogram is pending. EEG was ordered to assess for seizures. LDL was elevated at 100. It should be less than 70. Atorvastatin 10 mg daily was ordered. Continue aspirin 325 mg daily. Hemoglobin A1c was slightly elevated at 5.7. Dietary changes. Correct underlying medical issues. Evaluations by speech, PT and OT. Patient likely either an inpatient or skilled nursing facility candidate. Spoke with patient's brother that likely patient is not going to be safe to be back home alone. Will need supervision. Thank you for the consult. This note was created using voice recognition software. Despite editing, there may be syntax errors.

## 2021-11-30 NOTE — PROGRESS NOTES
Patient arrived on the unit via stretcher with transport. Placed on telemetry monitor, chg bath completed and assessment completed. Patient AOX1 on room air. IV access had to be wrapped by the nurse and activity vest given to the patient to occupy the time. Neuro checks done q4. Patient turned q2h. Failed bedside swallow. NPO continues. Patient remains on IV fluids per orders.  Will continue to monitor and follow the plan of care

## 2021-11-30 NOTE — PROGRESS NOTES
Problem: Mobility Impaired (Adult and Pediatric)  Goal: *Acute Goals and Plan of Care (Insert Text)  Description: FUNCTIONAL STATUS PRIOR TO ADMISSION: Patient was independent and active without use of DME.    HOME SUPPORT PRIOR TO ADMISSION: The patient lived alone with brother to provide assistance. Physical Therapy Goals  Initiated 11/30/2021  1. Patient will move from supine to sit and sit to supine , scoot up and down, and roll side to side in bed with minimal assistance/contact guard assist within 7 day(s). 2.  Patient will transfer from bed to chair and chair to bed with minimal assistance/contact guard assist using the least restrictive device within 7 day(s). 3.  Patient will perform sit to stand with minimal assistance/contact guard assist within 7 day(s). 4.  Patient will ambulate with minimal assistance/contact guard assist for 100 feet with the least restrictive device within 7 day(s). Outcome: Progressing Towards Goal   PHYSICAL THERAPY EVALUATION- NEURO POPULATION  Patient: Chalino Leung (6 y.o. female)  Date: 11/30/2021  Primary Diagnosis: Altered mental state [R41.82]  Elevated troponin [R77.8]  Dysarthria [R47.1]        Precautions: fall         ASSESSMENT  Based on the objective data described below, the patient presents with difficulty with ambulation. Communicated with nurse who was in the room with the patient cleared for therapy. Patient supine on bed when received, patient oriented to person only on and off confusion. Rolled on the edge of bed max assist x 2, supine to sit max assist x 2, sit to stand max assist x 2, tolerated sitting with some support. Ambulate lateral side steps towards the head of bed max assist x 2 noted patient very fearful of falling. Assisted back to bed supine and place bed to modified chair position. Activated bed alarm and notified nurse who is still in the room and agreed to monitor patient.     Current Level of Function Impacting Discharge (mobility/balance): max assist x 2 with bed mobility and transfers    Functional Outcome Measure: The patient scored Total: 0/56 on the Dia Balance Assessment which is indicative of high fall risk. Other factors to consider for discharge: fall, lives alone     Patient will benefit from skilled therapy intervention to address the above noted impairments. PLAN :  Recommendations and Planned Interventions: bed mobility training, transfer training, gait training, therapeutic exercises, neuromuscular re-education, orthotic/prosthetic training, patient and family training/education, and therapeutic activities      Frequency/Duration: Patient will be followed by physical therapy:  daily to address goals. Recommendation for discharge: (in order for the patient to meet his/her long term goals)  Therapy up to 5 days/week in rehab setting    This discharge recommendation:  Has been made in collaboration with the attending provider and/or case management    IF patient discharges home will need the following DME: to be determined (TBD)         SUBJECTIVE:   Patient stated ok.     OBJECTIVE DATA SUMMARY:   HISTORY:    Past Medical History:   Diagnosis Date    Acute MI ([address]     Anxiety     CAD (coronary artery disease)     Hypertension      Past Surgical History:   Procedure Laterality Date    CARDIAC SURG PROCEDURE UNLIST      HX GYN      hysterectomy    HX ORTHOPAEDIC         Personal factors and/or comorbidities impacting plan of care:     Home Situation  Home Environment: Private residence  Living Alone: Yes  Support Systems: Other Family Member(s)  Patient Expects to be Discharged to[de-identified] Unknown  Current DME Used/Available at Home: None  Tub or Shower Type: Shower    EXAMINATION/PRESENTATION/DECISION MAKING:   Critical Behavior:  Neurologic State: Alert  Orientation Level: Oriented X4  Cognition: Appropriate decision making, Decreased attention/concentration, Impaired decision making, Poor safety awareness  Safety/Judgement: Decreased awareness of environment, Lack of insight into deficits  Hearing:    Range Of Motion:  AROM: Within functional limits           PROM: Within functional limits           Strength:    Strength: Generally decreased, functional                    Tone & Sensation:                                  Coordination:  Coordination: Generally decreased, functional  Vision:   Tracking: Unable to hold eye position out of midline (more difficulty noted to B lower quad )  Diplopia: No (patient denies diplopia when asked multiple times)  Functional Mobility:  Bed Mobility:  Rolling: Assist x2; Additional time; Moderate assistance  Supine to Sit: Maximum assistance; Assist x2; Additional time  Sit to Supine: Maximum assistance; Assist x2; Additional time  Scooting: Maximum assistance; Assist x2; Additional time  Transfers:  Sit to Stand: Maximum assistance; Assist x2; Additional time  Stand to Sit: Maximum assistance; Assist x2; Additional time                       Balance:   Sitting: Impaired; With support  Sitting - Static: Poor (constant support)  Sitting - Dynamic: Poor (constant support)  Standing: Impaired; Pull to stand; With support  Standing - Static: Poor  Standing - Dynamic : Poor  Ambulation/Gait Training:  Distance (ft): 2 Feet (ft) (lateral side steps)  Assistive Device: Gait belt (hand held assist)  Ambulation - Level of Assistance: Maximum assistance; Assist x2     Gait Description (WDL): Exceptions to WDL  Gait Abnormalities: Path deviations; Step to gait        Base of Support: Narrowed     Speed/Melia: Fluctuations; Slow  Step Length: Right shortened; Left shortened                      Therapeutic Exercises:   Educate and instructed patient to continue active range of motion exercise on both legs while up on bed multiple times.       Functional Measure  Dia Balance Test:    Sitting to Standin  Standing Unsupported: 0  Sitting with Back Unsupported: 0  Standing to Sittin  Transfers: 0  Standing Unsupported with Eyes Closed: 0  Standing Unsupported with Feet Together: 0  Reach Forward with Outstretched Arm: 0   Object: 0  Turn to Look Over Shoulders: 0  Turn 360 Degrees: 0  Alternate Foot on Step/Stool: 0  Standing Unsupported One Foot in Front: 0  Stand on One Le  Total: 0/56         56=Maximum possible score;   0-20=High fall risk  21-40=Moderate fall risk   41-56=Low fall risk        Physical Therapy Evaluation Charge Determination   History Examination Presentation Decision-Making   MEDIUM  Complexity : 1-2 comorbidities / personal factors will impact the outcome/ POC  MEDIUM Complexity : 3 Standardized tests and measures addressing body structure, function, activity limitation and / or participation in recreation  MEDIUM Complexity : Evolving with changing characteristics  Other outcome measures wilson balance test  MEDIUM      Based on the above components, the patient evaluation is determined to be of the following complexity level: HIGH     Pain Ratin/10    Activity Tolerance:   Good    After treatment patient left in no apparent distress:   Supine in bed, Heels elevated for pressure relief, Call bell within reach, Bed / chair alarm activated, and Side rails x 3    COMMUNICATION/EDUCATION:   The patients plan of care was discussed with: Occupational therapist and Registered nurse. Patient was educated regarding her deficit(s) of slurred speech as this relates to her diagnosis of TIA. She demonstrated fair understanding as evidenced by recall. Patient and/or family was verbally educated on the BE FAST acronym for signs/symptoms of CVA and TIA. Informed patient to refer to the Stroke Binder for further BE FAST information. All questions answered with patient indicating fair understanding. Fall prevention education was provided and the patient/caregiver indicated understanding.     Thank you for this referral.  Chris Kate, PT,WCC.    Time Calculation: 28 mins

## 2021-11-30 NOTE — CARDIO/PULMONARY
[facility] Cardiopulmonary Rehab:  Chart Review completed from troponin elevation hospital report. Following history of present illness & current care regimen. Pt not a candidate for outpatient cardiac rehab due to physical limitations.

## 2021-11-30 NOTE — PROCEDURES
[facility] [address]     Electroencephalogram Report    Procedure ID: SFA  Procedure Date: 11/30/2021   Patient Name: Nandini Yuan YOB: 1928   Procedure Type: Routine Medical Record No: [id number]     INDICATION: Altered mental status    STATE: Awake and drowsy . DESCRIPTION OF PROCEDURE: Electrodes were applied in accordance with the international 10-20 system of electrode placement. EEG was reviewed in both bipolar and referential montages. Description of Activity:  During wakefulness, patient is only able to mount 5-6 hertz posterior frequency that spread anteriorly with occasional delta background slowing. During drowsiness, there is attenuation of the underlying frequency and slower theta activity occurs bilaterally. Intermittent photic stimulation was performed and did not induce posterior driving responses. No sharp or spike discharges, seizures or epileptiform discharges seen. No focal asymmetry. Clinical Interpretation: This EEG, performed during wakefulness and drowsiness is abnormal. There is moderate generalized slowing as seen in encephalopathies. There is no focal asymmetry, seizures or epileptiform discharges seen.        Medications:  Current Facility-Administered Medications   Medication Dose Route Frequency    cefepime (MAXIPIME) 2 g in sterile water (preservative free) 10 mL IV syringe  2 g IntraVENous Q24H    Vancomycin - Pharmacy to dose by level   Other Rx Dosing/Monitoring    atorvastatin (LIPITOR) tablet 10 mg  10 mg Oral DAILY    vancomycin (VANCOCIN) 750 mg in 0.9% sodium chloride 250 mL (VIAL-MATE)  750 mg IntraVENous ONCE    acetaminophen (TYLENOL) tablet 650 mg  650 mg Oral Q4H PRN    Or    acetaminophen (TYLENOL) solution 650 mg  650 mg Per NG tube Q4H PRN    Or    acetaminophen (TYLENOL) suppository 650 mg  650 mg Rectal Q4H PRN    aspirin tablet 325 mg  325 mg Oral DAILY    heparin (porcine) injection 5,000 Units  5,000 Units SubCUTAneous Q8H

## 2021-11-30 NOTE — PROGRESS NOTES
Problem: Self Care Deficits Care Plan (Adult)  Goal: *Acute Goals and Plan of Care (Insert Text)  Description:   FUNCTIONAL STATUS PRIOR TO ADMISSION: Patient was independent and active without use of DME.     HOME SUPPORT: The patient lived alone with no local support. Occupational Therapy Goals  Initiated 11/30/2021  1. Patient will perform grooming with minimal assistance within 7 day(s). 2.  Patient will perform upper body dressing and bathing with moderate assistance  within 7 day(s). 3.  Patient will perform lower body dressing and bathing with moderate assistance  within 7 day(s). 4.  Patient will perform toilet transfers to [facility] with moderate assistance within 7 day(s). 5.  Patient will perform all aspects of toileting with moderate assistance  within 7 day(s). 6.  Patient will participate in upper extremity therapeutic exercise/activities with minimum assistance for 10 minutes within 7 day(s). 7.  Patient will engage in full Fugl-Barrett Assessment of Upper Extremity within 7 days. Outcome: Progressing Towards Goal   OCCUPATIONAL THERAPY EVALUATION  Patient: Elkin Batista (83 y.o. female)  Date: 11/30/2021  Primary Diagnosis: Altered mental state [R41.82]  Elevated troponin [R77.8]  Dysarthria [R47.1]        Precautions: fall       ASSESSMENT  Based on the objective data described below, the patient presents with decreased activity tolerance, expressive aphasia, decreased command following (receptive aphasia?), R side inattention, impaired balance, impaired coordination, generalized weakness (R>L side with attempts at formal assessment), and generally poor cognition following admission on 11/29 for AMS and slurred speech. Patient undergoing neuro work-up since admission; MRI revealed several small foci, acute infarct in cerebellar vermis, L occipital, and L parietal.  Patient following 25-50% of commands at best; With alternative + additional cuing follow through improved.   Patient performed functional mobility with significant x2 person assist; She was able to sit EOB but needs constant support d/t significant posterior lean. Patient is very fearful of falling. Patient engaged in ADLs with poor tolerance; She needs max to total A for all ADLs. Patient unable to engage in Fugl-Barrett Assessment of Upper Extremity today d/t confusion and fatigue. Patient would benefit from continued skilled OT to progress towards goals and improve overall independence. Current Level of Function Impacting Discharge (ADLs/self-care): Patient required max A x2 for functional mobility. Patient required max to total A for ADLs. Functional Outcome Measure: The patient unable to engage in full Fugl-Barrett Assessment of UE. Patient scored 0/100 for the Barthel Index. Other factors to consider for discharge: Patient lives alone. She has brother that lives locally and was visiting but he stepped out for tx. Patient will benefit from skilled therapy intervention to address the above noted impairments. PLAN :  Recommendations and Planned Interventions: self care training, functional mobility training, therapeutic exercise, balance training, therapeutic activities, cognitive retraining, endurance activities, neuromuscular re-education, patient education, home safety training, and family training/education, Fugl-Barrett Assessment of Upper Extremity    Frequency/Duration: Patient will be followed by occupational therapy 5 times a week to address goals. Recommendation for discharge: (in order for the patient to meet his/her long term goals)  Therapy up to 5 days/week in rehab setting    This discharge recommendation:  Has been made in collaboration with the attending provider and/or case management    IF patient discharges home will need the following DME: none       SUBJECTIVE:   Patient stated The top of my feet hurt.     OBJECTIVE DATA SUMMARY:   HISTORY:   Past Medical History: Diagnosis Date    Acute MI ([address]     Anxiety     CAD (coronary artery disease)     Hypertension      Past Surgical History:   Procedure Laterality Date    CARDIAC SURG PROCEDURE UNLIST      HX GYN      hysterectomy    HX ORTHOPAEDIC         Expanded or extensive additional review of patient history:     Home Situation  Home Environment: Private residence  Living Alone: Yes  Support Systems: Other Family Member(s)  Patient Expects to be Discharged to[de-identified] Unknown  Current DME Used/Available at Home: None  Tub or Shower Type: Shower    Hand dominance: Right    EXAMINATION OF PERFORMANCE DEFICITS:  Cognitive/Behavioral Status:  Neurologic State: Alert  Orientation Level: Oriented X4  Cognition: Appropriate decision making; Decreased attention/concentration; Impaired decision making; Poor safety awareness  Perception: Cues to maintain midline in sitting  Perseveration: No perseveration noted  Safety/Judgement: Decreased awareness of environment; Lack of insight into deficits    Skin: Intact in the uppers    Edema: None noted in the uppers    Vision/Perceptual:    Tracking: Unable to hold eye position out of midline (more difficulty noted to B lower quad )    Diplopia: No (patient denies diplopia when asked multiple times)      Range of Motion:  Grossly decreased minimally functional in the uppers    Strength:  Grossly decreased minimally functional in the uppers    Coordination:  Fine Motor Skills-Upper: Left Impaired; Right Impaired    Gross Motor Skills-Upper: Left Impaired; Right Impaired    Tone & Sensation:  Tone: normal  Sensation: unable to accurately report during tx today      Balance:  Sitting: Impaired  Sitting - Static: Poor (constant support)  Sitting - Dynamic: Poor (constant support)  Standing: Impaired  Standing - Static: Poor  Standing - Dynamic : Poor    Functional Mobility and Transfers for ADLs:  Bed Mobility:  Rolling: Assist x2; Additional time;  Moderate assistance  Supine to Sit: Maximum assistance; Assist x2; Additional time  Sit to Supine: Maximum assistance; Assist x2; Additional time  Scooting: Maximum assistance; Assist x2; Additional time    Transfers:  Sit to Stand: Maximum assistance; Assist x2; Additional time  Stand to Sit: Maximum assistance; Assist x2; Additional time    ADL Assessment:  Feeding:  (simulating feeding)    Oral Facial Hygiene/Grooming: Maximum assistance    Bathing: Total assistance    Upper Body Dressing: Total assistance    Lower Body Dressing: Total assistance    Toileting: Total assistance     Cognitive Retraining  Safety/Judgement: Decreased awareness of environment; Lack of insight into deficits    Functional Measure:    Barthel Index:  Bathin  Bladder: 0  Bowels: 0  Groomin  Dressin  Feedin  Mobility: 0  Stairs: 0  Toilet Use: 0  Transfer (Bed to Chair and Back): 0  Total: 0/100      The Barthel ADL Index: Guidelines  1. The index should be used as a record of what a patient does, not as a record of what a patient could do. 2. The main aim is to establish degree of independence from any help, physical or verbal, however minor and for whatever reason. 3. The need for supervision renders the patient not independent. 4. A patient's performance should be established using the best available evidence. Asking the patient, friends/relatives and nurses are the usual sources, but direct observation and common sense are also important. However direct testing is not needed. 5. Usually the patient's performance over the preceding 24-48 hours is important, but occasionally longer periods will be relevant. 6. Middle categories imply that the patient supplies over 50 per cent of the effort. 7. Use of aids to be independent is allowed. Score Interpretation (from [address] [place] [address])    Independent   60-79 Minimally independent   40-59 Partially dependent   20-39 Very dependent   <20 Totally dependent     -Abraham Nagy., Barthel, D.W. (1965).  Functional evaluation: the Barthel Index. [facility] ([address] (1997). The Barthel activities of daily living index: self-reporting versus actual performance in the old (> or = 75 years). Journal of [facility]4 [facility] 45(7), [facility], AJ, Neil Johnson., Merrick Hanson. (1999). Measuring the change in disability after inpatient rehabilitation; comparison of the responsiveness of the Barthel Index and Functional [place] Measure. Journal of Neurology, Neurosurgery, and Psychiatry, 66(4), 000-256. TELMA Dave, LOIDA Ledesma, & Narciso Lawton M.A. (2004) Assessment of post-stroke quality of life in cost-effectiveness studies: The usefulness of the Barthel Index and the EuroQoL-5D. Quality of Life Research, 15, 799-14          Occupational Therapy Evaluation Charge Determination   History Examination Decision-Making   LOW Complexity : Brief history review  LOW Complexity : 1-3 performance deficits relating to physical, cognitive , or psychosocial skils that result in activity limitations and / or participation restrictions  LOW Complexity : No comorbidities that affect functional and no verbal or physical assistance needed to complete eval tasks       Based on the above components, the patient evaluation is determined to be of the following complexity level: LOW   Activity Tolerance:   Fair and Poor    After treatment patient left in no apparent distress:    Supine in bed, Call bell within reach, and Bed / chair alarm activated    COMMUNICATION/EDUCATION:   The patients plan of care was discussed with: Physical therapist, Registered nurse, and patient . Patient was educated regarding her deficit(s)  as this relates to her diagnosis of CVA. She demonstrated Poor  understanding. Patient and/or family was verbally educated on the BE FAST acronym for signs/symptoms of CVA and TIA.  Informed patient to refer to the Stroke Binder for further BE FAST information. All questions answered with patient indicating poor understanding. Patient/family have participated as able in goal setting and plan of care. and Patient/family agree to work toward stated goals and plan of care. This patients plan of care is appropriate for delegation to MARJORIE.     Thank you for this referral.  Darrow Lennox, OTR/L  Time Calculation: 37 mins

## 2021-11-30 NOTE — PROGRESS NOTES
[facility] Progress Note: Antimicrobial Stewardship     Consult for antibiotic dosing of Vancomycin by Dr. Santana Courts  Indication: SSTI  Day of Therapy: 2/7     Plan:  Vancomycin therapy:  Current dose: by level  Last level 4.1 mcg/ml drawn 24 hrs post initial dose  Ordered 750 mg x 1 dose - continue to dose by level    Pharmacy to follow daily.   Pharmacist Chhaya Delacruz                    VRYEPMK:[phone]

## 2021-11-30 NOTE — CONSULTS
Palliative Medicine Consult  [facility]: [phone] ([phone])    Patient Name: Elkin Batista  YOB: 1928    Date of Initial Consult: 2021  Reason for Consult: Care Decisions  Requesting Provider: Dr. Yeyo Miranda   Primary Care Physician: Unknown, Provider, MD     SUMMARY:   Elkin Batista is a 80 y.o. with a past history of HTN,  CHF, EF 30% (), CAD, MI (), Hypothyroidism and Anxiety, who was admitted on 2021 from Home with a diagnosis of Acute CVA, SAMMY and  Leukocytosis. Patient presented to ER w/ cc of new confusion, slurred speech and weakness. Chart review- Neurology following encephalopathy and  acute CVA, EEG pending. Cardiology following, carotid doppler and echo pending. Current medical issues leading to Palliative Medicine involvement include: Care decisions in setting of advanced age, advanced heart disease and  acute stroke resulting in right sided weakness. 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 She is , had 1 son who is . Patient lives in her own home, drives, grocery shops, but her brother, nieces and nephews check on her daily. Baseline cognitive and functional status: Alert and oriented, family deny cognitive impairments. She is Independent with all ADLs, still drives, shops for herself, enjoys going to [employer]. PALLIATIVE DIAGNOSES:   1. Palliative care encounter  2. Altered mental status, unspecified  3. Impaired communication  4. Cachexia  5. Weakness, generalized  6. Advanced care Planning discussion  7. DNR discussion  8. Goals of care discussion       PLAN:   1. Prior to visit completed chart review. 2. Patient was seen, her brother Clementina and niece Roman Doctor arrived towards end of visit. Patient laying bed awake, alert and at least oriented to people and place, able to follow simple commands.  She is dysarthric,s peech  intermittently unintelligible. 3. Cachexia - she is malnourished appearing, cachectic, w/ temporal wasting and retracted intercostal spaces. Albumin 2.9. Speech pathologist evaluated patient, is HR aspiration,  recommended purees with thins. Family deny patient having difficulty swallowing PTA, Though, family acknowledges she has lost quite a bit of weight over past several years. EMR indicates she has had a  25 lb weight loss since her last hospitalization in 2013.  4. Weakness, generalized- Worsening 2/2 CVA and associated right sided weakness and cognitive deficits. PT/OT to evaluate and treat patient. 5. DNR Discussion- Patient continues to have a Full Code status. Discussed CPR with [facility], discussed benefits vs burdens, recommended DNR, however family thinks patient would want CPR. 6. Goals of care discussion- Family wants to continue full restorative treatments and interventions at this time. Family verbalized their understanding that she will not likely be able to return to her own home in near future and they  are considering SNF at discharge, with hopes she will be able to gain strength and be able to eventually  return home. · We also spent time discussing possibility patient may never return home by herself, that she may always need atc care. We briefly discussed where atc care can occur, including her home or a family members home, with family and/or hired CGs, or possibly LTC facility or group home setting. 7. Psychosocial hx and baseline cognitive and functional status assessed, see info above. 8.  Palliative team will continue to follow with you. 9. Initial consult note routed to primary continuity provider and/or primary health care team members  10.  Communicated plan of care with: Palliative [facility] Team, April RN, Dr. Mary Hill / TREATMENT PREFERENCES:     GOALS OF CARE:       TREATMENT PREFERENCES:   Code Status: Full Code    Patient and family's personal goals include: being in her own home, independent    Important upcoming milestones or family events: unknown    The patient identifies the following as important for living well: unknown      Advance Care Planning:  [x] [facility] Interdisciplinary Team has updated the ACP Navigator with [address] and Patient Capacity      Primary Decision Maker (Active): Rufina Alvarado - [phone]    Secondary Decision Maker: Merline Lolling (nephew) - Other Relative - [phone]    Secondary Decision Maker: Stefany Etienne (niece) - Other Relative - [phone]  No flowsheet data found. Other:    As far as possible, the palliative care team has discussed with patient / health care proxy about goals of care / treatment preferences for patient. HISTORY:     History obtained from: medical records/family    CHIEF COMPLAINT: denied    HPI/SUBJECTIVE:    The patient is:   [] Verbal and participatory  [x] Non-participatory due to:   Patient denied having any concerns, denied pain, but due to her dysarthria, I was unable to understand much of her speech.        Clinical Pain Assessment (nonverbal scale for severity on nonverbal patients):              Duration: for how long has pt been experiencing pain (e.g., 2 days, 1 month, years)  Frequency: how often pain is an issue (e.g., several times per day, once every few days, constant)     FUNCTIONAL ASSESSMENT:     Palliative Performance Scale (PPS):          PSYCHOSOCIAL/SPIRITUAL SCREENING:     Palliative IDT has assessed this patient for cultural preferences / practices and a referral made as appropriate to needs (Cultural Services, Patient Advocacy, Ethics, etc.)    Any spiritual / Jewish concerns:  [] Yes /  [x] No    Caregiver Burnout:  [] Yes /  [x] No /  [] No Caregiver Present      Anticipatory grief assessment:   [x] Normal  / [] Maladaptive       ESAS Anxiety:      ESAS Depression:          REVIEW OF SYSTEMS:     Positive and pertinent negative findings in ROS are noted above in HPI. The following systems were [x] reviewed / [x] unable to be reviewed as noted in HPI  Other findings are noted below. Systems: constitutional, ears/nose/mouth/throat, respiratory, gastrointestinal, genitourinary, musculoskeletal, integumentary, neurologic, psychiatric, endocrine. Positive findings noted below. Modified ESAS Completed by: provider                                   Stool Occurrence(s): 0        PHYSICAL EXAM:     From RN flowsheet:  Wt Readings from Last 3 Encounters:   11/29/21 102 lb (46.3 kg)   04/18/13 122 lb (55.3 kg)     Blood pressure (!) 147/91, pulse 93, temperature 97.5 °F (36.4 °C), resp. rate 18, height 5' 4\" (1.626 m), weight 102 lb (46.3 kg), SpO2 94 %. Pain Scale 1: Numeric (0 - 10)  Pain Intensity 1: 0                 Last bowel movement, if known:     Constitutional: appears stated age, cachectic, frail, NAD  Eyes: pupils equal, anicteric  ENMT: no nasal discharge, dry mucous membranes  Cardiovascular: regular rhythm, distal pulses intact  Respiratory: breathing not labored, symmetric  Gastrointestinal: soft non-tender, +bowel sounds  Musculoskeletal: no deformity, no tenderness to palpation  Skin: warm, dry  Neurologic: following simple  commands, weakly moving all extremities, right weaker than left, legs weaker than upper extremities, speech is garbled, difficult to understand.    Psychiatric: appropriate affect, unable to assess for  hallucinations  Other:       HISTORY:     Active Problems:    Altered mental state (11/29/2021)      Elevated troponin (11/29/2021)      Dysarthria (11/29/2021)      Past Medical History:   Diagnosis Date    Acute MI ([facility] [address]     Anxiety     CAD (coronary artery disease)     Hypertension       Past Surgical History:   Procedure Laterality Date    CARDIAC SURG PROCEDURE UNLIST      HX GYN      hysterectomy    HX ORTHOPAEDIC        Family History   Problem Relation Age of Onset    Other Unknown         unknown      History reviewed, no pertinent family history. Social History     Tobacco Use    Smoking status: Never Smoker    Smokeless tobacco: Not on file   Substance Use Topics    Alcohol use: No     No Known Allergies   Current Facility-Administered Medications   Medication Dose Route Frequency    cefepime (MAXIPIME) 2 g in sterile water (preservative free) 10 mL IV syringe  2 g IntraVENous Q12H    acetaminophen (TYLENOL) tablet 650 mg  650 mg Oral Q4H PRN    Or    acetaminophen (TYLENOL) solution 650 mg  650 mg Per NG tube Q4H PRN    Or    acetaminophen (TYLENOL) suppository 650 mg  650 mg Rectal Q4H PRN    0.9% sodium chloride infusion  75 mL/hr IntraVENous CONTINUOUS    aspirin tablet 325 mg  325 mg Oral DAILY    heparin (porcine) injection 5,000 Units  5,000 Units SubCUTAneous Q8H          LAB AND IMAGING FINDINGS:     Lab Results   Component Value Date/Time    WBC 30.2 (H) 11/30/2021 10:13 AM    HGB 16.9 (H) 11/30/2021 10:13 AM    PLATELET 167 04/32/7703 10:13 AM     Lab Results   Component Value Date/Time    Sodium 143 11/30/2021 10:13 AM    Potassium 4.3 11/30/2021 10:13 AM    Chloride 111 (H) 11/30/2021 10:13 AM    CO2 22 11/30/2021 10:13 AM    BUN 52 (H) 11/30/2021 10:13 AM    Creatinine 1.48 (H) 11/30/2021 10:13 AM    Calcium 9.3 11/30/2021 10:13 AM    Magnesium 2.1 04/18/2013 04:00 AM    Phosphorus 3.7 04/16/2013 01:10 AM      Lab Results   Component Value Date/Time    Alk.  phosphatase 98 11/30/2021 10:13 AM    Protein, total 7.0 11/30/2021 10:13 AM    Albumin 2.6 (L) 11/30/2021 10:13 AM    Globulin 4.4 (H) 11/30/2021 10:13 AM     Lab Results   Component Value Date/Time    INR 1.2 (H) 04/16/2013 01:10 AM    Prothrombin time 12.4 (H) 04/16/2013 01:10 AM    aPTT 31.5 04/16/2013 01:10 AM      No results found for: IRON, FE, TIBC, IBCT, PSAT, FERR   No results found for: PH, PCO2, PO2  No components found for: [place]   Lab Results   Component Value Date/Time     11/29/2021 12:45 PM    CK - MB 0.9 04/16/2013 01:10 AM                Total time: 70 min  Counseling / coordination time, spent as noted above: 50 min  > 50% counseling / coordination?: yes    Prolonged service was provided for  []30 min   []75 min in face to face time in the presence of the patient, spent as noted above. Time Start:   Time End:   Note: this can only be billed with 13973 (initial) or 13821 (follow up). If multiple start / stop times, list each separately.

## 2021-11-30 NOTE — PROGRESS NOTES
Cardiology Initial Care Encounter    Patient: Frederick Guzman MRN: [id number]     YOB: 1928  Age: 80 y.o. Sex: female      Admit Date: 11/29/2021       Assessment/Plan     1.  elevated troponin:  in absence of chest pain. Check additional troponin. Pt is pain free this am. ECHO pending  2. Hx ischemic cardiomyopathy:  EF 30% in 2013. Noted pulmonary edema on CT chest. Resume toprol XL when ok with neurology. Does not appear volume overloaded. Add pBNP. ECHO today. 3.  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 Neurology to see. 4.  SAMMY: hold ace      Would consult palliative medicine for goals of care/multiple co morbidities           HPI     Frederick Guzman is a 80 y.o.    female  with PMH of ICM EF 30% in 2013 who was admitted with altered mentation and slurred speech. EMS was called by someone who came to her house to check on her so we do not know her last known normal.     History obtained primarily from chart. Patient's only complaint is weakness but she says that has been an ongoing issue. She states \"this happens at times\" but does not elaborate much on what she means despite probing.     CT chest shows  Mild pulmonary edema, small pleural effusions, small ascites, and anasarca. Cardiomegaly and small pericardial effusion.     MRI head shows: Several small foci of acute infarction in the cerebellar vermis, left occipital lobe, and left parietal lobe. The patient has been referred to cardiology for on going management of elevated troponin      The patient denies chest pain, dependent edema, diaphoresis, CASTELLANO, orthopnea, palpitations, PND, shortness of breath, claudication or syncope. No bleeding .               Review of Symptoms:  Constitutional: positive for fatigue  ENT: negative   Respiratory: negative for dyspnea on exertion  Gastrointestinal: negative for dyspepsia  Genitourinary: no dysuria, hematuria, frequency   Musculoskeletal:negative for back pain  Neurological: positive for memory problems  Other systems reviewed and negative except as above. Previous cardiac hx  ECHO: 4/17/13: EF 30 %. severe diffuse hypokinesis. LVH, grade 2 DD , mild MR,    Risk factors:   Post menopausal female     Social History     Tobacco Use    Smoking status: Never Smoker    Smokeless tobacco: Not on file   Substance Use Topics    Alcohol use: No     Family History   Problem Relation Age of Onset    Other Unknown         unknown       Current Facility-Administered Medications   Medication Dose Route Frequency    acetaminophen (TYLENOL) tablet 650 mg  650 mg Oral Q4H PRN    Or    acetaminophen (TYLENOL) solution 650 mg  650 mg Per NG tube Q4H PRN    Or    acetaminophen (TYLENOL) suppository 650 mg  650 mg Rectal Q4H PRN    0.9% sodium chloride infusion  50 mL/hr IntraVENous CONTINUOUS    aspirin tablet 325 mg  325 mg Oral DAILY    heparin (porcine) injection 5,000 Units  5,000 Units SubCUTAneous Q8H       Objective:     Vitals:    11/29/21 2300 11/29/21 2324 11/30/21 0243 11/30/21 0623   BP:  138/85 139/83 128/79   Pulse: 78 76 79 73   Resp:  20 16 17   Temp:  97.2 °F (36.2 °C) 97.6 °F (36.4 °C) 97.4 °F (36.3 °C)   SpO2:  95% 96% 97%   Weight:       Height:            Intake and Output:  Current Shift: No intake/output data recorded. Last three shifts: No intake/output data recorded. Gen: Well-developed, well-nourished, in no acute distress  Neck: Supple,No JVD, No Carotid Bruits  Resp: No accessory muscle use, diminished bases   Card: Regular Rate,  Rythm,Normal S1, S2, 2/6 systolic murmurs, no rub.    Abd:  Soft, non-tender, non-distended,BS+,   MSK: No cyanosis  Skin: No rashes    Neuro: moving all four extremities , follows commands appropriately  Psych:  Fair insight, oriented to person, alert, Nml Affect  LE: No edema    EKG:  EKG Results     Procedure 720 Value Units Date/Time    EKG, 12 LEAD, INITIAL [[id number]] Collected: 11/29/21 1249    Order Status: Completed Updated: 11/29/21 1251     Ventricular Rate 81 BPM      Atrial Rate 81 BPM      P-R Interval 146 ms      QRS Duration 110 ms      Q-T Interval 386 ms      QTC Calculation (Bezet) 448 ms      Calculated P Axis 80 degrees      Calculated R Axis -114 degrees      Calculated T Axis 44 degrees      Diagnosis --     Sinus rhythm with marked sinus arrhythmia  Possible Left atrial enlargement  Right superior axis deviation  Low voltage QRS  Inferior infarct (cited on or before 15-APR-2013)  Cannot rule out Anteroseptal infarct (cited on or before 15-APR-2013)  Abnormal ECG  When compared with ECG of 15-APR-2013 15:28,  Significant changes have occurred            TELEMETRY: SR     Lab/Data Review:  BMP:   Lab Results   Component Value Date/Time     11/29/2021 12:45 PM    K 4.5 11/29/2021 12:45 PM     11/29/2021 12:45 PM    CO2 26 11/29/2021 12:45 PM    AGAP 11 11/29/2021 12:45 PM     (H) 11/29/2021 12:45 PM    BUN 53 (H) 11/29/2021 12:45 PM    CREA 1.91 (H) 11/29/2021 12:45 PM    GFRAA 30 (L) 11/29/2021 12:45 PM    GFRNA 25 (L) 11/29/2021 12:45 PM     CBC:   Lab Results   Component Value Date/Time    WBC 26.2 (H) 11/29/2021 12:45 PM    HGB 16.8 (H) 11/29/2021 12:45 PM    HCT 55.2 (H) 11/29/2021 12:45 PM     (H) 11/29/2021 12:45 PM     All Cardiac Markers in the last 24 hours:   Lab Results   Component Value Date/Time     11/29/2021 12:45 PM        Signed By: Lady Leonid NP     November 30, 2021

## 2021-11-30 NOTE — PROGRESS NOTES
Spiritual Care Assessment/Progress Note  [address]      NAME: Frederick Guzman      MRN: [id number]  AGE: 80 y.o. SEX: female  Cheondoism Affiliation: Samaritan   Language: English     11/30/2021     Total Time (in minutes): 15     Spiritual Assessment begun in [facility] 3 PRO CARE TELE 2 through conversation with:         []Patient        [] Family    [] Friend(s)        Reason for Consult: Palliative Care, Initial/Spiritual Assessment     Spiritual beliefs: (Please include comment if needed)     [] Identifies with a bettina tradition:         [] Supported by a bettina community:            [] Claims no spiritual orientation:           [] Seeking spiritual identity:                [] Adheres to an individual form of spirituality:           [x] Not able to assess:                           Identified resources for coping:      [] Prayer                               [] Music                  [] Guided Imagery     [] Family/friends                 [] Pet visits     [] Devotional reading                         [x] Unknown     [] Other:                                               Interventions offered during this visit: (See comments for more details)    Patient Interventions: Initial visit           Plan of Care:     [] Support spiritual and/or cultural needs    [] Support AMD and/or advance care planning process      [] Support grieving process   [] Coordinate Rites and/or Rituals    [] Coordination with community clergy   [] No spiritual needs identified at this time   [] Detailed Plan of Care below (See Comments)  [] Make referral to Music Therapy  [] Make referral to Pet Therapy     [] Make referral to Addiction services  [] Make referral to [facility]  [] Make referral to Spiritual Care Partner  [] No future visits requested        [x] Contact Spiritual Care for further referrals     Attempted visit for palliative initial spiritual assessment.  Unable to visit patient at this time as she was ERIC for testing. No family present. Pt's chart was consulted.   Chaplain Chang MDiv, MS, [facility]

## 2021-11-30 NOTE — ACP (ADVANCE CARE PLANNING)
Advance Care Planning   Advance Care Planning Inpatient Note  [facility]    Today's Date: 11/30/2021  Unit: [facility] 3 PROG CARE TELE 2    Received request from family. Upon review of chart and communication with care team, patient's decision making abilities are not in question. Patient and Brother and nephew  was/were present in the room during visit. Goals of ACP Conversation:  Discuss Advance Care planning documents    Health Care Decision Makers:      Primary Decision Maker (Active): Trupti Medellin - Brother - [phone]    Secondary Decision Maker: Mathew June (nephew) - Other Relative - [phone]      Summary:  Completed [address]    Advance Care Planning Documents (Patient Wishes) on file:  Healthcare Power of /Advance Directive appointment of Health care agent     Assessment:    Engaged pt in an AMD discussion as she had no such document. Her closest relatives are siblings. Pt choose to complete an AMD. She names her brother Duong Lozano ([phone]) as mPOA and her nephew Latisha Bustamante ([phone]) as secondary.       Interventions:  Provided education on documents for clarity and greater understanding  Discussed and provided education on state decision maker hierarchy  Assisted in the completion of documents according to patient's wishes at this time  Encouraged ongoing ACP conversation with future decision makers and loved ones    Care Preferences Communicated:  No    Outcomes/Plan:  New Advance Directive completed  Returned original document(s) to patient, as well as copies for distribution to appointed agents  Copy of Advance Directive given to staff to scan into medical record  Teach Back Method used to verify the patient/Healthcare Decision Maker's understanding of key information in the advance directive documents    Sonia Ortega [facility] on 11/30/2021 at 3:54 PM

## 2021-11-30 NOTE — WOUND CARE
Wound Consult:  New Patient Visit. Chart reviewed. Consulted for sacral redness noted POA. Spoke with patients Eleni salvador at bedside and Dr. Gordo Kunz at bedside. Patient is resting on a [place] bed with air support mattress. Heels off loaded with boots. Patient is alert; requires assist of at least one to move side to side in bed. Michele score 15. Assessment:  Sacrum - red over bony prominence and towards left of sacrum - blanching to slow to vernon. Skin remains intact. Inner right buttock - kimberly discoloration, non-blanching - ? Stage 1 POA. No redness to heels. Treatment:  Reseated Mepilex Sacral dressing over buttocks/sacrum. Turned and repositioned. Wound Recommendations:  Preventative sacral dressing - change every three days. Skin Care / PI Prevention Recommendations:  1. Minimize friction/shear: minimize layers of linen/pads under patient. Mepilex Sacral dressing. 2. Off load pressure/reposition:turn and reposition approximately every 2 hours; float heels with pillows or use off loading heel boots; waffle cushion for sitting. 3. Manage Moisture - keep skin folds dry; incontinence skin care with incontinence wipes; currently with brief and pure wick in place. 4. Continue to monitor nutrition, pain, and skin risk scale, and skin assessment. Plan:  Spoke with Dr. Gordo Kunz regarding findings and proposed orders for treatment. We will continue to reassess routinely and as needed. Please re-consult should concerns arise despite continued skin/PI prevention measures.     Zoë Mccoy, MSN, RN, [facility], Wound / [address] [facility] Office [phone]

## 2021-11-30 NOTE — PROGRESS NOTES
[facility] Dosing Services: 11/30/21      The following medication: Cefepime was automatically dose-adjusted per [facility] P&T Committee Protocol, with respect to renal function. Consult provided for this   80 y.o. , female , for the indication of sepsis. Dosage changed to:  Cefepime 2gm IV every 24 hrs    Pt Weight:   Wt Readings from Last 1 Encounters:   11/29/21 46.3 kg (102 lb)         Previous Regimen Cefepime 2gm IV every 12 hrs   Serum Creatinine Lab Results   Component Value Date/Time    Creatinine 1.48 (H) 11/30/2021 10:13 AM       Creatinine Clearance Estimated Creatinine Clearance: 17.4 mL/min (A) (based on SCr of 1.48 mg/dL (H)). BUN Lab Results   Component Value Date/Time    BUN 52 (H) 11/30/2021 10:13 AM           Additional notes:      Pharmacy to continue to monitor patient daily. Will make dosage adjustments based upon changing renal function. Signed Redia Plan A.  [address] [place] [address] information:  [phone]

## 2021-11-30 NOTE — PROGRESS NOTES
Reason for Admission:   Altered mental status, slurred speech, R/O CVA. Hx Cardiomyopathy with EF 30%, HTN, CAD. Assessment completed at bedside with patient's brother and nephew. COVID19 Vaccine:  no                     RUR Score:     14%/ low risk             PCP: First and Last name:   Elvi Ansari MD     Name of Practice:    Are you a current patient: Yes/No: yes   Approximate date of last visit:  unsure   Can you participate in a virtual visit if needed:  no    Do you (patient/family) have any concerns for transition/discharge? Patient has been living alone, EMS was called to check on patient, brought to ER. Plan for utilizing home health:   Unsure at this time,no prior use of home health services. No DME. Current Advanced Directive/Advance Care Plan:  Full Code    Healthcare Decision Maker:   Click here to complete HealthCare Decision Makers including selection of the Healthcare Decision Maker Relationship (ie \"Primary\")            Primary Decision Maker (Active): Serenity Austin - Brother - [phone]    Secondary Decision Maker: Hillary Sim (nephew) - Other Relative - [phone]    Secondary Decision Maker: Rose Lay (niece) - Other Relative - [phone]    Transition of Care Plan:        Chart reviewed, demographics verified. CM role and follow up discussed. Met with patient, her brother, and her nephew at bedside, face mask on. Patient lives alone. Patient is very private and does not allow anyone to assist her at home. Patient lives in a one story apartment home with 2 steps and a ramp to enter home. Patient has prescription drug coverage, uses [facility]  pharmacy on [address]. Patient is weak and has was found ill at home, unsure of her ability to care for herself at this time. Current status:  Patient currently requiring medical management including IV antibiotics. Ongoing assessment and evaluation, patient confused.   Speech therapy recommending strict aspiration precautions with supervision with po intake. PT/OT evaluated patient today, await recommendations. Cardiology, neurology, and palliative consults. PLAN:  1. Monitor patient response to treatment and recommendations. 2. Medical management continues. 3. Unsure of DC needs at this time, await PT/OT evaluations and clinical progress. 4. Family to make decisions re: decision maker, choices. 5. CM to monitor clinical progress and disposition recommendations. Care Management Interventions  PCP Verified by CM: Yes (Dr. Radha Bae)  Mode of Transport at Discharge:  Other (see comment) (per rachel Richards)  Transition of Care Consult (CM Consult): Discharge Planning  Physical Therapy Consult: Yes  Occupational Therapy Consult: Yes  Speech Therapy Consult: Yes  Support Systems: Other Family Member(s)  Confirm Follow Up Transport: Family  The Plan for Transition of Care is Related to the Following Treatment Goals : R/O CVA  Discharge Location  Discharge Placement: Unable to determine at this time    Nuzhat Duran, RN, MSN, Care manager

## 2021-12-01 NOTE — PROGRESS NOTES
TK spoke to patient's nurse informing her that we will start the nuclear medicine study tomorrow morning around 9 am. We also requested that she should have a working IV so that we can draw blood to send to radiopharmacy for processing.  We return tagged WBC's at around noon and image the patient around 3 pm.

## 2021-12-01 NOTE — CONSULTS
Palliative medicine brief note-    Chart reviewed. Met with patient, her brother Keenan Gregg and nephew Zaidacammie Ray at bedside, provided medical update. Discussed CPR in setting of advanced age, frailty and and ES cardiac disease, recommended DNR. Mac agreed to DNR. Order placed in EMR. I notified his nurse Rachel KHAN and attending Dr. Franklin . GOC- Short Term Plan-Discharge to SNF w/ plan to transition to LTC after therapy completed.  GOC will be revisited as needed

## 2021-12-01 NOTE — PROGRESS NOTES
Problem: Self Care Deficits Care Plan (Adult)  Goal: *Acute Goals and Plan of Care (Insert Text)  Description:   FUNCTIONAL STATUS PRIOR TO ADMISSION: Patient was independent and active without use of DME.     HOME SUPPORT: The patient lived alone with no local support. Occupational Therapy Goals  Initiated 11/30/2021  1. Patient will perform grooming with minimal assistance within 7 day(s). 2.  Patient will perform upper body dressing and bathing with moderate assistance  within 7 day(s). 3.  Patient will perform lower body dressing and bathing with moderate assistance  within 7 day(s). 4.  Patient will perform toilet transfers to [facility] with moderate assistance within 7 day(s). 5.  Patient will perform all aspects of toileting with moderate assistance  within 7 day(s). 6.  Patient will participate in upper extremity therapeutic exercise/activities with minimum assistance for 10 minutes within 7 day(s). 7.  Patient will engage in full Fugl-Barrett Assessment of Upper Extremity within 7 days. Outcome: Progressing Towards Goal   OCCUPATIONAL THERAPY TREATMENT  Patient: Cara Morrow (89 y.o. female)  Date: 12/1/2021  Diagnosis: Altered mental state [R41.82]  Elevated troponin [R77.8]  Dysarthria [R47.1]   Acute CVA (cerebrovascular accident) [facility])       Precautions:  fall  Chart, occupational therapy assessment, plan of care, and goals were reviewed. ASSESSMENT  Patient continues with skilled OT services and is progressing towards goals. Ms. Maria Fernanda Holder presents today with improved activity tolerance, following 100% of commands, and improved ability to express needs despite expressive aphasia. She continues to be limited by impaired balance, R side weakness, impaired coordination, and mild R side inattention although did show improvements today. Patient was re/educated on the principles of neuro plasticity and neuro re-education exercises specific to strength and coordination for the R upper. Activity tolerance was good overall. Patient was able to come to sitting EOB with x2 person assist.  Seated activity focused on midline orientation, propped sitting to faciltiate B UE WB in preparation for sit to stand transfers, and lateral leans to facilitate WB + push/pull of B uppers. Patient stood and took several steps to [facility] for repositioning. She continues to be fearful of falling and needs near constant reassurance. Patient is making good progress overall and would benefit from continued skilled OT. Current Level of Function Impacting Discharge (ADLs): Patient required mod A x2 for bed mobility and max A x2 for sit to stand transfers and took several steps to [facility] with x2 person assist.  Patient requires max A for LB dressing. Patient with good tolerance of neuro re-education exercises/activity in supine and sitting position. PLAN :  Patient continues to benefit from skilled intervention to address the above impairments. Continue treatment per established plan of care to address goals. Recommend with staff:   Recommend patient be placed in chair position frequently throughout the day. For toileting needs, recommend bed level; Not yet ready for [facility] but will progress towards this next tx. Encourage patient involvement in personal care as able. Encourage exercises frequently throughout the day. Recommend next OT session: Continue towards set goals; Needs FMA-UE outcome measure for R UE    Recommendation for discharge: (in order for the patient to meet his/her long term goals)  Therapy up to 5 days/week in rehab setting    This discharge recommendation:  Has been made in collaboration with the attending provider and/or case management    IF patient discharges home will need the following DME: none       SUBJECTIVE:   Patient stated Don't let me fall, honey.     OBJECTIVE DATA SUMMARY:   Cognitive/Behavioral Status:  Neurologic State: Alert  Orientation Level: Oriented to person; Oriented to place; Disoriented to time; Disoriented to situation  Cognition: Follows commands  Perception: Cues to maintain midline in sitting  Perseveration: No perseveration noted  Safety/Judgement: Awareness of environment    Functional Mobility and Transfers for ADLs:  Bed Mobility:  Rolling: Moderate assistance; Assist x2  Supine to Sit: Moderate assistance; Assist x2  Sit to Supine: Moderate assistance; Assist x2  Scooting: Moderate assistance; Assist x2    Transfers:  Sit to Stand: Maximum assistance; Assist x2    Balance:  Sitting: Impaired; High guard  Sitting - Static: Fair (occasional)  Sitting - Dynamic: Fair (occasional)  Standing: Impaired; Pull to stand; With support  Standing - Static: Poor  Standing - Dynamic : Poor    ADL Intervention:  Lower Body Dressing Assistance  Dressing Assistance: Maximum assistance  Socks: Maximum assistance     Cognitive Retraining  Safety/Judgement: Awareness of environment    Neuro Re-Education:  Patient was re/educated on the principles of neuro plasticity and neuro re-education exercises specific to strength and coordination for the R upper. Exercises completed from supine position in bed. Patient was encouraged to complete simple R, dominant UE strength exercises as able. Patient educated on the importance of not only using but also drawing as much attention as able to the R side. Patient also instructed to visualize the limb when completing exercises for improved accuracy by providing visual input. The following exercises were performed with good tolerance: shoulder flexion/extension, shoulder ab/adduction, elbow flexion/extension, wrist flexion/extension, finger flexion/extension, forearm supination/pronation. Patient fatigues easily at which point he requires AAROM to complete full ROM distally; She needs active assist proximally for all shoulder exercises.   Educated patient on how to provided active assist support by using L UE to move R UE through full range. Patient was educated on coordination exercises. Patient was instructed to touch nose with R hand then reach out and touch knee; First visualizing, then to close eyes as a progression. Patient was encouraged to do this at a steady tempo and to speed up as able as an additional progression. Also, instructed to make target smaller for progression (i.e. Reaching for smaller object on the table). Patient instructed to make one progression at a time in order to report what progression he was more successful with for future adjustments in ex program.     Seated activity focused on midline orientation, propped sitting to faciltiate B UE WB in preparation for sit to stand transfers, and lateral leans to facilitate WB + push/pull of B uppers. Activity Tolerance:   Good      After treatment patient left in no apparent distress:   Supine in bed, Call bell within reach, and Bed / chair alarm activated    COMMUNICATION/COLLABORATION:   The patients plan of care was discussed with: Physical therapist, Registered nurse, and patient . Patient was educated regarding her deficit(s) as this relates to her diagnosis of CVA. She demonstrated Fair understanding. Patient and/or family was verbally educated on the BE FAST acronym for signs/symptoms of CVA and TIA. Informed patient to refer to the Stroke Binder for further BE FAST information. All questions answered with patient indicating fair to poor understanding.      Porfirio Mitchell, OTR/L  Time Calculation: 45 mins

## 2021-12-01 NOTE — PROGRESS NOTES
Negrita Warner MD, [address] [place] [address]  [place] [address]  [phone]      IMPRESSION and RECOMMENDATIONS     1. Isch CM:  Minimal abnormal troponin, but no overt signs of ischemia. Echo now shows severe LV dysfunction (EF 15-20%). Typically, I would suggest BB/ACEI/ARB, but given her advanced age and at least some degree of dementia/disorientation, I don't see and overwhelming benefit to this. Cont ASA, statin. No anticoagulation at this time. 2.  AV mass:  \"consistent with papillary fibroelastoma\". Despite leukocytosis, whould not pursue TUNDE unless BCx reveal typical organism. Discussed with Pt and . Subjective:       No complaints. Objective:   Patient Vitals for the past 16 hrs:   BP Temp Pulse Resp SpO2   12/01/21 0705   88     12/01/21 0610 (!) 140/82 97.7 °F (36.5 °C) 98 24 97 %   12/01/21 0304 (!) 149/83 97.8 °F (36.6 °C) (!) 104 24 95 %   12/01/21 0017 (!) 142/95 98.1 °F (36.7 °C) 100 20 99 %   11/30/21 2259   (!) 101     11/30/21 2000   89     11/30/21 1935 115/70 97.8 °F (36.6 °C) 89 18 94 %       HEENT Exam:     WNL         Lung Exam:     The patient is not dyspneic. Breath sounds are heard equally in all lung fields. There are no wheezes, rales, rhonchi, or rubs heard on auscultation. Heart Exam:     The rhythm is regular with ectopy. The PMI is in the 5th intercostal space of the MCL. Apical impulse is normal. S1 is regular. S2 is physiologic. There is no S3, S4 gallop, murmur, click, or rub. Abdomen Exam:     Benign. Extremities Exam:     No cyanosis, clubbing, edema. Distal pulses intact.            Lab Results   Component Value Date/Time    Glucose 112 (H) 12/01/2021 01:03 AM    Sodium 147 (H) 12/01/2021 01:03 AM    Potassium 4.3 12/01/2021 01:03 AM    Chloride 115 (H) 12/01/2021 01:03 AM    CO2 22 12/01/2021 01:03 AM    BUN 52 (H) 12/01/2021 01:03 AM    Creatinine 1.31 (H) 12/01/2021 01:03 AM    Calcium 8.0 (L) 12/01/2021 01:03 AM     Recent Labs     12/01/21  0103 11/30/21  1013   WBC 34.3* 30.2*   HGB 15.3 16.9*   HCT 50.9* 55.6*    391     Recent Labs     11/30/21  1013 11/29/21  1245   ALT 31 27   AP 98 95   TBILI 1.1* 1.3*   TP 7.0 7.2   ALB 2.6* 2.9*   GLOB 4.4* 4.3*     No results for input(s): INR, PTP, APTT, INREXT in the last 72 hours. Recent Labs     11/29/21  1245        No results for input(s): TROIQ in the last 72 hours. Lab Results   Component Value Date/Time    Cholesterol, total 164 11/30/2021 02:23 AM    HDL Cholesterol 31 11/30/2021 02:23 AM    LDL, calculated 100 11/30/2021 02:23 AM    Triglyceride 165 (H) 11/30/2021 02:23 AM    CHOL/HDL Ratio 5.3 (H) 11/30/2021 02:23 AM     Echo (11/30/21):  · Saline contrast was given to evaluate for intracardiac shunt. · LV: Estimated LVEF is 15 - 20%. Normal wall thickness. Mildly dilated left ventricle. Severely and globally reduced systolic function. Severe (grade 3) left ventricular diastolic dysfunction. · RV: Severely reduced systolic function. · LA: Severely dilated left atrium. · RA: Moderately dilated right atrium. · AV: Severe aortic valve sclerosis with no evidence of reduced excursion. 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 .  · MV: Mitral valve thickening. Mild mitral annular calcification. Moderate mitral valve regurgitation is present. · PV: Mild pulmonic valve regurgitation is present. · IVC: Mildly elevated central venous pressure (8 mmHg); IVC diameter is less than 21 mm and collapses less than 50% with respiration.

## 2021-12-01 NOTE — PROGRESS NOTES
Problem: Dysphagia (Adult)  Goal: *Acute Goals and Plan of Care (Insert Text)  Description: Speech pathology goals  Initiated 11/30/2021  1. Patient will tolerate puree/thin liquid diet with no overt s/s aspiration within 7 days  2. Patient will tolerate trials of soft solids with timely/complete mastication and full oral clearance within 7 days  Outcome: Progressing Towards Goal   SPEECH LANGUAGE PATHOLOGY DYSPHAGIA TREATMENT  Patient: Joanie Seip (41 y.o. female)  Date: 12/1/2021  Diagnosis: Altered mental state [R41.82]  Elevated troponin [R77.8]  Dysarthria [R47.1]   Acute CVA (cerebrovascular accident) ([facility] [address]       Precautions: aspiration      ASSESSMENT:  Patient with fair tolerance of purees, thins. She is taking min PO. Some secretion management issues suspected. Mild-moderate dysarthria that fluctuates. Reduced insight into deficits. PLAN:  Recommendations and Planned Interventions:  Continue purees, thins. Ok to crush meds as able. Patient continues to benefit from skilled intervention to address the above impairments. Continue treatment per established plan of care. Discharge Recommendations:  None     SUBJECTIVE:   Patient stated I like my cookies. OBJECTIVE:   Cognitive and Communication Status:  Neurologic State: Alert  Orientation Level: Oriented to person  Cognition: Follows commands  Perception: Appears intact  Perseveration: No perseveration noted  Safety/Judgement: Decreased insight into deficits  Dysphagia Treatment:  Oral Assessment:     P.O. Trials:  Patient Position: upright in bed  Vocal quality prior to P.O.:  (mild-moderate dysarthria: fluctating)  Consistency Presented: Thin liquid  How Presented: SLP-fed/presented;  Successive swallows; Straw   ORAL PHASE:   Bolus Acceptance: No impairment  Bolus Formation/Control: No impairment     Propulsion: No impairment  Oral Residue: None  PHARYNGEAL PHASE:   Initiation of Swallow: No impairment  Laryngeal Elevation: Functional  Aspiration Signs/Symptoms:  (noted possible secretion management issues with saliva before swallow eval. Cleared wiht PO. SOB once after PO. no congestion)  RN reports no congestion. She gave meds crushed in applesauce. She took only a few bites and sips this am. Mostly has been drinking water. Brother reports this is more PO than she has taken for a long time. Her brother reports that she has been mostly eating cookies and drinking chocolate ensure at home      SPEECH:   Dysarthria ranged from mild to mild-moderate. Fluctuated in session. Speech intelligiblity 80% at short sentence level. Noted delayed word-finding vs memory issues at times. Exercises:  Laryngeal Exercises:                                                                                                                                   Pain:  Pain Scale 1: Numeric (0 - 10)  Pain Intensity 1: 0       After treatment:   Patient left in no apparent distress in bed and Nursing notified    COMMUNICATION/EDUCATION:   Patient was educated regarding her deficit(s) of dysphagia, dysarthria as this relates to her diagnosis of CVas. She demonstrated Fair understanding as evidenced by discussion . Brother present and understood. .    The patient's plan of care including recommendations, planned interventions, and recommended diet changes were discussed with: Occupational therapist and Registered nurse.      Brie Little, SLP  Time Calculation: 15 mins

## 2021-12-01 NOTE — PROGRESS NOTES
Neurology Progress Note    Patient ID:  Leanne Estrada  [id number]  74 y.o.  7/7/1928    CC: altered mental status, slurred speech, leg weakness    Subjective:      Patient's mentation is improving with less slurred speech but still having some cognitive impairment and significant leg weakness. Labs done today revealed highly elevated WBC at 34.3, increased sodium at 147, increased creatinine, decreased GFR, increased lactic acid. Carotid Doppler study done revealed right 50 to 69% ICA stenosis and left 0 to 49% ICA stenosis. Echocardiogram done revealed EF of 15 to 20%. Severe left ventricular diastolic dysfunction. Severe aortic valve sclerosis. No shunting. Current Facility-Administered Medications   Medication Dose Route Frequency    cefepime (MAXIPIME) 2 g in sterile water (preservative free) 10 mL IV syringe  2 g IntraVENous Q24H    Vancomycin - Pharmacy to dose by level   Other Rx Dosing/Monitoring    atorvastatin (LIPITOR) tablet 10 mg  10 mg Oral DAILY    acetaminophen (TYLENOL) tablet 650 mg  650 mg Oral Q4H PRN    Or    acetaminophen (TYLENOL) solution 650 mg  650 mg Per NG tube Q4H PRN    Or    acetaminophen (TYLENOL) suppository 650 mg  650 mg Rectal Q4H PRN    aspirin tablet 325 mg  325 mg Oral DAILY    heparin (porcine) injection 5,000 Units  5,000 Units SubCUTAneous Q8H        Review of Systems:    Review of systems not obtained due to patient factors. Objective:     Patient Vitals for the past 8 hrs:   BP Temp Pulse Resp SpO2   12/01/21 0610 (!) 140/82 97.7 °F (36.5 °C) 98 24 97 %   12/01/21 0304 (!) 149/83 97.8 °F (36.6 °C) (!) 104 24 95 %   12/01/21 0017 (!) 142/95 98.1 °F (36.7 °C) 100 20 99 %       No intake/output data recorded. 11/29 1901 - 12/01 0700  In: 8942 [P.O.:530;  I.V.:1010]  Out: 350 [Urine:350]    Lab Review   Recent Results (from the past 24 hour(s))   LACTIC ACID    Collection Time: 11/30/21 10:13 AM   Result Value Ref Range    Lactic acid 2.7 (HH) 0.4 - 2.0 MMOL/L   CBC WITH AUTOMATED DIFF    Collection Time: 11/30/21 10:13 AM   Result Value Ref Range    WBC 30.2 (H) 3.6 - 11.0 K/uL    RBC 7.27 (H) 3.80 - 5.20 M/uL    HGB 16.9 (H) 11.5 - 16.0 g/dL    HCT 55.6 (H) 35.0 - 47.0 %    MCV 76.5 (L) 80.0 - 99.0 FL    MCH 23.2 (L) 26.0 - 34.0 PG    MCHC 30.4 30.0 - 36.5 g/dL    RDW 22.4 (H) 11.5 - 14.5 %    PLATELET 446 458 - 971 K/uL    MPV ABNORMAL 8.9 - 12.9 FL    NRBC 2.3 (H) 0  WBC    ABSOLUTE NRBC 0.68 (H) 0.00 - 0.01 K/uL    NEUTROPHILS 89 (H) 32 - 75 %    LYMPHOCYTES 2 (L) 12 - 49 %    MONOCYTES 7 5 - 13 %    EOSINOPHILS 0 0 - 7 %    BASOPHILS 0 0 - 1 %    IMMATURE GRANULOCYTES 2 (H) 0.0 - 0.5 %    ABS. NEUTROPHILS 26.9 (H) 1.8 - 8.0 K/UL    ABS. LYMPHOCYTES 0.6 (L) 0.8 - 3.5 K/UL    ABS. MONOCYTES 2.1 (H) 0.0 - 1.0 K/UL    ABS. EOSINOPHILS 0.0 0.0 - 0.4 K/UL    ABS. BASOPHILS 0.0 0.0 - 0.1 K/UL    ABS. IMM. GRANS. 0.6 (H) 0.00 - 0.04 K/UL    DF SMEAR SCANNED      RBC COMMENTS ANISOCYTOSIS  2+       METABOLIC PANEL, COMPREHENSIVE    Collection Time: 11/30/21 10:13 AM   Result Value Ref Range    Sodium 143 136 - 145 mmol/L    Potassium 4.3 3.5 - 5.1 mmol/L    Chloride 111 (H) 97 - 108 mmol/L    CO2 22 21 - 32 mmol/L    Anion gap 10 5 - 15 mmol/L    Glucose 100 65 - 100 mg/dL    BUN 52 (H) 6 - 20 MG/DL    Creatinine 1.48 (H) 0.55 - 1.02 MG/DL    BUN/Creatinine ratio 35 (H) 12 - 20      GFR est AA 40 (L) >60 ml/min/1.73m2    GFR est non-AA 33 (L) >60 ml/min/1.73m2    Calcium 9.3 8.5 - 10.1 MG/DL    Bilirubin, total 1.1 (H) 0.2 - 1.0 MG/DL    ALT (SGPT) 31 12 - 78 U/L    AST (SGOT) 56 (H) 15 - 37 U/L    Alk.  phosphatase 98 45 - 117 U/L    Protein, total 7.0 6.4 - 8.2 g/dL    Albumin 2.6 (L) 3.5 - 5.0 g/dL    Globulin 4.4 (H) 2.0 - 4.0 g/dL    A-G Ratio 0.6 (L) 1.1 - 2.2     TSH 3RD GENERATION    Collection Time: 11/30/21 10:13 AM   Result Value Ref Range    TSH 1.10 0.36 - 3.74 uIU/mL   TROPONIN-HIGH SENSITIVITY    Collection Time: 11/30/21 10:13 AM   Result Value Ref Range    Troponin-High Sensitivity 472 (HH) 0 - 51 ng/L   PERIPHERAL SMEAR    Collection Time: 11/30/21 10:13 AM   Result Value Ref Range    PERIPHERAL SMEAR        Pathologic examination results can be viewed in [facility] Care Chart Review under the Pathology tab. ECHO ADULT COMPLETE    Collection Time: 11/30/21  3:30 PM   Result Value Ref Range    IVSd 0.91 (A) 0.6 - 0.9 cm    LVIDd 5.86 (A) 3.9 - 5.3 cm    LVIDs 5.28 cm    LVOT d 1.62 cm    LVPWd 0.71 0.6 - 0.9 cm    BP EF 27.3 (A) 55 - 100 percent    LV Ejection Fraction MOD 2C 28 percent    LV Ejection Fraction MOD 4C 27 percent    LV ED Vol A2C 154. 37 mL    LV ED Vol A4C 132.16 mL    LV ED Vol .38 (A) 56 - 104 mL    LV ES Vol A2C 111.22 mL    LV ES Vol A4C 96.60 mL    LV ES Vol .94 (A) 19 - 49 mL    LVOT Peak Gradient 0.95 mmHg    LVOT Peak Velocity 48.66 cm/s    RVIDd 3.10 cm    Left Atrium Major Axis 3.30 cm    LA Volume 29.57 22 - 52 mL    LA Area 4C 11.91 cm2    LA Vol 2C 29.54 22 - 52 mL    LA Vol 4C 20.71 (A) 22 - 52 mL    LA Volume DISK BP 27.21 22 - 52 mL    AV Annulus 3.07 cm    Aortic Valve Area by Continuity of Peak Velocity 0.85 cm2    AV R PG 28.17 mmHg    Aortic Regurgitant Pressure Half-time 1,045.71 ms    AR Max Juan Francisco 265.36 cm/s    AoV PG 5.62 mmHg    Aortic Valve Systolic Peak Velocity 049.40 cm/s    MV A Juan Francisco 66.36 cm/s    Mitral Valve E Wave Deceleration Time 87.61 ms    MV E Juan Francisco 122.33 cm/s    E/E' ratio (averaged) 23.33     E/E' lateral 21.84     E/E' septal 24.81     LV E' Lateral Velocity 5.60 cm/s    LV E' Septal Velocity 4.93 cm/s    TR Max Velocity 556.66 cm/s    Pulmonic Regurgitant End Max Velocity 110.91 cm/s    Pulmonic Valve Systolic Peak Instantaneous Gradient 2.10 mmHg    Pulmonic Valve Max Velocity 72.48 cm/s    Tapse 1.03 (A) 1.5 - 2.0 cm    Triscuspid Valve Regurgitation Peak Gradient 37.29 mmHg    TR Max Velocity 305.05 cm/s    AO ASC D 3.05 cm    IVC proximal 1.82 cm    MV E/A 1.84     LV Mass .4 67 - 162 g    LV Mass AL Index 123.4 43 - 95 g/m2    LVES Vol Index BP 71.4 mL/m2    LVED Vol Index BP 98.2 mL/m2    Left Atrium Minor Axis 2.24 cm    LA Vol Index 20.12 16 - 28 ml/m2    LA Vol Index 20.10 16 - 28 ml/m2    LA Vol Index 14.09 16 - 28 ml/m2    LVED Vol Index A4C 89.9 mL/m2    LVED Vol Index A2C 105.0 mL/m2    LVES Vol Index A4C 65.7 mL/m2    LVES Vol Index A2C 75.7 mL/m2    NARGIS/BSA Pk Juan Francisco 0.6 cm2/m2   LACTIC ACID    Collection Time: 11/30/21  4:27 PM   Result Value Ref Range    Lactic acid 2.7 (HH) 0.4 - 2.0 MMOL/L   VANCOMYCIN, RANDOM    Collection Time: 11/30/21  4:27 PM   Result Value Ref Range    Vancomycin, random 4.1 UG/ML   DUPLEX CAROTID BILATERAL    Collection Time: 11/30/21 11:09 PM   Result Value Ref Range    Right subclavian prox PSV 80.7 cm/s    Right subclavian prox EDV 0.0 cm/s    Right cca dist sys 28.6 cm/s    Right CCA dist bose 6.4 cm/s    Right CCA prox sys 32.5 cm/s    Right CCA prox bose 0.0 cm/s    Right eca sys 40.3 cm/s    RIGHT EXTERNAL CAROTID ARTERY D 3.78 cm/s    Right ICA dist sys 74.2 cm/s    Right ICA dist bose 2.5 cm/s    Right ICA mid sys 82.0 cm/s    Right ICA mid bose 7.7 cm/s    Right ICA prox sys 65.1 cm/s    Right ICA prox bose 10.3 cm/s    Right vertebral sys 30.5 cm/s    RIGHT VERTEBRAL ARTERY D 0.00 cm/s    Right ICA/CCA sys 2.9     Left subclavian prox PSV 96.4 cm/s    Left subclavian prox EDV 0.0 cm/s    Left CCA dist sys 48.8 cm/s    Left CCA dist obse 6.0 cm/s    Left CCA prox sys 36.1 cm/s    Left CCA prox bose 0.0 cm/s    Left ECA sys 66.2 cm/s    LEFT EXTERNAL CAROTID ARTERY D 4.27 cm/s    Left ICA dist sys 52.9 cm/s    Left ICA dist bose 5.1 cm/s    Left ICA mid sys 47.8 cm/s    Left ICA mid bose 7.6 cm/s    Left ICA prox sys 80.1 cm/s    Left ICA prox bose 12.8 cm/s    Left vertebral sys 41.3 cm/s    LEFT VERTEBRAL ARTERY D 6.34 cm/s    Left ICA/CCA sys 1.64    CBC WITH AUTOMATED DIFF    Collection Time: 12/01/21  1:03 AM   Result Value Ref Range    WBC 34. 3 (H) 3.6 - 11.0 K/uL    RBC 6.62 (H) 3.80 - 5.20 M/uL    HGB 15.3 11.5 - 16.0 g/dL    HCT 50.9 (H) 35.0 - 47.0 %    MCV 76.9 (L) 80.0 - 99.0 FL    MCH 23.1 (L) 26.0 - 34.0 PG    MCHC 30.1 30.0 - 36.5 g/dL    RDW 21.5 (H) 11.5 - 14.5 %    PLATELET 583 047 - 802 K/uL    NRBC 3.2 (H) 0  WBC    ABSOLUTE NRBC 1.08 (H) 0.00 - 0.01 K/uL    NEUTROPHILS 89 (H) 32 - 75 %    BAND NEUTROPHILS 1 0 - 6 %    LYMPHOCYTES 2 (L) 12 - 49 %    MONOCYTES 8 5 - 13 %    EOSINOPHILS 0 0 - 7 %    BASOPHILS 0 0 - 1 %    IMMATURE GRANULOCYTES 0 %    ABS. NEUTROPHILS 30.9 (H) 1.8 - 8.0 K/UL    ABS. LYMPHOCYTES 0.7 (L) 0.8 - 3.5 K/UL    ABS. MONOCYTES 2.7 (H) 0.0 - 1.0 K/UL    ABS. EOSINOPHILS 0.0 0.0 - 0.4 K/UL    ABS. BASOPHILS 0.0 0.0 - 0.1 K/UL    ABS. IMM. GRANS. 0.0 K/UL    DF MANUAL      RBC COMMENTS OVALOCYTES  GREER CELLS  ANISOCYTOSIS  1+        RBC COMMENTS MICROCYTOSIS  1+       METABOLIC PANEL, BASIC    Collection Time: 12/01/21  1:03 AM   Result Value Ref Range    Sodium 147 (H) 136 - 145 mmol/L    Potassium 4.3 3.5 - 5.1 mmol/L    Chloride 115 (H) 97 - 108 mmol/L    CO2 22 21 - 32 mmol/L    Anion gap 10 5 - 15 mmol/L    Glucose 112 (H) 65 - 100 mg/dL    BUN 52 (H) 6 - 20 MG/DL    Creatinine 1.31 (H) 0.55 - 1.02 MG/DL    BUN/Creatinine ratio 40 (H) 12 - 20      GFR est AA 46 (L) >60 ml/min/1.73m2    GFR est non-AA 38 (L) >60 ml/min/1.73m2    Calcium 8.0 (L) 8.5 - 10.1 MG/DL   LACTIC ACID    Collection Time: 12/01/21  1:03 AM   Result Value Ref Range    Lactic acid 2.3 (HH) 0.4 - 2.0 MMOL/L     NEUROLOGICAL EXAM:     Appearance: The patient is thin and is in no acute distress. Mental Status: Oriented to person. Fluent with no aphasia but dysarthric. Intermittently follows commands. Talks nonsensically. Cranial Nerves:   Intact visual fields. BELKIS, EOM's full, no nystagmus, no ptosis. Facial sensation is normal. Corneal reflexes are intact. Right facial droop. Hearing is normal bilaterally.  Palate is midline with normal elevation. Sternocleidomastoid and trapezius muscles are normal. Tongue is midline. Motor:  Generalized weakness but LE>>UE. No focal weakness. Normal tone. No pronator drift. Reflexes:   Deep tendon reflexes 2+/4 and symmetrical. Downgoing toes. Sensory:   Normal to cold and noxious. Gait:  Not tested. Tremor:   No tremor noted. Cerebellar:  Unable to do. Assessment:   Acute CVA  Acute metabolic encephalopathy  Hyperlipidemia    Plan:   Neurological examination reveals cognitive impairment, dysarthria, right facial droop, generalized weakness lower extremity greater than upper extremity. Status post punctate acute infarction in cerebellar vermis, left occipital lobe and left parietal lobe. Also elements of encephalopathy.     Head CT without contrast did not reveal any acute process. Brain MRI without contrast revealed several small foci of acute infarction in the cerebellar vermis, left occipital lobe and left parietal lobe. Patchy chronic white matter disease. Small chronic infarctions left cerebellum.     Carotid Doppler revealed 50 to 69% right ICA stenosis and 0 to 49% left ICA stenosis. No intervention necessary at this time. Echocardiogram revealed low EF of 15 to 20% with severe left ventricular diastolic dysfunction and severe aortic valve sclerosis. No shunting.     EEG done revealed moderate generalized slowing is seen in encephalopathies. No seizures.     LDL was elevated at 100. It should be less than 70. Continue atorvastatin 10 mg daily.     Continue aspirin 325 mg daily.     Hemoglobin A1c was slightly elevated at 5.7. Dietary changes.      Correct underlying medical issues.     Continue speech, PT and OT. Patient likely either an inpatient or skilled nursing facility candidate.     Spoke with patient's brother that given her medical issues and ongoing neurological problems the patient will not be able to be back home alone.   Ideally should be in a nursing facility.         Signed:  Virginia Headley MD  12/1/2021  8:16 AM

## 2021-12-01 NOTE — PROGRESS NOTES
Care Management follow up    Patient admitted for Altered mental status, slurred speech, acute CVA, EF 15-20%. Hx Cardiomyopathy with EF 30%, HTN, CAD.     COVID19 Vaccine:  no       RUR 15 (Score %) moderate   Is This a Readmission NO  Is this a Bundle NO    Current status  Patient discussed during interdisciplinary rounds. Patient continues to require medical management including IV antibiotics. Further diagnostics including whole body tagged WBC  Nuclear study tomorrow. Cardiology, neurology, ID, palliative following. Await PT/OT evaluations, likely will need SNF placement. Provider list left with patient at bedside. Transition of Care Plan  1. Monitor patient status and response to treatment. 2. Patient continues to require medical management. 3. Will likely need SNF placement, will follow up with patient/family to obtain choices. 4. Await further diagnostics. 5. CM to monitor progress and recommendations.     Margarette Dickerson, RN, MSN/Care manager

## 2021-12-01 NOTE — PROGRESS NOTES
Problem: Mobility Impaired (Adult and Pediatric)  Goal: *Acute Goals and Plan of Care (Insert Text)  Description: FUNCTIONAL STATUS PRIOR TO ADMISSION: Patient was independent and active without use of DME.    HOME SUPPORT PRIOR TO ADMISSION: The patient lived alone with brother to provide assistance. Physical Therapy Goals  Initiated 11/30/2021  1. Patient will move from supine to sit and sit to supine , scoot up and down, and roll side to side in bed with minimal assistance/contact guard assist within 7 day(s). 2.  Patient will transfer from bed to chair and chair to bed with minimal assistance/contact guard assist using the least restrictive device within 7 day(s). 3.  Patient will perform sit to stand with minimal assistance/contact guard assist within 7 day(s). 4.  Patient will ambulate with minimal assistance/contact guard assist for 100 feet with the least restrictive device within 7 day(s). Outcome: Progressing Towards Goal   PHYSICAL THERAPY TREATMENT  Patient: Mckenzie Hanson (44 y.o. female)  Date: 12/1/2021  Diagnosis: Altered mental state [R41.82]  Elevated troponin [R77.8]  Dysarthria [R47.1]   Acute CVA (cerebrovascular accident) [facility])       Precautions:  fall  Chart, physical therapy assessment, plan of care and goals were reviewed. ASSESSMENT  Patient continues with skilled PT services and is progressing towards goals. Communicated with nurse cleared for therapy. Patient supine on bed when received. Performed some active range of motion exercise on both LE all planes while on bed rolled on the edge of bed mod assist x 2, supine to sit max assist x 2, tolerated sitting balance better than yesterday patient able to maintain sitting balance without support at times. Sit to stand max assist x 2, ambulate lateral side steps towards the head of bed handheld assist. Assisted supine on bed and reposition patient up high on bed. Place bed to modified chair position and activated bed alarm. Nurse in the room and agreed to monitor patient. Current Level of Function Impacting Discharge (mobility/balance): max assist x 2 with transfers and ambulation hand held assist    Other factors to consider for discharge: fall         PLAN :  Patient continues to benefit from skilled intervention to address the above impairments. Continue treatment per established plan of care. to address goals. Recommendation for discharge: (in order for the patient to meet his/her long term goals)  Therapy up to 5 days/week in SNF setting    This discharge recommendation:  Has been made in collaboration with the attending provider and/or case management    IF patient discharges home will need the following DME: to be determined (TBD)       SUBJECTIVE:   Patient stated ok.     OBJECTIVE DATA SUMMARY:   Critical Behavior:  Neurologic State: Alert  Orientation Level: Oriented to person, Oriented to place, Disoriented to time, Disoriented to situation  Cognition: Follows commands  Safety/Judgement: Decreased insight into deficits  Functional Mobility Training:  Bed Mobility:  Rolling: Moderate assistance; Assist x2  Supine to Sit: Moderate assistance; Assist x2  Sit to Supine: Moderate assistance; Assist x2  Scooting: Moderate assistance; Assist x2        Transfers:  Sit to Stand: Maximum assistance; Assist x2  Stand to Sit: Maximum assistance; Assist x2  Stand Pivot Transfers: Maximum assistance; Assist x2                          Balance:  Sitting: Impaired; High guard  Sitting - Static: Fair (occasional)  Sitting - Dynamic: Fair (occasional)  Standing: Impaired; Pull to stand; With support  Standing - Static: Poor  Standing - Dynamic : Poor  Ambulation/Gait Training:  Distance (ft): 3 Feet (ft)  Assistive Device: Gait belt (hand held assist)  Ambulation - Level of Assistance: Maximum assistance; Assist x2     Gait Description (WDL): Exceptions to WDL  Gait Abnormalities: Path deviations;  Step to gait        Base of Support: Narrowed     Speed/Melia: Fluctuations; Slow  Step Length: Right shortened; Left shortened                      Therapeutic Exercises:   Educate and instructed patient to continue active range of motion exercise on both legs while up on chair or on bed multiple times. Pain Ratin/10    Activity Tolerance:   Good    After treatment patient left in no apparent distress:   Supine in bed, Heels elevated for pressure relief, Call bell within reach, Bed / chair alarm activated, and Side rails x 3    COMMUNICATION/COLLABORATION:   The patients plan of care was discussed with: Occupational therapist and Registered nurse. Ruth Hebert, PT,WCC.    Time Calculation: 24 mins

## 2021-12-01 NOTE — CONSULTS
Infectious Disease Consult    Impression/Plan   · Leukocytosis. Etiology unclear. Remains afebrile since the time of admission. WBC increasing despite broad-spectrum antibiotics. Blood culture NGSF and UA bland. CT head, C-A-P negative for acute infectious process. No diarrhea to suggest C. difficile. No significant wounds. Not on steroids. Will check whole-body tagged white blood cell count for completeness. .  Repeat blood cultures today. Stop vancomycin. Add metronidazole. Repeat CBC in a.m. . 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 With no fevers and sterile blood cultures low suspicion for vegetation. No TUNDE planned. Discussed with cardiology  · Acute CVA. Neurology following  · Ischemic cardiomyopathy      Anti-infectives:   1. Vancomycin  2. Cefepime    Subjective:   Date of Consultation:  December 1, 2021  Date of Admission: 11/29/2021   Referring Physician:     Please note patient is a very poor historian. Per HPI  \"Ms. Maria Fernanda Holder is a 80 y.o. female who is admitted with altered mentation and slurred speech. EMS was called by someone who came to her house to check on her so we do not know her last known normal. History obtained primarily from chart. Patient's only complaint is weakness but she says that has been an ongoing issue. She states \"this happens at times\" but does not elaborate much on what she means despite probing\"    Work-up at this admission has revealed an acute CVA. Neurology is following. Hospital course has been complicated by progressive leukocytosis. Work-up for infectious etiology has been unrevealing to date. She is currently on vancomycin and cefepime.   Infectious diseases service has been asked to assist with antibiotic management    Patient Active Problem List   Diagnosis Code    Acute systolic heart failure ([facility] [address] I50.21    Unspecified hypertensive heart disease with heart failure(402.91) I11.0    Coronary atherosclerosis of native coronary artery I25.10    Anxiety state, unspecified F41.1    Hypothyroidism E03.9    Elevated troponin R77.8    Dysarthria R47.1    Acute CVA (cerebrovascular accident) ([facility] [address] I63.9    Ischemic cardiomyopathy I25.5    Acute metabolic encephalopathy Z87.06    Leukocytosis D72.829    Underweight R63.6     Past Medical History:   Diagnosis Date    Acute MI ([facility] [address]     Anxiety     CAD (coronary artery disease)     Hypertension       Family History   Problem Relation Age of Onset    Other Unknown         unknown      Social History     Tobacco Use    Smoking status: Never Smoker    Smokeless tobacco: Not on file   Substance Use Topics    Alcohol use: No     Past Surgical History:   Procedure Laterality Date    CARDIAC SURG PROCEDURE UNLIST      HX GYN      hysterectomy    HX ORTHOPAEDIC        Prior to Admission medications    Medication Sig Start Date End Date Taking? Authorizing Provider   metoprolol succinate (TOPROL-XL) 25 mg XL tablet Take 12.5 mg by mouth daily. Yes Provider, Historical   levothyroxine (SYNTHROID) 100 mcg tablet Take 100 mcg by mouth Daily (before breakfast). Yes Provider, Historical   aspirin delayed-release 81 mg tablet Take 81 mg by mouth daily. Yes Provider, Historical   POTASSIUM CHLORIDE SR 10 MEQ TAB Take 10 mEq by mouth daily. Yes Provider, Historical   Ramipril 5 mg Tab Take 5 mg by mouth daily. Yes Provider, Historical     No Known Allergies     Review of Systems:  Review of systems not obtained due to patient factors. Objective:   Blood pressure (!) 140/82, pulse 88, temperature 97.7 °F (36.5 °C), resp. rate 24, height 5' 4\" (1.626 m), weight 102 lb 1.2 oz (46.3 kg), SpO2 97 %.   Temp (24hrs), Av.9 °F (36.6 °C), Min:97.5 °F (36.4 °C), Max:98.2 °F (36.8 °C)       Exam:    General:  Alert, speech difficult to understand due to dysarthria   Eyes:  Sclera anicteric   Mouth/Throat: Mucous membranes normal   Neck: Supple   Lungs:   Clear to auscultation anteriorly   CV:  Regular rate and rhythm   Abdomen:   Soft, non-tender, non-distended   Extremities:  No edema   Skin:  No rash   Lymph nodes:    Musculoskeletal:  Moves all   Lines/Devices:   Peripheral   Psych:  Pleasantly confused       Data Review:   Recent Results (from the past 24 hour(s))   ECHO ADULT COMPLETE    Collection Time: 11/30/21  3:30 PM   Result Value Ref Range    IVSd 0.91 (A) 0.6 - 0.9 cm    LVIDd 5.86 (A) 3.9 - 5.3 cm    LVIDs 5.28 cm    LVOT d 1.62 cm    LVPWd 0.71 0.6 - 0.9 cm    BP EF 27.3 (A) 55 - 100 percent    LV Ejection Fraction MOD 2C 28 percent    LV Ejection Fraction MOD 4C 27 percent    LV ED Vol A2C 154. 37 mL    LV ED Vol A4C 132.16 mL    LV ED Vol .38 (A) 56 - 104 mL    LV ES Vol A2C 111.22 mL    LV ES Vol A4C 96.60 mL    LV ES Vol .94 (A) 19 - 49 mL    LVOT Peak Gradient 0.95 mmHg    LVOT Peak Velocity 48.66 cm/s    RVIDd 3.10 cm    Left Atrium Major Axis 3.30 cm    LA Volume 29.57 22 - 52 mL    LA Area 4C 11.91 cm2    LA Vol 2C 29.54 22 - 52 mL    LA Vol 4C 20.71 (A) 22 - 52 mL    LA Volume DISK BP 27.21 22 - 52 mL    AV Annulus 3.07 cm    Aortic Valve Area by Continuity of Peak Velocity 0.85 cm2    AV R PG 28.17 mmHg    Aortic Regurgitant Pressure Half-time 1,045.71 ms    AR Max Juan Francisco 265.36 cm/s    AoV PG 5.62 mmHg    Aortic Valve Systolic Peak Velocity 489.70 cm/s    MV A Juan Francisco 66.36 cm/s    Mitral Valve E Wave Deceleration Time 87.61 ms    MV E Juan Francisco 122.33 cm/s    E/E' ratio (averaged) 23.33     E/E' lateral 21.84     E/E' septal 24.81     LV E' Lateral Velocity 5.60 cm/s    LV E' Septal Velocity 4.93 cm/s    TR Max Velocity 556.66 cm/s    Pulmonic Regurgitant End Max Velocity 110.91 cm/s    Pulmonic Valve Systolic Peak Instantaneous Gradient 2.10 mmHg    Pulmonic Valve Max Velocity 72.48 cm/s    Tapse 1.03 (A) 1.5 - 2.0 cm    Triscuspid Valve Regurgitation Peak Gradient 37.29 mmHg    TR Max Velocity 305.05 cm/s AO ASC D 3.05 cm    IVC proximal 1.82 cm    MV E/A 1.84     LV Mass .4 67 - 162 g    LV Mass AL Index 123.4 43 - 95 g/m2    LVES Vol Index BP 71.4 mL/m2    LVED Vol Index BP 98.2 mL/m2    Left Atrium Minor Axis 2.24 cm    LA Vol Index 20.12 16 - 28 ml/m2    LA Vol Index 20.10 16 - 28 ml/m2    LA Vol Index 14.09 16 - 28 ml/m2    LVED Vol Index A4C 89.9 mL/m2    LVED Vol Index A2C 105.0 mL/m2    LVES Vol Index A4C 65.7 mL/m2    LVES Vol Index A2C 75.7 mL/m2    NARGIS/BSA Pk Juan Francisco 0.6 cm2/m2   LACTIC ACID    Collection Time: 11/30/21  4:27 PM   Result Value Ref Range    Lactic acid 2.7 (HH) 0.4 - 2.0 MMOL/L   VANCOMYCIN, RANDOM    Collection Time: 11/30/21  4:27 PM   Result Value Ref Range    Vancomycin, random 4.1 UG/ML   DUPLEX CAROTID BILATERAL    Collection Time: 11/30/21 11:09 PM   Result Value Ref Range    Right subclavian prox PSV 80.7 cm/s    Right subclavian prox EDV 0.0 cm/s    Right cca dist sys 28.6 cm/s    Right CCA dist bose 6.4 cm/s    Right CCA prox sys 32.5 cm/s    Right CCA prox bose 0.0 cm/s    Right eca sys 40.3 cm/s    RIGHT EXTERNAL CAROTID ARTERY D 3.78 cm/s    Right ICA dist sys 74.2 cm/s    Right ICA dist bose 2.5 cm/s    Right ICA mid sys 82.0 cm/s    Right ICA mid bose 7.7 cm/s    Right ICA prox sys 65.1 cm/s    Right ICA prox bose 10.3 cm/s    Right vertebral sys 30.5 cm/s    RIGHT VERTEBRAL ARTERY D 0.00 cm/s    Right ICA/CCA sys 2.9     Left subclavian prox PSV 96.4 cm/s    Left subclavian prox EDV 0.0 cm/s    Left CCA dist sys 48.8 cm/s    Left CCA dist bose 6.0 cm/s    Left CCA prox sys 36.1 cm/s    Left CCA prox bose 0.0 cm/s    Left ECA sys 66.2 cm/s    LEFT EXTERNAL CAROTID ARTERY D 4.27 cm/s    Left ICA dist sys 52.9 cm/s    Left ICA dist bose 5.1 cm/s    Left ICA mid sys 47.8 cm/s    Left ICA mid bose 7.6 cm/s    Left ICA prox sys 80.1 cm/s    Left ICA prox bose 12.8 cm/s    Left vertebral sys 41.3 cm/s    LEFT VERTEBRAL ARTERY D 6.34 cm/s    Left ICA/CCA sys 1.64    CBC WITH AUTOMATED DIFF    Collection Time: 12/01/21  1:03 AM   Result Value Ref Range    WBC 34.3 (H) 3.6 - 11.0 K/uL    RBC 6.62 (H) 3.80 - 5.20 M/uL    HGB 15.3 11.5 - 16.0 g/dL    HCT 50.9 (H) 35.0 - 47.0 %    MCV 76.9 (L) 80.0 - 99.0 FL    MCH 23.1 (L) 26.0 - 34.0 PG    MCHC 30.1 30.0 - 36.5 g/dL    RDW 21.5 (H) 11.5 - 14.5 %    PLATELET 555 136 - 035 K/uL    NRBC 3.2 (H) 0  WBC    ABSOLUTE NRBC 1.08 (H) 0.00 - 0.01 K/uL    NEUTROPHILS 89 (H) 32 - 75 %    BAND NEUTROPHILS 1 0 - 6 %    LYMPHOCYTES 2 (L) 12 - 49 %    MONOCYTES 8 5 - 13 %    EOSINOPHILS 0 0 - 7 %    BASOPHILS 0 0 - 1 %    IMMATURE GRANULOCYTES 0 %    ABS. NEUTROPHILS 30.9 (H) 1.8 - 8.0 K/UL    ABS. LYMPHOCYTES 0.7 (L) 0.8 - 3.5 K/UL    ABS. MONOCYTES 2.7 (H) 0.0 - 1.0 K/UL    ABS. EOSINOPHILS 0.0 0.0 - 0.4 K/UL    ABS. BASOPHILS 0.0 0.0 - 0.1 K/UL    ABS. IMM.  GRANS. 0.0 K/UL    DF MANUAL      RBC COMMENTS OVALOCYTES  GREER CELLS  ANISOCYTOSIS  1+        RBC COMMENTS MICROCYTOSIS  1+       METABOLIC PANEL, BASIC    Collection Time: 12/01/21  1:03 AM   Result Value Ref Range    Sodium 147 (H) 136 - 145 mmol/L    Potassium 4.3 3.5 - 5.1 mmol/L    Chloride 115 (H) 97 - 108 mmol/L    CO2 22 21 - 32 mmol/L    Anion gap 10 5 - 15 mmol/L    Glucose 112 (H) 65 - 100 mg/dL    BUN 52 (H) 6 - 20 MG/DL    Creatinine 1.31 (H) 0.55 - 1.02 MG/DL    BUN/Creatinine ratio 40 (H) 12 - 20      GFR est AA 46 (L) >60 ml/min/1.73m2    GFR est non-AA 38 (L) >60 ml/min/1.73m2    Calcium 8.0 (L) 8.5 - 10.1 MG/DL   LACTIC ACID    Collection Time: 12/01/21  1:03 AM   Result Value Ref Range    Lactic acid 2.3 (HH) 0.4 - 2.0 MMOL/L        Microbiology:      Studies:      Signed By: Hoa Ayala DO     December 1, 2021

## 2021-12-01 NOTE — PROGRESS NOTES
Md Lizzie Chavez made aware of pts hr. It  jumped from 110-160 for a 2sec then back to 110 .  Patient is resting comfortably

## 2021-12-01 NOTE — PROGRESS NOTES
Roderick Adkins yarely [address] [address]  [address] [facility] [place], [address] [place] [address]  [phone]      Medical Progress Note      NAME:         Terrell Smoker   :        1928  MRM:        [id number]    Date of service: 2021      Subjective: Patient has been seen and examined as a follow up for multiple medical issues. Chart, labs, diagnostics reviewed. She remains confused and weak this morning. I have discussed with her nurse. She remains afebrile. No new symptoms. Objective:    Vital Signs:    Visit Vitals  BP (!) 140/82 (BP 1 Location: Right upper arm, BP Patient Position: At rest)   Pulse 88   Temp 97.7 °F (36.5 °C)   Resp 24   Ht 5' 4\" (1.626 m)   Wt 46.3 kg (102 lb 1.2 oz)   SpO2 97%   BMI 17.52 kg/m²          Intake/Output Summary (Last 24 hours) at 2021 9277  Last data filed at 2021 0017  Gross per 24 hour   Intake 1540 ml   Output 350 ml   Net 1190 ml        Physical Examination:    General:   Weak, cachectic and frail looking, not in acute distress    Eyes:   pink conjunctivae, PERRLA with no discharge. ENT:   no ottorrhea or rhinorrhea with dry mucous membranes  Neck: no masses, thyroid non-tender and trachea central.  Pulm:  no accessory muscle use, decreased but clear breath sounds without crackles or wheezes  Card:  no JVD or murmurs, has regular and normal S1, S2 without thrills, bruits or peripheral edema  Abd:  Soft, non-tender, non-distended, normoactive bowel sounds   Musc:  No cyanosis, clubbing, atrophy or deformities. Skin:  No rashes, bruising. Redness over sacral bony prominence. No overt ulcers. Neuro: Awake and alert but confused.  Generally a non focal exam. Limited exam.   Psych:  Has little insight to her illness at this time     Current Facility-Administered Medications   Medication Dose Route Frequency    cefepime (MAXIPIME) 2 g in sterile water (preservative free) 10 mL IV syringe  2 g IntraVENous Q24H    Vancomycin - Pharmacy to dose by level   Other Rx Dosing/Monitoring    atorvastatin (LIPITOR) tablet 10 mg  10 mg Oral DAILY    acetaminophen (TYLENOL) tablet 650 mg  650 mg Oral Q4H PRN    Or    acetaminophen (TYLENOL) solution 650 mg  650 mg Per NG tube Q4H PRN    Or    acetaminophen (TYLENOL) suppository 650 mg  650 mg Rectal Q4H PRN    aspirin tablet 325 mg  325 mg Oral DAILY    heparin (porcine) injection 5,000 Units  5,000 Units SubCUTAneous Q8H        Laboratory data and review:    Recent Labs     12/01/21  0103 11/30/21  1013 11/29/21  1245   WBC 34.3* 30.2* 26.2*   HGB 15.3 16.9* 16.8*   HCT 50.9* 55.6* 55.2*    391 432*     Recent Labs     12/01/21  0103 11/30/21  1013 11/29/21  1245   * 143 141   K 4.3 4.3 4.5   * 111* 104   CO2 22 22 26   * 100 153*   BUN 52* 52* 53*   CREA 1.31* 1.48* 1.91*   CA 8.0* 9.3 9.6   ALB  --  2.6* 2.9*   ALT  --  31 27     No components found for: [place]    Diagnostics: Imaging studies have been reviewed    Telemetry reviewed by me:   normal sinus rhythm    Assessment and Plan:    Acute CVA (cerebrovascular accident) ([facility] [address] (11/30/2021) / Dysarthria (11/29/2021) POA: MRi brain showed several small foci of acute infarction in the cerebellar vermis, left occipital lobe, and left parietal lobe. Doppler carotids pending. , A1c 5.7. Echo showed severe aortic sclerosis, EF 15-20%. No shunt. Seen by neurology. Continue Asprin, Lipitor, PT, OT and speech therapies. CM for discharge planning. Given her advanced age and persistent confusion, debility, I am concerned that she is at great risk for worsening. Palliative care following. Leukocytosis (11/30/2021) POA: unclear as to why she has a now progressively worsening leukocytosis. She has no fever. CT scans chest, abdomen and pelvis unremarkable for any obvious focus of infection. No obvious wounds. Blood cultures neg. Peripheral smear shows a reactive picture.  Lactate trending down. Given concern for an occult infection, will continue empiric IV Cefepime and Vancomycin. No IV fluids given her low EF. Consult ID    Coronary atherosclerosis of native coronary artery / Elevated troponin (11/29/2021) /  Ischemic cardiomyopathy (11/30/2021) POA: Hx CAD MI 12/26/12- [place]. Now with a mild troponin elevation. No overt ischemia. Seen by cardiology. Echo showed a reduced EF of 15-20% with valvular disease. Continue Asprin, Lipitor. Acute metabolic encephalopathy (10/33/7935) POA: likely due to the CVA vs suspected occult infection vs underlying dementia. Continue to monitor her clinical progress     Hypothyroidism (4/17/2013) POA: TSH is normal. Unclear if she is taking any medications. Monitor for now     Underweight (11/30/2021) POA: due to advanced age, likely poor intake.  Diet as tolerated when able    Total time spent for the patient's care: [facility] discussed with: Patient, Care Manager and Nursing Staff    Discussed:  Care Plan and D/C Planning    Prophylaxis:  Hep SQ    Anticipated Disposition:  SNF/LTC vs TBD           ___________________________________________________    Attending Physician:   Sara Dubon MD

## 2021-12-01 NOTE — PROGRESS NOTES
Comprehensive Nutrition Assessment    Type and Reason for Visit: Reassess    Nutrition Recommendations/Plan:   1. Continue pureed diet per SLP  2. Provide Magic Cup once daily to increase kcal/protein intake (290 kcal, 38 g carbs, 9 g protein)  3. Provide Ensure Enlive BID to increase kcal/protein intake (700 kcal, 88 g Carbs, 40 g protein)    Nutrition Assessment:    12/1: Follow up. Able to meet with patient and brother today. Brother states patient usually drinks 1 - 2 Boosts or Ensures per day, and usually consumes only cookies. Patient appears more alert today. Patient stated she liked her ice cream yesterday (Magic Cup). NFPE completed at this encounter. Brother states patient has not recently lost weight, but is unsure of UBW. Denies previous chewing/swallowing problems but patient is edentulous. 11/30: Pt is a 80year old female admitted with Altered mental state [R41.82]  Elevated troponin [R77.8]  Dysarthria [R47.1]. She  has a past medical history of Acute MI ([address] Anxiety, CAD (coronary artery disease), and Hypertension. RD consulted for poor appetite and wounds. Off floor at time of RD visit. SLP recommended puree diet with thin liquids - patient oriented to person only per notes. Unable to contact DEVYNK. POORNIMA to follow up at a later date in attempt to gain further hx. No noted N/V/D at this time. NKFA.     Wt Readings from Last 10 Encounters:   11/30/21 46.3 kg (102 lb 1.2 oz)   04/18/13 55.3 kg (122 lb)     Patient Vitals for the past 168 hrs:   % Diet Eaten   12/01/21 0852 1 - 25%   11/30/21 2145 0%   11/30/21 1817 0%   11/30/21 1444 1 - 25%   11/30/21 1047 1 - 25%     Last 3 Recorded Weights in this Encounter    11/29/21 1305 11/30/21 1441   Weight: 46.3 kg (102 lb) 46.3 kg (102 lb 1.2 oz)       Malnutrition Assessment:  Malnutrition Status:  Severe malnutrition    Context:  Chronic illness     Findings of the 6 clinical characteristics of malnutrition:   Energy Intake:  No significant decrease in energy intake  Weight Loss:  Unable to assess     Body Fat Loss:  7 - Severe body fat loss, Orbital, Triceps, Fat overlying ribs, Buccal region   Muscle Mass Loss:  7 - Severe muscle mass loss, Clavicles (pectoralis &deltoids), Calf (gastrocnemius), Thigh (quadriceps), Scapula (trapezius)  Fluid Accumulation:  No significant fluid accumulation,     Strength:  Not performed     Estimated Daily Nutrient Needs:  Energy (kcal): 1321-8051 (25-30); Weight Used for Energy Requirements: Current  Protein (g): 46-55 (1.0-1.2); Weight Used for Protein Requirements: Current  Fluid (ml/day): 7921-1391; Method Used for Fluid Requirements: 1 ml/kcal    Nutrition Related Findings:       ABD:  Flat, poor appetite, with active bowel sounds 12/1  Last BM: None noted 12/1  Edema: None noted 12/1    Nutr. Labs:  Lab Results   Component Value Date/Time    GFR est AA 46 (L) 12/01/2021 01:03 AM    GFR est non-AA 38 (L) 12/01/2021 01:03 AM    Creatinine 1.31 (H) 12/01/2021 01:03 AM    BUN 52 (H) 12/01/2021 01:03 AM    Sodium 147 (H) 12/01/2021 01:03 AM    Potassium 4.3 12/01/2021 01:03 AM    Chloride 115 (H) 12/01/2021 01:03 AM    CO2 22 12/01/2021 01:03 AM     Lab Results   Component Value Date/Time    Glucose 112 (H) 12/01/2021 01:03 AM     Lab Results   Component Value Date/Time    Hemoglobin A1c 5.7 (H) 11/30/2021 02:23 AM       Nutr. Meds:  Lipitor  Vancomycin    Wounds:    Per wound care note - Stage 1 POA inner right buttocks      Current Nutrition Therapies:  ADULT DIET Dysphagia - Pureed  ADULT ORAL NUTRITION SUPPLEMENT Lunch; Frozen Supplement  ADULT ORAL NUTRITION SUPPLEMENT Dinner; Standard High Calorie/High Protein    Anthropometric Measures:  · Height:  5' 4.02\" (162.6 cm)  · Current Body Wt:  46.3 kg (102 lb 1.2 oz)   · Ideal Body Wt:  120 lbs:  85.1 %   Body mass index is 17.51 kg/m².   · BMI Category:  Underweight (BMI less than 22) age over 72       Nutrition Diagnosis:   · Severe malnutrition related to inadequate protein-energy intake as evidenced by BMI, wounds, severe loss of subcutaneous fat, severe muscle loss    Nutrition Interventions:   Food and/or Nutrient Delivery: Continue current diet, Modify oral nutrition supplement  Nutrition Education and Counseling: No recommendations at this time  Coordination of Nutrition Care: Continue to monitor while inpatient, Interdisciplinary rounds    Goals:  PO intake >/= 80% estimated protein needs within 2 - 3 days       Nutrition Monitoring and Evaluation:   Behavioral-Environmental Outcomes: None identified  Food/Nutrient Intake Outcomes: Food and nutrient intake, Supplement intake  Physical Signs/Symptoms Outcomes: Biochemical data, Skin, Weight    Discharge Planning:     Too soon to determine     Electronically signed by Kimi Ireland RD on 55/9/2477  Contact: [phone] or via [employer]

## 2021-12-02 NOTE — PROGRESS NOTES
Deangelo Fiore MD, [address] [place] [address]  [address]  [phone]      IMPRESSION and RECOMMENDATIONS     1. Isch CM:  Minimal abnormal troponin, but no overt signs of ischemia. Echo now shows severe LV dysfunction (EF 15-20%). Typically, I would suggest BB/ACEI/ARB, but given her advanced age and at least some degree of dementia/disorientation, I don't see and overwhelming benefit to this. Cont ASA, statin. No anticoagulation at this time. 2.  AV mass:  \"consistent with papillary fibroelastoma\". Despite leukocytosis, would not pursue TUNDE unless BCx reveal typical organism. No other recs at this time, but let me know if issues arise. Subjective:       No complaints. Objective:     Patient Vitals for the past 16 hrs:   BP Temp Pulse Resp SpO2 Weight   12/02/21 0732 126/80 98 °F (36.7 °C) 73 20 93 %    12/02/21 0705   78      12/02/21 0319 127/83 97.3 °F (36.3 °C) 82 20 93 % 48.6 kg (107 lb 2.3 oz)   12/01/21 2305 125/70 97.4 °F (36.3 °C) 73 22 94 %    12/01/21 2242   73      12/01/21 1944 116/68 97.4 °F (36.3 °C) 83 17 92 %        HEENT Exam:     WNL         Lung Exam:     The patient is not dyspneic. Breath sounds are heard equally in all lung fields. There are no wheezes, rales, rhonchi, or rubs heard on auscultation. Heart Exam:     The rhythm is regular with ectopy. The PMI is in the 5th intercostal space of the MCL. Apical impulse is normal. S1 is regular. S2 is physiologic. There is no S3, S4 gallop, murmur, click, or rub. Abdomen Exam:     Benign. Extremities Exam:     No cyanosis, clubbing, edema. Distal pulses intact.            Lab Results   Component Value Date/Time    Glucose 90 12/02/2021 03:40 AM    Sodium 142 12/02/2021 03:40 AM    Potassium 4.7 12/02/2021 03:40 AM    Chloride 112 (H) 12/02/2021 03:40 AM    CO2 20 (L) 12/02/2021 03:40 AM    BUN 49 (H) 12/02/2021 03:40 AM    Creatinine 1.26 (H) 12/02/2021 03:40 AM    Calcium 8.6 12/02/2021 03:40 AM     Recent Labs     12/02/21  0340 12/01/21  0103   WBC 34.2* 34.3*   HGB 15.2 15.3   HCT 50.5* 50.9*    364     Recent Labs     11/30/21  1013 11/29/21  1245   ALT 31 27   AP 98 95   TBILI 1.1* 1.3*   TP 7.0 7.2   ALB 2.6* 2.9*   GLOB 4.4* 4.3*     No results for input(s): INR, PTP, APTT, INREXT, INREXT in the last 72 hours. Recent Labs     11/29/21  1245        No results for input(s): TROIQ in the last 72 hours. Lab Results   Component Value Date/Time    Cholesterol, total 164 11/30/2021 02:23 AM    HDL Cholesterol 31 11/30/2021 02:23 AM    LDL, calculated 100 11/30/2021 02:23 AM    Triglyceride 165 (H) 11/30/2021 02:23 AM    CHOL/HDL Ratio 5.3 (H) 11/30/2021 02:23 AM     Echo (11/30/21):  · Saline contrast was given to evaluate for intracardiac shunt. · LV: Estimated LVEF is 15 - 20%. Normal wall thickness. Mildly dilated left ventricle. Severely and globally reduced systolic function. Severe (grade 3) left ventricular diastolic dysfunction. · RV: Severely reduced systolic function. · LA: Severely dilated left atrium. · RA: Moderately dilated right atrium. · AV: Severe aortic valve sclerosis with no evidence of reduced excursion. 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 .  · MV: Mitral valve thickening. Mild mitral annular calcification. Moderate mitral valve regurgitation is present. · PV: Mild pulmonic valve regurgitation is present. · IVC: Mildly elevated central venous pressure (8 mmHg); IVC diameter is less than 21 mm and collapses less than 50% with respiration.

## 2021-12-02 NOTE — PROGRESS NOTES
[facility] Progress Note           Yasir Hayes DO    [phone] Office  [phone]  Fax    2021      Assessment & Plan:   1. Leukocytosis. Etiology unclear. Remains afebrile since the time of admission. WBC increasing despite broad-spectrum antibiotics. Blood culture NGSF and UA bland. CT head, C-A-P negative for acute infectious process. No diarrhea to suggest C. difficile. No significant wounds. Not on steroids. Procalcitonin not elevated. Whole-body tagged white blood cell count for completeness. . Stop antibiotics if tagged white cell scan negative. 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 With no fevers and sterile blood cultures low suspicion for vegetation. No TUNDE planned. Discussed with cardiology  · Acute CVA. Neurology following  · Ischemic cardiomyopathy            Subjective:     No complaints    Objective:     Vitals:   Visit Vitals  BP (!) 138/91 (BP 1 Location: Right arm, BP Patient Position: At rest)   Pulse 69   Temp 97.2 °F (36.2 °C)   Resp 20   Ht 5' 4.02\" (1.626 m)   Wt 107 lb 2.3 oz (48.6 kg)   SpO2 92%   BMI 18.38 kg/m²        Tmax:  Temp (24hrs), Av.5 °F (36.4 °C), Min:97.2 °F (36.2 °C), Max:98 °F (36.7 °C)      Exam:   Patient is intubated:  no    Physical Examination:   General:  Alert, cooperative, no distress   Head:  Normocephalic, atraumatic. Eyes:  Conjunctivae clear   Neck: Supple       Lungs:   No distress. Chest wall:     Heart:     Abdomen:   non-distended   Extremities: Moves all. Skin:  No rash   Neurologic: CNII-XII intact. Normal strength     Labs:        No lab exists for component: ITNL   No results for input(s): CPK, CKMB, TROIQ in the last 72 hours.   Recent Labs     21  0340 21  0103 21  1013    147* 143   K 4.7 4.3 4.3   * 115* 111*   CO2 20* 22 22   BUN 49* 52* 52*   CREA 1.26* 1.31* 1.48* GLU 90 112* 100   MG 2.0  --   --    ALB  --   --  2.6*   WBC 34.2* 34.3* 30.2*   HGB 15.2 15.3 16.9*   HCT 50.5* 50.9* 55.6*    364 391     No results for input(s): INR, PTP, APTT, INREXT in the last 72 hours.   Needs: urine analysis, urine sodium, protein and creatinine  No results found for: LETICIA, CREAU      Cultures:     No results found for: SDES  Lab Results   Component Value Date/Time    Culture result: NO GROWTH 1 DAY 12/01/2021 12:21 PM    Culture result: NO GROWTH 3 DAYS 11/29/2021 01:55 PM       Radiology:     Medications       Current Facility-Administered Medications   Medication Dose Route Frequency Last Admin    levothyroxine (SYNTHROID) tablet 100 mcg  100 mcg Oral  mcg at 12/02/21 1210    heparin (porcine) 1,000 unit/mL injection          acetaminophen (TYLENOL) tablet 650 mg  650 mg Oral Q6H 650 mg at 12/02/21 1106    metroNIDAZOLE (FLAGYL) tablet 500 mg  500 mg Oral Q12H 500 mg at 12/02/21 0825    cefepime (MAXIPIME) 2 g in sterile water (preservative free) 10 mL IV syringe  2 g IntraVENous Q24H 2 g at 12/02/21 1210    atorvastatin (LIPITOR) tablet 10 mg  10 mg Oral DAILY 10 mg at 12/02/21 0825    acetaminophen (TYLENOL) tablet 650 mg  650 mg Oral Q4H  mg at 11/30/21 1239    Or    acetaminophen (TYLENOL) solution 650 mg  650 mg Per NG tube Q4H PRN      Or    acetaminophen (TYLENOL) suppository 650 mg  650 mg Rectal Q4H PRN      aspirin tablet 325 mg  325 mg Oral DAILY 325 mg at 12/02/21 0825    heparin (porcine) injection 5,000 Units  5,000 Units SubCUTAneous Q8H 5,000 Units at 12/02/21 0543           Case discussed with:      Hoa Ayala DO

## 2021-12-02 NOTE — PROGRESS NOTES
Roderick Adkins ayrely [address]  [address] [facility], [address] [place] [address]  [phone]      Medical Progress Note      NAME:         Terrell Smoker   :        1928  MRM:        [id number]    Date of service: 2021      Subjective: Patient has been seen and examined as a follow up for multiple medical issues. Chart, labs, diagnostics reviewed. She remains weak and says she has some back pain. More alert. No cough or SOB. Remains afebrile. Objective:    Vital Signs:    Visit Vitals  /80 (BP 1 Location: Right arm, BP Patient Position: At rest)   Pulse 73   Temp 98 °F (36.7 °C)   Resp 20   Ht 5' 4.02\" (1.626 m)   Wt 48.6 kg (107 lb 2.3 oz)   SpO2 93%   BMI 18.38 kg/m²          Intake/Output Summary (Last 24 hours) at 2021 5660  Last data filed at 2021 3064  Gross per 24 hour   Intake 340 ml   Output 675 ml   Net -335 ml        Physical Examination:    General:   Weak, cachectic and frail looking, not in acute distress    Eyes:   pink conjunctivae, PERRLA with no discharge. ENT:   no ottorrhea or rhinorrhea with dry mucous membranes  Neck: no masses, thyroid non-tender and trachea central.  Pulm: decreased but clear breath sounds without crackles or wheezes  Card:  has regular and normal S1, S2 without thrills, bruits or peripheral edema  Abd:  Soft, non-tender, non-distended, normoactive bowel sounds   Musc:  No cyanosis, clubbing, atrophy or deformities. Skin:  No rashes, bruising. Redness over sacral bony prominence. No overt ulcers. Neuro: Awake and alert, less confused.  Generally a non focal exam. Limited exam.   Psych:  Has little insight to her illness at this time     Current Facility-Administered Medications   Medication Dose Route Frequency    heparin (porcine) 1,000 unit/mL injection 10,000 Units  10,000 Units IntraVENous RAD ONCE    acetaminophen (TYLENOL) tablet 650 mg  650 mg Oral Q6H    metroNIDAZOLE (FLAGYL) tablet 500 mg  500 mg Oral Q12H    cefepime (MAXIPIME) 2 g in sterile water (preservative free) 10 mL IV syringe  2 g IntraVENous Q24H    atorvastatin (LIPITOR) tablet 10 mg  10 mg Oral DAILY    acetaminophen (TYLENOL) tablet 650 mg  650 mg Oral Q4H PRN    Or    acetaminophen (TYLENOL) solution 650 mg  650 mg Per NG tube Q4H PRN    Or    acetaminophen (TYLENOL) suppository 650 mg  650 mg Rectal Q4H PRN    aspirin tablet 325 mg  325 mg Oral DAILY    heparin (porcine) injection 5,000 Units  5,000 Units SubCUTAneous Q8H        Laboratory data and review:    Recent Labs     12/02/21  0340 12/01/21  0103 11/30/21  1013   WBC 34.2* 34.3* 30.2*   HGB 15.2 15.3 16.9*   HCT 50.5* 50.9* 55.6*    364 391     Recent Labs     12/02/21  0340 12/01/21  0103 11/30/21  1013 11/29/21  1245 11/29/21  1245    147* 143   < > 141   K 4.7 4.3 4.3   < > 4.5   * 115* 111*   < > 104   CO2 20* 22 22   < > 26   GLU 90 112* 100   < > 153*   BUN 49* 52* 52*   < > 53*   CREA 1.26* 1.31* 1.48*   < > 1.91*   CA 8.6 8.0* 9.3   < > 9.6   MG 2.0  --   --   --   --    ALB  --   --  2.6*  --  2.9*   ALT  --   --  31  --  27    < > = values in this interval not displayed. No components found for: [address]: Imaging studies have been reviewed    Telemetry reviewed by me:   normal sinus rhythm    Assessment and Plan:    Leukocytosis (11/30/2021) POA: unclear as to why she has a persistent leukocytosis despite empiric IV antibiotics. CT scans chest, abdomen and pelvis unremarkable for any obvious focus of infection. No obvious wounds. Blood cultures neg. Peripheral smear shows a reactive picture. Lactate trending down. Given concern for an occult infection, Continue empiric IV Cefepime and Metronidazole. No IV fluids given her low EF. Seen by ID.  Getting a WBC scan     Acute CVA (cerebrovascular accident) ([facility] [address] (11/30/2021) / Dysarthria (11/29/2021) POA: MRi brain showed several small foci of acute infarction in the cerebellar vermis, left occipital lobe, and left parietal lobe. Doppler carotids pending. , A1c 5.7. Echo showed severe aortic sclerosis, EF 15-20%. No shunt. Seen by neurology. Continue Asprin, Lipitor, PT, OT and speech therapies. Will need SNF placement. Palliative care following. Coronary atherosclerosis of native coronary artery / Elevated troponin (11/29/2021) /  Ischemic cardiomyopathy (11/30/2021) POA: Hx CAD MI 12/26/12- [place]. Now with a mild troponin elevation. No overt ischemia. Had an episode of NSVT yesterday. Lytes OK. Echo showed a reduced EF of 15-20% with valvular disease. Continue Asprin, Lipitor. Acute metabolic encephalopathy (69/32/5503) POA: likely due to the CVA vs suspected occult infection vs underlying dementia. Continue to monitor her clinical progress     Hypothyroidism (4/17/2013) POA: TSH is normal. Unclear if she is taking any medications. Monitor for now     Underweight (11/30/2021) POA: due to advanced age, likely poor intake.  Diet as tolerated when able    Total time spent for the patient's care: [facility] discussed with: Patient, Care Manager and Nursing Staff    Discussed:  Care Plan and D/C Planning    Prophylaxis:  Hep SQ    Anticipated Disposition:  SNF/LTC            ___________________________________________________    Attending Physician:   Jackson Ayala MD

## 2021-12-02 NOTE — PROGRESS NOTES
Patient had 5 beat run of VTach followed by SVT, then NSR with rate in the 60s and 70s. Patient asymptomatic and resting. MD notified with no new orders received. Bedside and Verbal shift change report given to Zac Lo (oncoming nurse) by Chhaya Balderas RN (offgoing nurse). Report included the following information SBAR, Kardex, MAR, Accordion and Recent Results.

## 2021-12-02 NOTE — PROGRESS NOTES
Patient had approximately 30 seconds of SVT as seen on telemetry. Heart rate returned to 70s. Patient asymptomatic. MD notified and orders received to notify physician if patient experiences sustained tachycardia. Will continue to monitor.

## 2021-12-02 NOTE — PROGRESS NOTES
Bedside shift change report given to Clara RAZA. XENIA Malin (oncoming nurse) by Aracelis Arndt (offgoing nurse). Report included the following information SBAR, Kardex, ED Summary, Intake/Output, MAR and Recent Results. Bedside shift change report given to Eduardo Alcides Vann (oncoming nurse) by Tiffanie Elizondo RN (offgoing nurse). Report included the following information SBAR, Kardex, ED Summary, Intake/Output, MAR and Recent Results.

## 2021-12-02 NOTE — PROGRESS NOTES
Bedside and Verbal shift change report given to [address] Malin (oncoming nurse) by Cindy Miller RN (offgoing nurse). Report included the following information SBAR, Kardex, MAR, Accordion and Recent Results.

## 2021-12-02 NOTE — WOUND CARE
Wound Consult: Follow Up Visit. Chart reviewed. Consulted for sacral redness and following for same. Spoke with patients nurse,  Kerin Mortimer and we were at bedside together to turn/assess and provide care. Patient is resting on a Centrella bed with air support mattress. Heels off loaded with pillows. Patient is alert, Kerin Mortimer working with her today to help alleviate discomfort in sacral area using waffle cushion under patient in bed; patient does not stay turned in 30 degree turn; requires one person assist to move side to side in bed. Michele score 14; Pure wick in use. Assessment:  Sacral area - red/pink blanching intact skin, patient has tenderness to area. Right inner buttock - 0.1 x 0.1 cm chaffed area; pink, no surrounding redness. No redness to heels or spine. Treatment:  Lightly dusted sacra/buttocks area with zinc ointment which felt good per patient, reapplied Sacral Mepilex dressing, patient comfortable lying fully on left side to off load her buttocks - she will not stay fully sidelying for long but is comfortable and believe this will help greatly alleviate sacral redness/discomfort, additionally, she is lying on waffle cushion. Wound Recommendations:  Continue preventative sacral dressing. Waffle cushion has added comfort. Skin Care / PI Prevention Recommendations:  1. Minimize friction/shear: minimize layers of linen/pads under patient. 2. Off load pressure/reposition:  turn and reposition approximately every 2 hours; float heels with pillows or use off loading heel boots; waffle cushion for sitting and while in bed; mobility team following. 3. Manage Moisture - keep skin folds dry; incontinence skin care with incontinence wipes; zinc barrier ointment as needed; appropriate sized briefs as needed; Pure wick in use to help contain urine. 4. Continue to monitor nutrition, pain, and skin risk scale, and skin assessment. Plan: We will continue to reassess routinely and as needed.   Please re-consult should concerns arise despite continued skin/PI prevention measures.     Pb Shook, MSN, RN, [address] [facility] / [address] [facility] Office [phone]

## 2021-12-02 NOTE — PROGRESS NOTES
Problem: Dysphagia (Adult)  Goal: *Acute Goals and Plan of Care (Insert Text)  Description: Speech pathology goals  Initiated 11/30/2021  1. Patient will tolerate puree/thin liquid diet with no overt s/s aspiration within 7 days  2. Patient will tolerate trials of soft solids with timely/complete mastication and full oral clearance within 7 days  Outcome: Progressing Towards Goal   SPEECH LANGUAGE PATHOLOGY DYSPHAGIA TREATMENT  Patient: Mckenzie Hanson (24 y.o. female)  Date: 12/2/2021  Diagnosis: Altered mental state [R41.82]  Elevated troponin [R77.8]  Dysarthria [R47.1]   Acute CVA (cerebrovascular accident) ([facility] [address]       Precautions: fall      ASSESSMENT:  Patient with very limited PO intake this date, which is consistent with what she has been doing all day, per RN. Accepted single sips of water via straw. She coughed once after drinking and then burping. Offered other foods to include ice cream. She refused all of them. Unable to assess for diet advancement this date. PLAN:  Recommendations and Planned Interventions:  Continue puree/thins  Will follow for advancement as appropriate. Patient continues to benefit from skilled intervention to address the above impairments. Continue treatment per established plan of care. Discharge Recommendations: To Be Determined     SUBJECTIVE:   Patient stated Oh honey, I don't think I can eat that right now. OBJECTIVE:   Cognitive and Communication Status:  Neurologic State: Alert  Orientation Level: Oriented to person, Oriented to place  Cognition: Follows commands  Perception: Cues to maintain midline in sitting  Perseveration: No perseveration noted  Safety/Judgement: Awareness of environment  Dysphagia Treatment:  Oral Assessment:  Oral Assessment  Oral Hygiene: dry  P.O. Trials:  Patient Position: upright in bed  Vocal quality prior to P.O.: No impairment  Consistency Presented:  Thin liquid  How Presented: Self-fed/presented; Straw     Bolus Acceptance: No impairment  Bolus Formation/Control: No impairment     Propulsion: No impairment  Oral Residue: None        Aspiration Signs/Symptoms: Strong cough                                                                                                                                                         Pain:  Pain Scale 1: Numeric (0 - 10)  Pain Intensity 1: 0       After treatment:   Patient left in no apparent distress in bed, Call bell within reach, and Nursing notified    COMMUNICATION/EDUCATION:   Patient was educated regarding purpose of SLP visit. She agreed to participate. The patient's plan of care including recommendations, planned interventions, and recommended diet changes were discussed with: Registered nurse.      KUSH Short  Time Calculation: 12 mins

## 2021-12-02 NOTE — PROGRESS NOTES
1900- Bedside and Verbal shift change report given to Lamar Dean (oncoming nurse) by Calvin Calhoun (offgoing nurse). Report included the following information SBAR, Intake/Output, Recent Results and Cardiac Rhythm NSR. run SVT. Notified by tele overnight patient had short run SVT and a 6 beat run v-tach. Asymptomatic. This patient was assisted with Intentional Toileting every 2 hours during this shift as appropriate. Documentation of ambulation and output reflected on Flowsheet as appropriate. Purposeful hourly rounding was completed using AIDET and 5Ps. Outcomes of PHR documented as they occurred. Bed alarm in use as appropriate. Dual Suction and ambubag in place. 0730- Bedside and Verbal shift change report given to Calvin Calhoun (oncoming nurse) by Lamar Dean (offgoing nurse). Report included the following information SBAR, Intake/Output, Recent Results and Cardiac Rhythm NSR, run SVT.

## 2021-12-02 NOTE — PROGRESS NOTES
Problem: Mobility Impaired (Adult and Pediatric)  Goal: *Acute Goals and Plan of Care (Insert Text)  Description: FUNCTIONAL STATUS PRIOR TO ADMISSION: Patient was independent and active without use of DME.    HOME SUPPORT PRIOR TO ADMISSION: The patient lived alone with brother to provide assistance. Physical Therapy Goals  Initiated 11/30/2021  1. Patient will move from supine to sit and sit to supine , scoot up and down, and roll side to side in bed with minimal assistance/contact guard assist within 7 day(s). 2.  Patient will transfer from bed to chair and chair to bed with minimal assistance/contact guard assist using the least restrictive device within 7 day(s). 3.  Patient will perform sit to stand with minimal assistance/contact guard assist within 7 day(s). 4.  Patient will ambulate with minimal assistance/contact guard assist for 100 feet with the least restrictive device within 7 day(s). Outcome: Progressing Towards Goal   PHYSICAL THERAPY TREATMENT  Patient: David Sorensen (26 y.o. female)  Date: 12/2/2021  Diagnosis: Altered mental state [R41.82]  Elevated troponin [R77.8]  Dysarthria [R47.1]   Acute CVA (cerebrovascular accident) [facility])       Precautions:  fall  Chart, physical therapy assessment, plan of care and goals were reviewed. ASSESSMENT  Patient continues with skilled PT services and is progressing towards goals. Communicated with nurse who was in the room with the patient cleared for therapy. Patient just worked with OT earlier patient declined OOB activity but agreed with bed level activity. Performed some active and active assistive range of motion exercise on both LE all planes. Place bed to modified chair position and continue to do some thera exercise reposition patient up high on the bed for comfort. Activated bed alarm and notified nurse who agreed to monitor patient.        Current Level of Function Impacting Discharge (mobility/balance): mod assist with bed mobility    Other factors to consider for discharge: fall         PLAN :  Patient continues to benefit from skilled intervention to address the above impairments. Continue treatment per established plan of care. to address goals. Recommendation for discharge: (in order for the patient to meet his/her long term goals)  Therapy up to 5 days/week in SNF setting    This discharge recommendation:  Has been made in collaboration with the attending provider and/or case management    IF patient discharges home will need the following DME: to be determined (TBD)       SUBJECTIVE:   Patient stated ok.     OBJECTIVE DATA SUMMARY:   Critical Behavior:  Neurologic State: Alert  Orientation Level: Oriented to person, Oriented to place  Cognition: Follows commands  Safety/Judgement: Awareness of environment  Functional Mobility Training:  Bed Mobility:  Rolling: Moderate assistance  Supine to Sit: Moderate assistance; Assist x2  Sit to Supine: Moderate assistance; Assist x2  Scooting: Moderate assistance        Transfers:  Sit to Stand: Moderate assistance; Assist x2  Stand to Sit: Moderate assistance                             Balance:  Sitting: Impaired; With support  Sitting - Static: Fair (occasional)  Sitting - Dynamic: Fair (occasional)  Standing: Impaired; With support  Standing - Static: Fair  Standing - Dynamic : Poor  Ambulation/Gait Training:             Therapeutic Exercises:   Educate and instructed patient to continue active range of motion exercise on both legs while up  on bed multiple times. Pain Ratin/10    Activity Tolerance:   Good    After treatment patient left in no apparent distress:   Supine in bed, Heels elevated for pressure relief, Call bell within reach, Bed / chair alarm activated and Side rails x 3    COMMUNICATION/COLLABORATION:   The patients plan of care was discussed with: Occupational therapist and Registered nurse. Cyndi Rodríguez PT.[facility].    Time Calculation:  mins

## 2021-12-02 NOTE — CONSULTS
Palliative Medicine Consult  [place]: [phone])    Patient Name: Chalino Leung  YOB: 1928    Date of Initial Consult: 2021  Reason for Consult: Care Decisions  Requesting Provider: Dr. Larry Jerome   Primary Care Physician: Darren Cox MD     SUMMARY:   Chalino Leung is a 80 y.o. with a past history of HTN,  CHF, EF 30% (), CAD, MI (), Hypothyroidism and Anxiety, who was admitted on 2021 from Home with a diagnosis of Acute CVA, SAMMY and  Leukocytosis. Patient presented to ER w/ cc of new confusion, slurred speech and weakness. Chart review- Neurology following encephalopathy and  acute CVA, EEG pending. Cardiology following, carotid doppler and echo pending. Current medical issues leading to Palliative Medicine involvement include: Care decisions in setting of advanced age, advanced heart disease and  acute stroke resulting in right sided weakness. 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 She is , had 1 son who is . Patient lives in her own home, drives, grocery shops, but her brother, nieces and nephews check on her daily. Baseline cognitive and functional status: Alert and oriented, family deny cognitive impairments. She is Independent with all ADLs, still drives, shops for herself, enjoys going to [employer]. PALLIATIVE DIAGNOSES:   1. Palliative care encounter  2. Altered mental status, unspecified  3. Impaired communication  4. Cachexia  5. Weakness, generalized  6. Advanced care Planning discussion  7. DNR discussion  8. Goals of care discussion       PLAN:   1. Prior to visit completed chart reviewed for updated information. 2. Patient was seen x2, initial visit no family at bedside, returning later when her brother Odell and nephew Fitz/ were at bedside.     3. Patient oriented to people and place, she has no insight regarding hospitalization and associated concerns, deferring to her brother. 4. Speech evaluated today, continue to recommend puréed with thins. Has had poor intake, only bites and sips. 5. When I returned to patient's room later in the day, her brother JOHNSON and nephew Fitz/Dalia were at bedside. Provided them medical update,  and spent time answering questions. 6. DNR discussion-revisited CODE STATUS with JOHNSON and Dalia, recommended DNR in setting of advanced age,, EF 20% and new CVA/Debility. Family elected to change CODE STATUS to DNR. Will follow up tomorrow to complete a durable DNR. 7. Advance care planning discussion-the patient is not demonstrate capacity for complex decision-making, she has been consistent with telling me that her brother Franck Hall and nephew Layo Arrington are the 2 people she trusts to make decisions for her. I will follow-up again tomorrow, she may be able to complete in AMD.  8. Goals of care discussion- Short Term Plan-Discharge to SNF, w/ plan to transition to LTC after therapy completed. I did not discuss hospice/comfort care today, however will introduce option of comfort care follow-up  9. Palliative team will continue to follow with you. 10. Initial consult note routed to primary continuity provider and/or primary health care team members  11. Communicated plan of care with: Palliative [facility] Team, Ronald Long RN RN, Dr. Rafita Pereira / TREATMENT PREFERENCES:     GOALS OF CARE:       TREATMENT PREFERENCES:   Code Status: DNR    Patient and family's personal goals include: being in her own home, independent    Important upcoming milestones or family events: unknown    The patient identifies the following as important for living well: unknown      Advance Care Planning:  [x] The [facility] Interdisciplinary Team has updated the ACP Navigator with [address] and Patient Capacity      Primary Decision Maker (Active):  Carlitos Cea - [phone]    Secondary Decision Maker: Neel Marti (nephew) - Other Relative - [phone]  Advance Care Planning 11/30/2021   Confirm Advance Directive None   Patient Would Like to Complete Advance Directive No               Other:    As far as possible, the palliative care team has discussed with patient / health care proxy about goals of care / treatment preferences for patient. HISTORY:     History obtained from: medical records/family    CHIEF COMPLAINT: I am cold    HPI/SUBJECTIVE:    The patient is:   [] Verbal and participatory  [x] Non-participatory due to:   Patient stated that she was cold, needed an extra blanket, denied pain, denied having much of an appetite, expressed no other concerns. She was confused, unable to provide ROS      Clinical Pain Assessment (nonverbal scale for severity on nonverbal patients):              Duration: for how long has pt been experiencing pain (e.g., 2 days, 1 month, years)  Frequency: how often pain is an issue (e.g., several times per day, once every few days, constant)     FUNCTIONAL ASSESSMENT:     Palliative Performance Scale (PPS): 30          PSYCHOSOCIAL/SPIRITUAL SCREENING:     Palliative IDT has assessed this patient for cultural preferences / practices and a referral made as appropriate to needs (Cultural Services, Patient Advocacy, Ethics, etc.)    Any spiritual / Lutheran concerns:  [] Yes /  [x] No    Caregiver Burnout:  [] Yes /  [x] No /  [] No Caregiver Present      Anticipatory grief assessment:   [x] Normal  / [] Maladaptive       ESAS Anxiety:      ESAS Depression:          REVIEW OF SYSTEMS:     Positive and pertinent negative findings in ROS are noted above in HPI. The following systems were [] reviewed / [x] unable to be reviewed as noted in HPI  Other findings are noted below. Systems: constitutional, ears/nose/mouth/throat, respiratory, gastrointestinal, genitourinary, musculoskeletal, integumentary, neurologic, psychiatric, endocrine.  Positive findings noted below.  Modified ESAS Completed by: provider                                   Stool Occurrence(s): 0        PHYSICAL EXAM:     From RN flowsheet:  Wt Readings from Last 3 Encounters:   11/30/21 102 lb 1.2 oz (46.3 kg)   04/18/13 122 lb (55.3 kg)     Blood pressure 116/68, pulse 73, temperature 97.4 °F (36.3 °C), resp. rate 17, height 5' 4.02\" (1.626 m), weight 102 lb 1.2 oz (46.3 kg), SpO2 92 %.     Pain Scale 1: Numeric (0 - 10)  Pain Intensity 1: 0  Pain Onset 1: chronic  Pain Location 1: Foot  Pain Orientation 1: Right  Pain Description 1: Aching  Pain Intervention(s) 1: Medication (see MAR)  Last bowel movement, if known:     Constitutional: appears stated age, cachectic, frail, NAD  Eyes: pupils equal, anicteric  ENMT: no nasal discharge, dry mucous membranes  Cardiovascular: regular rhythm, distal pulses intact  Respiratory: breathing not labored, symmetric  Gastrointestinal: soft non-tender, +bowel sounds  Musculoskeletal: no deformity, no tenderness to palpation  Skin: warm, dry  Neurologic: following simple  commands, weakly moving all extremities, speech seemed less slurred and more intelligible today  Psychiatric: appropriate affect, unable to assess for  hallucinations  Other:       HISTORY:     Principal Problem:    Acute CVA (cerebrovascular accident) ([address] (11/30/2021)    Active Problems:    Coronary atherosclerosis of native coronary artery (4/15/2013)      Overview: MI 12/26/12- [place]      Hypothyroidism (4/17/2013)      Elevated troponin (11/29/2021)      Dysarthria (11/29/2021)      Ischemic cardiomyopathy (11/30/2021)      Acute metabolic encephalopathy (73/83/3674)      Leukocytosis (11/30/2021)      Underweight (11/30/2021)      Past Medical History:   Diagnosis Date    Acute MI ([address]     Anxiety     CAD (coronary artery disease)     Hypertension       Past Surgical History:   Procedure Laterality Date    CARDIAC SURG PROCEDURE UNLIST      HX GYN      hysterectomy    HX ORTHOPAEDIC Family History   Problem Relation Age of Onset    Other Unknown         unknown      History reviewed, no pertinent family history. Social History     Tobacco Use    Smoking status: Never Smoker    Smokeless tobacco: Not on file   Substance Use Topics    Alcohol use: No     No Known Allergies   Current Facility-Administered Medications   Medication Dose Route Frequency    acetaminophen (TYLENOL) tablet 650 mg  650 mg Oral Q6H    metroNIDAZOLE (FLAGYL) tablet 500 mg  500 mg Oral Q12H    cefepime (MAXIPIME) 2 g in sterile water (preservative free) 10 mL IV syringe  2 g IntraVENous Q24H    atorvastatin (LIPITOR) tablet 10 mg  10 mg Oral DAILY    acetaminophen (TYLENOL) tablet 650 mg  650 mg Oral Q4H PRN    Or    acetaminophen (TYLENOL) solution 650 mg  650 mg Per NG tube Q4H PRN    Or    acetaminophen (TYLENOL) suppository 650 mg  650 mg Rectal Q4H PRN    aspirin tablet 325 mg  325 mg Oral DAILY    heparin (porcine) injection 5,000 Units  5,000 Units SubCUTAneous Q8H          LAB AND IMAGING FINDINGS:     Lab Results   Component Value Date/Time    WBC 34.3 (H) 12/01/2021 01:03 AM    HGB 15.3 12/01/2021 01:03 AM    PLATELET 180 73/06/3772 01:03 AM     Lab Results   Component Value Date/Time    Sodium 147 (H) 12/01/2021 01:03 AM    Potassium 4.3 12/01/2021 01:03 AM    Chloride 115 (H) 12/01/2021 01:03 AM    CO2 22 12/01/2021 01:03 AM    BUN 52 (H) 12/01/2021 01:03 AM    Creatinine 1.31 (H) 12/01/2021 01:03 AM    Calcium 8.0 (L) 12/01/2021 01:03 AM    Magnesium 2.1 04/18/2013 04:00 AM    Phosphorus 3.7 04/16/2013 01:10 AM      Lab Results   Component Value Date/Time    Alk.  phosphatase 98 11/30/2021 10:13 AM    Protein, total 7.0 11/30/2021 10:13 AM    Albumin 2.6 (L) 11/30/2021 10:13 AM    Globulin 4.4 (H) 11/30/2021 10:13 AM     Lab Results   Component Value Date/Time    INR 1.2 (H) 04/16/2013 01:10 AM    Prothrombin time 12.4 (H) 04/16/2013 01:10 AM    aPTT 31.5 04/16/2013 01:10 AM      No results found for: IRON, FE, TIBC, IBCT, PSAT, FERR   No results found for: PH, PCO2, PO2  No components found for: [place]   Lab Results   Component Value Date/Time     11/29/2021 12:45 PM    CK - MB 0.9 04/16/2013 01:10 AM                Total time: 35 min  Counseling / coordination time, spent as noted above: 30 min  > 50% counseling / coordination?: yes    Prolonged service was provided for  []30 min   []75 min in face to face time in the presence of the patient, spent as noted above. Time Start:   Time End:   Note: this can only be billed with 74972 (initial) or 14875 (follow up). If multiple start / stop times, list each separately.

## 2021-12-02 NOTE — PROGRESS NOTES
Problem: Self Care Deficits Care Plan (Adult)  Goal: *Acute Goals and Plan of Care (Insert Text)  Description:   FUNCTIONAL STATUS PRIOR TO ADMISSION: Patient was independent and active without use of DME.     HOME SUPPORT: The patient lived alone with no local support. Occupational Therapy Goals  Initiated 11/30/2021  1. Patient will perform grooming with minimal assistance within 7 day(s). 2.  Patient will perform upper body dressing and bathing with moderate assistance  within 7 day(s). 3.  Patient will perform lower body dressing and bathing with moderate assistance  within 7 day(s). 4.  Patient will perform toilet transfers to [facility] with moderate assistance within 7 day(s). 5.  Patient will perform all aspects of toileting with moderate assistance  within 7 day(s). 6.  Patient will participate in upper extremity therapeutic exercise/activities with minimum assistance for 10 minutes within 7 day(s). 7.  Patient will engage in full Fugl-Barrett Assessment of Upper Extremity within 7 days. Outcome: Progressing Towards Goal     OCCUPATIONAL THERAPY TREATMENT  Patient: Kalyan Jiménez (3 y.o. female)  Date: 12/2/2021  Diagnosis: Altered mental state [R41.82]  Elevated troponin [R77.8]  Dysarthria [R47.1]   Acute CVA (cerebrovascular accident) [facility])      Precautions: Fall, Bed Alarm, DNR, Skin  Chart, occupational therapy assessment, plan of care, and goals were reviewed. ASSESSMENT  Patient continues with skilled OT services and is progressing towards goals, however, is limited by impaired cognition, anxiety, generalized weakness and low activity tolerance. Pt received supine in bed and willing to work with therapy. Pt anxious about movements and required Mod Ax2 for supine<>sit EOB. Demonstrated ability to sit EOB with support approximately 5 minutes. Attempted [facility] [place], however, unable to complete due to pt anxiety.  Demo'd good BUE strength (4-) and functional BUE ROM, although limited by arthritis. No difference in R and L grasp strength (4-). Completed sit>stand (Mod A x2) with 4 sidesteps towards head of bed. Pt visibly fatigued with activity and refusing to hold spoon or bottle to self feed despite good UE strength and ROM. Currently recommend SNF at discharge to increase safety and independence in ADLs. Current Level of Function Impacting Discharge (ADLs): Total A for LE dressing, Total for toileting, Mod Ax2 for functional mobility     Other factors to consider for discharge: Lives alone, Independent at baseline          PLAN :  Patient continues to benefit from skilled intervention to address the above impairments. Continue treatment per established plan of care to address goals. Recommend with staff: Encourage participation in self care (feeding, grooming); Seated upright throughout the day as able    Recommend next OT session: Grooming at EOB or standing with tray table  Recommendation for discharge: (in order for the patient to meet his/her long term goals)  Therapy up to 5 days/week in SNF setting    This discharge recommendation:  Has been made in collaboration with the attending provider and/or case management    IF patient discharges home will need the following DME: TBD         SUBJECTIVE:   Patient stated I'm scared.     OBJECTIVE DATA SUMMARY:   Cognitive/Behavioral Status:  Neurologic State: Alert  Orientation Level: Oriented to person; Oriented to place; Oriented to time; Disoriented to situation  Cognition: Follows commands    Functional Mobility and Transfers for ADLs:  Bed Mobility:  Rolling: Moderate assistance  Supine to Sit: Moderate assistance; Assist x2  Sit to Supine: Moderate assistance; Assist x2  Scooting: Moderate assistance    Transfers:  Sit to Stand: Moderate assistance; Assist x2    Balance:  Sitting: Impaired; With support  Sitting - Static: Fair (occasional)  Sitting - Dynamic: Fair (occasional)  Standing: Impaired;  With support  Standing - Static: Fair  Standing - Dynamic : Poor    ADLs:  Total Assist for slipper sock management     Total Assist to feed self one bite of fruit and hold ensure bottle for one sip     Pain:  Back and shoulder pain due to arthritis     Activity Tolerance:   Fair and requires rest breaks      After treatment patient left in no apparent distress:   Supine in bed, Heels elevated for pressure relief, Call bell within reach, Bed / chair alarm activated, Caregiver / family present and Side rails x 3    COMMUNICATION/COLLABORATION:   The patients plan of care was discussed with: Physical therapist, Speech therapist and Registered nurse.        Fanny Clements OT  Time Calculation: 26 mins

## 2021-12-02 NOTE — PROGRESS NOTES
Nutrition Note    BPA for pressure ulcer. RD already following and providing supplementation. Will rescreen as indicated. Thank you.      Electronically signed by Martin Hernandez RD on 37/3/2975  Contact: [phone] or via [employer]

## 2021-12-02 NOTE — PROGRESS NOTES
Patient has been identified as appropriate for SNF placement. SNF rehab discussed with patient and her brother Odell and nephew Arslan Jimenez. SNF provider list given to brother. Choice is 1. [facility], [facility]. [facility], 3. [facility], referral sent via [employer]. [place] of Choice obtained. Await response. Care Management Interventions  PCP Verified by CM: Yes (Dr. Vincenzo Steele)  Mode of Transport at Discharge:  Other (see comment) (per rachel Gorman)  Transition of Care Consult (CM Consult): Discharge Planning  Physical Therapy Consult: Yes  Occupational Therapy Consult: Yes  Speech Therapy Consult: Yes  Support Systems: Other Family Member(s)  Confirm Follow Up Transport: Family  The Plan for Transition of Care is Related to the Following Treatment Goals : R/O CVA  The Patient and/or Patient Representative was Provided with a Choice of Provider and Agrees with the Discharge Plan?: (S) Yes  Name of the Patient Representative Who was Provided with a Choice of Provider and Agrees with the Discharge Plan: Odell Gorman,   Freedom of Choice List was Provided with Basic Dialogue that Supports the Patient's Individualized Plan of Care/Goals, Treatment Preferences and Shares the Quality Data Associated with the Providers?: (S) Yes  Discharge Location  Discharge Placement: Skilled nursing facility    Cesar Sanchez RN, MSN/Care manager

## 2021-12-02 NOTE — CONSULTS
Palliative Medicine Consult  [place]: [phone] ([phone])    Patient Name: Sergio Gonzalez  YOB: 1928    Date of Initial Consult: 2021  Reason for Consult: Care Decisions  Requesting Provider: Dr. Benton Ganser   Primary Care Physician: Diamante Simeon MD     SUMMARY:   Sergio Gonzalez is a 80 y.o. with a past history of HTN,  CHF, EF 30% (), CAD, MI (), Hypothyroidism and Anxiety, who was admitted on 2021 from Home with a diagnosis of Acute CVA, SAMMY and  Leukocytosis. Patient presented to ER w/ cc of new confusion, slurred speech and weakness. Chart review- Neurology following encephalopathy and  acute CVA, EEG pending. Cardiology following, carotid doppler and echo pending. Current medical issues leading to Palliative Medicine involvement include: Care decisions in setting of advanced age, advanced heart disease and  acute stroke resulting in right sided weakness. 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 She is , had 1 son who is . Patient lives in her own home, drives, grocery shops, but her brother, nieces and nephews check on her daily. Baseline cognitive and functional status: Alert and oriented, family deny cognitive impairments. She is Independent with all ADLs, still drives, shops for herself, enjoys going to [employer]. PALLIATIVE DIAGNOSES:   1. Palliative care encounter  2. Altered mental status, unspecified  3. Impaired communication  4. Cachexia  5. Weakness, generalized  6. Advanced care Planning discussion  7. DNR discussion  8. Goals of care discussion       PLAN:   1. Prior to visit completed chart reviewed for updated information. 2. Patient was seen, no family at bedside. 3. Patient oriented to people and place, still with little insight regarding hospitalization and associated concerns, deferring to her brother.   4. Intake remains very poor, though she does seem to prefer nutritional drinks, she only takes sips, not enough to sustain life. Speech therapist also following for dysphagia. 5. DNR discussion-Patient now with DNR code status. Plan for her brother ODELL to sign DDNR, unfortunately he had already left. [place] [facility] [place] schedule time to meet  6. Weakness, generalized- Below baseline, multifactorial etiology, including CVA, hospital associated deconditioning and very poor nutrition. Patient is able to hold cup and drink from straw, able to weakly  feed self, but otherwise she is very debilitated, virtually  bedbound, unable to reposition self and is dependent on all other  ADL. PT/OT working with patient, however she decline OOB. I anticipate she will not make significant gains in rehab.  7. Goals of care discussion- Spoke with patients nephew Fisher, he stated that family had provided their 3 SNF choices. Will fu with her brother Odell tomorrow to discuss plans for  LTC facility w/ support of Hospice  if she does not make significant gains during rehab.  8.  Palliative team will continue to follow with you. 9. Initial consult note routed to primary continuity provider and/or primary health care team members  10. Communicated plan of care with: Palliative IDT, [facility] Team, Ninfa Pelaez RN RN, Dr. Kwan Ann / TREATMENT PREFERENCES:     GOALS OF CARE:  Patient/Health Care Proxy Stated Goals: Rehabilitation    TREATMENT PREFERENCES:   Code Status: DNR    Patient and family's personal goals include: being in her own home, independent    Important upcoming milestones or family events: unknown    The patient identifies the following as important for living well: unknown      Advance Care Planning:  [x] The [facility] Interdisciplinary Team has updated the ACP Navigator with [address] and Patient Capacity      Primary Decision Maker (Active):  Christian Rowland - [phone]    Secondary Decision Maker: Kristi Garcia (nephew) - Other Relative - [phone]  Advance Care Planning 11/30/2021   Confirm Advance Directive None   Patient Would Like to Complete Advance Directive No       Medical Interventions: Limited additional interventions       Other:    As far as possible, the palliative care team has discussed with patient / health care proxy about goals of care / treatment preferences for patient. HISTORY:     History obtained from: medical records/family    CHIEF COMPLAINT: My feet are cold    HPI/SUBJECTIVE:    The patient is:   [] Verbal and participatory  [x] Non-participatory due to:   Patient stated that her feet are cold, denied pain, stated she feels a ittle SOB, needs to be repositioned. 12/2- ID following for Leukocytosis of unknown etiology, she is afebrile. Cardiology does not recommend further workup or intervention, burden for sure outweighs benefit. Worked with  PT/OT in bed, she refused OOB.  Very poor intake, only bites and sips, speech pathologist following for dysphagia,       Clinical Pain Assessment (nonverbal scale for severity on nonverbal patients):   Clinical Pain Assessment  Severity: 0          Duration: for how long has pt been experiencing pain (e.g., 2 days, 1 month, years)  Frequency: how often pain is an issue (e.g., several times per day, once every few days, constant)     FUNCTIONAL ASSESSMENT:     Palliative Performance Scale (PPS): 30  PPS: 30       PSYCHOSOCIAL/SPIRITUAL SCREENING:     Palliative IDT has assessed this patient for cultural preferences / practices and a referral made as appropriate to needs (Cultural Services, Patient Advocacy, Ethics, etc.)    Any spiritual / Adventist concerns:  [] Yes /  [x] No    Caregiver Burnout:  [] Yes /  [x] No /  [] No Caregiver Present      Anticipatory grief assessment:   [x] Normal  / [] Maladaptive       ESAS Anxiety: Anxiety: 0    ESAS Depression: Depression: 0        REVIEW OF SYSTEMS:     Positive and pertinent negative findings in ROS are noted above in HPI. The following systems were [] reviewed / [x] unable to be reviewed as noted in HPI  Other findings are noted below. Systems: constitutional, ears/nose/mouth/throat, respiratory, gastrointestinal, genitourinary, musculoskeletal, integumentary, neurologic, psychiatric, endocrine. Positive findings noted below. Modified ESAS Completed by: provider   Fatigue: 7 Drowsiness: 0   Depression: 0 Pain: 0   Anxiety: 0 Nausea: 0   Anorexia: 10 Dyspnea: 2     Constipation: No     Stool Occurrence(s): 0        PHYSICAL EXAM:     From RN flowsheet:  Wt Readings from Last 3 Encounters:   12/02/21 107 lb 2.3 oz (48.6 kg)   04/18/13 122 lb (55.3 kg)     Blood pressure (!) 152/83, pulse 80, temperature 97.7 °F (36.5 °C), resp. rate 20, height 5' 4.02\" (1.626 m), weight 107 lb 2.3 oz (48.6 kg), SpO2 93 %.     Pain Scale 1: Numeric (0 - 10)  Pain Intensity 1: 0  Pain Onset 1: chronic  Pain Location 1: Foot  Pain Orientation 1: Right  Pain Description 1: Aching  Pain Intervention(s) 1: Medication (see MAR)  Last bowel movement, if known:     Constitutional: appears stated age, cachectic, frail, NAD  Eyes: pupils equal, anicteric  ENMT: no nasal discharge, dry mucous membranes  Cardiovascular: regular rhythm, distal pulses intact  Respiratory: breathing not labored, symmetric  Gastrointestinal: soft non-tender, +bowel sounds  Musculoskeletal: no deformity, no tenderness to palpation  Skin: warm, dry  Neurologic: following simple  commands, weakly moving all extremities, speech seems to have improved,  more intelligible today  Psychiatric: appropriate affect, unable to assess for  hallucinations  Other:       HISTORY:     Principal Problem:    Acute CVA (cerebrovascular accident) ([facility] [address] (11/30/2021)    Active Problems:    Coronary atherosclerosis of native coronary artery (4/15/2013)      Overview: MI 12/26/12- [place]      Hypothyroidism (4/17/2013)      Elevated troponin (11/29/2021)      Dysarthria (11/29/2021)      Ischemic cardiomyopathy (11/30/2021)      Acute metabolic encephalopathy (01/42/5794)      Leukocytosis (11/30/2021)      Underweight (11/30/2021)      Past Medical History:   Diagnosis Date    Acute MI ([address]     Anxiety     CAD (coronary artery disease)     Hypertension       Past Surgical History:   Procedure Laterality Date    CARDIAC SURG PROCEDURE UNLIST      HX GYN      hysterectomy    HX ORTHOPAEDIC        Family History   Problem Relation Age of Onset    Other Unknown         unknown      History reviewed, no pertinent family history.   Social History     Tobacco Use    Smoking status: Never Smoker    Smokeless tobacco: Not on file   Substance Use Topics    Alcohol use: No     No Known Allergies   Current Facility-Administered Medications   Medication Dose Route Frequency    levothyroxine (SYNTHROID) tablet 100 mcg  100 mcg Oral ACB    heparin (porcine) 1,000 unit/mL injection        acetaminophen (TYLENOL) tablet 650 mg  650 mg Oral Q6H    metroNIDAZOLE (FLAGYL) tablet 500 mg  500 mg Oral Q12H    cefepime (MAXIPIME) 2 g in sterile water (preservative free) 10 mL IV syringe  2 g IntraVENous Q24H    atorvastatin (LIPITOR) tablet 10 mg  10 mg Oral DAILY    acetaminophen (TYLENOL) tablet 650 mg  650 mg Oral Q4H PRN    Or    acetaminophen (TYLENOL) solution 650 mg  650 mg Per NG tube Q4H PRN    Or    acetaminophen (TYLENOL) suppository 650 mg  650 mg Rectal Q4H PRN    aspirin tablet 325 mg  325 mg Oral DAILY    heparin (porcine) injection 5,000 Units  5,000 Units SubCUTAneous Q8H          LAB AND IMAGING FINDINGS:     Lab Results   Component Value Date/Time    WBC 34.2 (H) 12/02/2021 03:40 AM    HGB 15.2 12/02/2021 03:40 AM    PLATELET 618 88/03/2398 03:40 AM     Lab Results   Component Value Date/Time    Sodium 142 12/02/2021 03:40 AM    Potassium 4.7 12/02/2021 03:40 AM    Chloride 112 (H) 12/02/2021 03:40 AM    CO2 20 (L) 12/02/2021 03:40 AM    BUN 49 (H) 12/02/2021 03:40 AM    Creatinine 1.26 (H) 12/02/2021 03:40 AM    Calcium 8.6 12/02/2021 03:40 AM    Magnesium 2.0 12/02/2021 03:40 AM    Phosphorus 3.7 04/16/2013 01:10 AM      Lab Results   Component Value Date/Time    Alk. phosphatase 98 11/30/2021 10:13 AM    Protein, total 7.0 11/30/2021 10:13 AM    Albumin 2.6 (L) 11/30/2021 10:13 AM    Globulin 4.4 (H) 11/30/2021 10:13 AM     Lab Results   Component Value Date/Time    INR 1.2 (H) 04/16/2013 01:10 AM    Prothrombin time 12.4 (H) 04/16/2013 01:10 AM    aPTT 31.5 04/16/2013 01:10 AM      No results found for: IRON, FE, TIBC, IBCT, PSAT, FERR   No results found for: PH, PCO2, PO2  No components found for: [place]   Lab Results   Component Value Date/Time     11/29/2021 12:45 PM    CK - MB 0.9 04/16/2013 01:10 AM                Total time: 15 min  Counseling / coordination time, spent as noted above: 10min  > 50% counseling / coordination?: yes    Prolonged service was provided for  []30 min   []75 min in face to face time in the presence of the patient, spent as noted above. Time Start:   Time End:   Note: this can only be billed with 13412 (initial) or 93443 (follow up). If multiple start / stop times, list each separately.

## 2021-12-02 NOTE — PROGRESS NOTES
Physician Progress Note      PATIENT:               Ellis Montemayor  CSN #:                  [id number]  :                       1928  ADMIT DATE:       2021 12:27 PM  [address] [place] DATE:  RESPONDING  PROVIDER #:        Lisa Post MD          QUERY TEXT:    Dear Attending,    Patient admitted with CVA and noted documentation of underweight with BMI 17.51 with RD assessment 21. If possible, please document in progress notes and discharge summary if you are evaluating and /or treating any of the following: The medical record reflects the following:  Risk Factors: advanced age, BMI 17.51, poor appetite  Clinical Indicators:  RD- Malnutrition Status:  Severe malnutrition  Context:  Chronic illness  Body Fat Loss:  7 - Severe body fat loss, Orbital, Triceps, Fat overlying ribs, Buccal region  Muscle Mass Loss:  7 - Severe muscle mass loss, Clavicles (pectoralis &deltoids), Calf (gastrocnemius), Thigh (quadriceps), Scapula (trapezius)  Treatment: RD consult, Ensure Enlive at breakfast and dinner, Magic Cup at lunch, monitoring    ASPEN Criteria:  https://aspenjournals. onlinelibrary. slater. com/doi/full/10.1177/2483080507345845      Meli Moyer RN  [facility]  [phone]  Options provided:  -- Protein calorie malnutrition mild  -- Protein calorie malnutrition moderate  -- Protein calorie malnutrition severe  -- Other - I will add my own diagnosis  -- Disagree - Not applicable / Not valid  -- Disagree - Clinically unable to determine / Unknown  -- Refer to Clinical Documentation Reviewer    PROVIDER RESPONSE TEXT:    This patient has severe protein calorie malnutrition.     Query created by: Shin Gallegos on 2021 1:09 PM      Electronically signed by:  Lisa Post MD 2021 3:34 PM

## 2021-12-03 NOTE — PROGRESS NOTES
Comprehensive Nutrition Assessment    Type and Reason for Visit: Reassess    Nutrition Recommendations/Plan:   1. Continue pureed diet per SLP  2. Continue Magic Cup once daily to increase kcal/protein intake (290 kcal, 38 g carbs, 9 g protein)  3. Adjust Ensure to compact TID to increase concentration of kcal/protein per sip. 4. Consider IVF to help maintain hydration as PO is not sufficient. Nutrition Assessment:  12/3: Pt continues with very poor intake. Bites and sips noted, <25% on average intake documented. Pt discussed in IDR. Noted WBC scan indicated issue on lower leg? Awaiting more results. Pt to be on Abx through the weekend. Adjusted ONS to more concentrated supplement. Likely will continue to be insufficient and not meet protein or kcal needs. Recommend IVF as hydration needs not being met. 12/1: Follow up. Able to meet with patient and brother today. Brother states patient usually drinks 1 - 2 Boosts or Ensures per day, and usually consumes only cookies. Patient appears more alert today. Patient stated she liked her ice cream yesterday (Magic Cup). NFPE completed at this encounter. Brother states patient has not recently lost weight, but is unsure of UBW. Denies previous chewing/swallowing problems but patient is edentulous. 11/30: Pt is a 80year old female admitted with Altered mental state [R41.82]  Elevated troponin [R77.8]  Dysarthria [R47.1]. She  has a past medical history of Acute MI ([address] Anxiety, CAD (coronary artery disease), and Hypertension. RD consulted for poor appetite and wounds. Off floor at time of RD visit. SLP recommended puree diet with thin liquids - patient oriented to person only per notes. Unable to contact NOK. RD to follow up at a later date in attempt to gain further hx. No noted N/V/D at this time. NKFA.     Wt Readings from Last 10 Encounters:   12/03/21 44.5 kg (98 lb 1.7 oz)   04/18/13 55.3 kg (122 lb)     Patient Vitals for the past 168 hrs:   % Diet Eaten   12/02/21 1300 1 - 25%   12/02/21 0830 1 - 25%   12/01/21 1843 1 - 25%   12/01/21 0852 1 - 25%   11/30/21 2145 0%   11/30/21 1817 0%   11/30/21 1444 1 - 25%   11/30/21 1047 1 - 25%     Last 3 Recorded Weights in this Encounter    11/30/21 1441 12/02/21 0319 12/03/21 0028   Weight: 46.3 kg (102 lb 1.2 oz) 48.6 kg (107 lb 2.3 oz) 44.5 kg (98 lb 1.7 oz)       Malnutrition Assessment:  Malnutrition Status:  Severe malnutrition    Context:  Chronic illness     Findings of the 6 clinical characteristics of malnutrition:   Energy Intake:  No significant decrease in energy intake  Weight Loss:  Unable to assess     Body Fat Loss:  7 - Severe body fat loss, Orbital, Triceps, Fat overlying ribs, Buccal region   Muscle Mass Loss:  7 - Severe muscle mass loss, Clavicles (pectoralis &deltoids), Calf (gastrocnemius), Thigh (quadriceps), Scapula (trapezius)  Fluid Accumulation:  No significant fluid accumulation,     Strength:  Not performed     Estimated Daily Nutrient Needs:  Energy (kcal): 4353-0089 (25-30); Weight Used for Energy Requirements: Current  Protein (g): 46-55 (1.0-1.2); Weight Used for Protein Requirements: Current  Fluid (ml/day): 8866-9702; Method Used for Fluid Requirements: 1 ml/kcal    Nutrition Related Findings:       Last BM: PTA  Edema: none documented    Nutr. Labs:  Lab Results   Component Value Date/Time    GFR est AA 48 (L) 12/02/2021 03:40 AM    GFR est non-AA 40 (L) 12/02/2021 03:40 AM    Creatinine 1.26 (H) 12/02/2021 03:40 AM    BUN 49 (H) 12/02/2021 03:40 AM    Sodium 142 12/02/2021 03:40 AM    Potassium 4.7 12/02/2021 03:40 AM    Chloride 112 (H) 12/02/2021 03:40 AM    CO2 20 (L) 12/02/2021 03:40 AM     Lab Results   Component Value Date/Time    Glucose 90 12/02/2021 03:40 AM     Lab Results   Component Value Date/Time    Hemoglobin A1c 5.7 (H) 11/30/2021 02:23 AM       Nutr.  Meds:  Lipitor, Doxy, Synthroid,     Wounds:    Per wound care note - Stage 1 POA inner right buttocks Current Nutrition Therapies:  ADULT DIET Dysphagia - Pureed  ADULT ORAL NUTRITION SUPPLEMENT Lunch; Frozen Supplement  ADULT ORAL NUTRITION SUPPLEMENT Dinner, Breakfast; Standard High Calorie/High Protein    Anthropometric Measures:  · Height:  5' 4.02\" (162.6 cm)  · Current Body Wt:  46.3 kg (102 lb 1.2 oz)   · Ideal Body Wt:  120 lbs:  85.1 %   Body mass index is 16.83 kg/m². · BMI Category:  Underweight (BMI less than 22) age over 72       Nutrition Diagnosis:   · Severe malnutrition related to inadequate protein-energy intake as evidenced by BMI, wounds, severe loss of subcutaneous fat, severe muscle loss    Nutrition Interventions:   Food and/or Nutrient Delivery: Continue current diet, Modify oral nutrition supplement  Nutrition Education and Counseling: No recommendations at this time  Coordination of Nutrition Care: Continue to monitor while inpatient, Interdisciplinary rounds    Goals:  PO intake >/= 80% estimated protein needs within 2 - 3 days       Nutrition Monitoring and Evaluation:   Behavioral-Environmental Outcomes: None identified  Food/Nutrient Intake Outcomes: Food and nutrient intake, Supplement intake  Physical Signs/Symptoms Outcomes: Biochemical data, Skin, Weight    Discharge Planning:     Too soon to determine     Electronically signed by Moon Delarosa RD on 12/3/2021  Contact: [phone] or via [employer]

## 2021-12-03 NOTE — ROUTINE PROCESS
SPEECH LANGUAGE PATHOLOGY DYSPHAGIA TREATMENT  Patient: Joanie Seip (54 y.o. female)  Date: 12/3/2021  Diagnosis: Altered mental state [R41.82]  Elevated troponin [R77.8]  Dysarthria [R47.1] Acute CVA (cerebrovascular accident) [facility])      Precautions: aspiration Fall, Bed Alarm, DNR, Skin    ASSESSMENT:  Patient tolerating purees, thins and pills well. Taking minimal PO. PLAN:  Recommendations and Planned Interventions:  Continue purees, thins. Continue supplements. Patient continues to benefit from skilled intervention to address the above impairments. Continue treatment per established plan of care. Discharge Recommendations: To Be Determined     SUBJECTIVE:   Patient stated My tailbone hurts, my feet hurt. I need some cream on my face and my feet . OBJECTIVE:   Cognitive and Communication Status:  Neurologic State: Alert  Orientation Level: Oriented to person, Oriented to place  Cognition: Follows commands  Perception: Cues to maintain midline in sitting, Cues to maintain midline in standing, Tactile, Verbal, Visual  Perseveration: No perseveration noted  Safety/Judgement: Awareness of environment, Decreased awareness of need for assistance, Decreased awareness of need for safety, Decreased insight into deficits, Fall prevention  Dysphagia Treatment:  Oral Assessment:     P.O. Trials:  Patient Position: up in bed. c/o tailbone pain, foot pain  Vocal quality prior to P.O.: No impairment  Consistency Presented: Pudding;  Thin liquid  How Presented: Self-fed/presented; Spoon; Straw; Successive swallows   ORAL PHASE:   Bolus Acceptance: No impairment  Bolus Formation/Control: No impairment  Type of Impairment: Anterior  Propulsion: No impairment  Oral Residue: None   PHARYNGEAL PHASE:   Initiation of Swallow: No impairment  Laryngeal Elevation: Functional  Aspiration Signs/Symptoms: None  Pharyngeal Phase Characteristics: No impairment, issues, or problems                    Exercises:  Laryngeal Exercises:                                                                           RN states patient taking pills well. Pain:  Pain Scale 1: Numeric (0 - 10)  Pain Intensity 1: 1  Pain Location 1: Back    After treatment:   Patient left in no apparent distress in bed and Nursing notified    COMMUNICATION/EDUCATION:   Patient was educated regarding her deficit(s) of DYSPHAGIA as this relates to her diagnosis of CVA. She demonstrated Good understanding as evidenced by DISCUSSION. The patient's plan of care including recommendations, planned interventions, and recommended diet changes were discussed with: Registered nurse.      Neo Friedman SLP  Time Calculation: 10 mins

## 2021-12-03 NOTE — PROGRESS NOTES
Care Management follow up     Patient admitted for Altered mental status, slurred speech, acute CVA, EF 15-20%. Hx Cardiomyopathy with EF 30%, HTN, CAD.     COVID19 Vaccine:  no        RUR 15 (Score %) moderate    Is This a Readmission NO  Is this a Bundle NO     Current status  Patient discussed during interdisciplinary rounds. Patient continues to require medical management including IV antibiotics. Ongoing assessment and monitoring. Cardiology, neurology, ID, palliative following. PT/OT recommending SNF placement. Referrals placed to [facility], and [facility] Declined by Ken Dennis (unvaccinated). Will continue to monitor SNF referrals.     Transition of Care Plan  · Monitor patient status and response to treatment. · Patient continues to require medical management. · SNF referrals sent. · CM to monitor progress and recommendations.     Cristel Avina RN, MSN/Care manager  [phone]

## 2021-12-03 NOTE — PROGRESS NOTES
0730- Bedside and Verbal shift change report given to Zaid Hastings RN (oncoming nurse) by Angelo Biggs RN (offgoing nurse). Report included the following information SBAR, Kardex, ED Summary, OR Summary, Procedure Summary, Intake/Output, MAR, Accordion, and Recent Results. This patient was assisted with Intentional Toileting every 2 hours during this shift. Documentation of ambulation and output reflected on Flowsheet. TRANSFER - OUT REPORT:    Verbal report given to Urbano Marte RN(name) on [facility]  being transferred to [facility](unit) for routine progression of care       Report consisted of patients Situation, Background, Assessment and   Recommendations(SBAR). Information from the following report(s) SBAR, Kardex, ED Summary, OR Summary, Procedure Summary, Intake/Output, MAR, Accordion and Recent Results was reviewed with the receiving nurse. Lines:   Peripheral IV 11/29/21 Left Antecubital (Active)   Site Assessment Clean, dry, & intact 12/03/21 1536   Phlebitis Assessment 0 12/03/21 1536   Infiltration Assessment 0 12/03/21 1536   Dressing Status Clean, dry, & intact 12/03/21 1536   Dressing Type Transparent 12/03/21 1536   Hub Color/Line Status Capped 12/03/21 1536   Action Taken Open ports on tubing capped 12/03/21 1536   Alcohol Cap Used Yes 12/03/21 1536       Peripheral IV 11/30/21 Distal; Right (Active)   Site Assessment Clean, dry, & intact 12/03/21 1536   Phlebitis Assessment 0 12/03/21 1536   Infiltration Assessment 0 12/03/21 1536   Dressing Status Clean, dry, & intact 12/03/21 1536   Dressing Type Transparent 12/03/21 1536   Hub Color/Line Status Capped 12/03/21 1536   Action Taken Open ports on tubing capped 12/03/21 1536   Alcohol Cap Used Yes 12/03/21 1536        Opportunity for questions and clarification was provided.       Patient transported with:   Monitor  Registered Nurse  Tech

## 2021-12-03 NOTE — PROGRESS NOTES
Roderick Adkins [facility] [address]  [address], [place], [address] [facility]  [phone]      Medical Progress Note      NAME:         Terrell Smoker   :        1928  MRM:        [id number]    Date of service: 12/3/2021      Subjective: Patient has been seen and examined as a follow up for multiple medical issues. Chart, labs, diagnostics reviewed. She remains weak and says she has some back pain. She remains alert. No fever or chills. No nausea or vomiting. Objective:    Vital Signs:    Visit Vitals  /81 (BP 1 Location: Left upper arm, BP Patient Position: At rest)   Pulse 71   Temp 97.2 °F (36.2 °C)   Resp 18   Ht 5' 4.02\" (1.626 m)   Wt 44.5 kg (98 lb 1.7 oz)   SpO2 94%   BMI 16.83 kg/m²          Intake/Output Summary (Last 24 hours) at 12/3/2021 0856  Last data filed at 12/3/2021 0646  Gross per 24 hour   Intake 330 ml   Output 600 ml   Net -270 ml        Physical Examination:    General:   Weak, cachectic and frail looking, not in acute distress    Eyes:   pink conjunctivae, PERRLA with no discharge. ENT:   no ottorrhea or rhinorrhea with dry mucous membranes  Neck: no masses, thyroid non-tender and trachea central.  Pulm: decreased but clear breath sounds without crackles or wheezes  Card:  has regular and normal S1, S2 without thrills, bruits or peripheral edema  Abd:  Soft, non-tender, non-distended, normoactive bowel sounds   Musc:  No cyanosis, clubbing, atrophy or deformities. No calf tenderness  Skin:  No rashes, bruising. Redness over sacral bony prominence. No overt ulcers or wounds. Neuro: Awake and alert, less confused.  Mildly dysarthric, generally a non focal exam. Limited exam.   Psych:  Has little insight to her illness at this time     Current Facility-Administered Medications   Medication Dose Route Frequency    levothyroxine (SYNTHROID) tablet 100 mcg  100 mcg Oral ACB    acetaminophen (TYLENOL) tablet 650 mg  650 mg Oral Q6H    metroNIDAZOLE (FLAGYL) tablet 500 mg  500 mg Oral Q12H    cefepime (MAXIPIME) 2 g in sterile water (preservative free) 10 mL IV syringe  2 g IntraVENous Q24H    atorvastatin (LIPITOR) tablet 10 mg  10 mg Oral DAILY    acetaminophen (TYLENOL) tablet 650 mg  650 mg Oral Q4H PRN    Or    acetaminophen (TYLENOL) solution 650 mg  650 mg Per NG tube Q4H PRN    Or    acetaminophen (TYLENOL) suppository 650 mg  650 mg Rectal Q4H PRN    aspirin tablet 325 mg  325 mg Oral DAILY    heparin (porcine) injection 5,000 Units  5,000 Units SubCUTAneous Q8H        Laboratory data and review:    Recent Labs     12/03/21  0532 12/02/21  0340 12/01/21  0103   WBC 28.7* 34.2* 34.3*   HGB 15.3 15.2 15.3   HCT 49.6* 50.5* 50.9*    227 364     Recent Labs     12/02/21  0340 12/01/21  0103 11/30/21  1013    147* 143   K 4.7 4.3 4.3   * 115* 111*   CO2 20* 22 22   GLU 90 112* 100   BUN 49* 52* 52*   CREA 1.26* 1.31* 1.48*   CA 8.6 8.0* 9.3   MG 2.0  --   --    ALB  --   --  2.6*   ALT  --   --  31     No components found for: [place]    Diagnostics: Imaging studies have been reviewed    Telemetry reviewed by me:   normal sinus rhythm    Assessment and Plan:    Leukocytosis (11/30/2021) POA: unclear as to why she has a persistent leukocytosis despite empiric IV antibiotics. CT scans chest, abdomen and pelvis unremarkable for any obvious focus of infection. No obvious wounds. Blood cultures neg. Peripheral smear shows a reactive picture. Lactate trending down. WBCs scan otherwise unremarkable other than a left popliteal uptake. No focal tenderness on exam. WBCs trending down. Continue empiric IV Cefepime and Metronidazole. No IV fluids given her low EF. ID following. Acute CVA (cerebrovascular accident) ([address] (11/30/2021) / Dysarthria (11/29/2021) POA: MRi brain showed several small foci of acute infarction in the cerebellar vermis, left occipital lobe, and left parietal lobe. Doppler carotids pending. , A1c 5.7. Echo showed severe aortic sclerosis, EF 15-20%. No shunt. Seen by neurology. Continue Asprin, Lipitor, PT, OT and speech therapies. CM to arrange for SNF. Palliative care following. DNR     Coronary atherosclerosis of native coronary artery / Elevated troponin (11/29/2021) /  Ischemic cardiomyopathy (11/30/2021) POA: Hx CAD MI 12/26/12- NC. Now with a mild troponin elevation. No overt ischemia. Had an episode of NSVT yesterday. Lytes OK. Echo showed a reduced EF of 15-20% with valvular disease. Continue Asprin, Lipitor. Acute metabolic encephalopathy (85/15/2682) POA: likely due to the CVA vs suspected occult infection vs underlying dementia. Continue to monitor her clinical progress     Hypothyroidism (4/17/2013) POA: TSH is normal. Unclear if she is taking any medications. Monitor for now     Underweight (11/30/2021) POA: due to advanced age, likely poor intake.  Diet as tolerated when able    Total time spent for the patient's care: [facility] discussed with: Patient, Care Manager and Nursing Staff    Discussed:  Care Plan and D/C Planning    Prophylaxis:  Hep SQ    Anticipated Disposition:  SNF/LTC when stable           ___________________________________________________    Attending Physician:   Angeles Aj MD

## 2021-12-03 NOTE — PROGRESS NOTES
[facility] Progress Note           Luis Alberts DO    [phone] Office  [phone]  Fax    12/3/2021      Assessment & Plan:   1. Leukocytosis. Etiology unclear. Remains afebrile since the time of admission. Blood culture NGSF and UA bland. CT head, C-A-P negative for acute infectious process. No diarrhea to suggest C. difficile. No significant wounds. Not on steroids. Procalcitonin not elevated. Whole-body tagged white blood cell scan with diffuse pulmonary uptake and focal uptake in the left popliteal region. Physical examination of the left popliteal fossa/knee is unremarkable. Patient breathing comfortably on room air with no pulmonary complaints. Will narrow antibiotics to doxycycline 100 mg p.o. twice daily x7 days and check respiratory virus panel. 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 With no fevers and sterile blood cultures low suspicion for vegetation. No TUNDE planned. Discussed with cardiology  · Acute CVA. Neurology following  · Ischemic cardiomyopathy    I will be out of town through 2021. Please contact ID physician on call with questions in the interim          Subjective:     No complaints    Objective:     Vitals:   Visit Vitals  /81 (BP 1 Location: Left upper arm, BP Patient Position: At rest)   Pulse 71   Temp 97.2 °F (36.2 °C)   Resp 18   Ht 5' 4.02\" (1.626 m)   Wt 98 lb 1.7 oz (44.5 kg)   SpO2 94%   BMI 16.83 kg/m²        Tmax:  Temp (24hrs), Av.5 °F (36.4 °C), Min:97.2 °F (36.2 °C), Max:97.7 °F (36.5 °C)      Exam:   Patient is intubated:  no    Physical Examination:   General:  Alert, cooperative, no distress   Head:  Normocephalic, atraumatic. Eyes:  Conjunctivae clear   Neck: Supple       Lungs:   No distress.   Scattered crackles   Chest wall:     Heart:     Abdomen: non-distended   Extremities: Moves all. Left knee/popliteal fossa unremarkable   Skin:  No rash   Neurologic: CNII-XII intact. Normal strength     Labs:        No lab exists for component: ITNL   No results for input(s): CPK, CKMB, TROIQ in the last 72 hours. Recent Labs     12/03/21  0532 12/02/21  0340 12/01/21  0103 11/30/21  1013 11/30/21  1013   NA  --  142 147*  --  143   K  --  4.7 4.3  --  4.3   CL  --  112* 115*  --  111*   CO2  --  20* 22  --  22   BUN  --  49* 52*  --  52*   CREA  --  1.26* 1.31*  --  1.48*   GLU  --  90 112*  --  100   MG  --  2.0  --   --   --    ALB  --   --   --   --  2.6*   WBC 28.7* 34.2* 34.3*   < > 30.2*   HGB 15.3 15.2 15.3   < > 16.9*   HCT 49.6* 50.5* 50.9*   < > 55.6*    227 364   < > 391    < > = values in this interval not displayed. No results for input(s): INR, PTP, APTT, INREXT, INREXT in the last 72 hours.   Needs: urine analysis, urine sodium, protein and creatinine  No results found for: LETICIA, CREAU      Cultures:     No results found for: SDES  Lab Results   Component Value Date/Time    Culture result: NO GROWTH 2 DAYS 12/01/2021 12:21 PM    Culture result: NO GROWTH 4 DAYS 11/29/2021 01:55 PM       Radiology:     Medications       Current Facility-Administered Medications   Medication Dose Route Frequency Last Admin    levothyroxine (SYNTHROID) tablet 100 mcg  100 mcg Oral  mcg at 12/03/21 0517    acetaminophen (TYLENOL) tablet 650 mg  650 mg Oral Q6H 650 mg at 12/03/21 0516    metroNIDAZOLE (FLAGYL) tablet 500 mg  500 mg Oral Q12H 500 mg at 12/03/21 0926    cefepime (MAXIPIME) 2 g in sterile water (preservative free) 10 mL IV syringe  2 g IntraVENous Q24H 2 g at 12/02/21 1210    atorvastatin (LIPITOR) tablet 10 mg  10 mg Oral DAILY 10 mg at 12/03/21 0926    acetaminophen (TYLENOL) tablet 650 mg  650 mg Oral Q4H  mg at 11/30/21 1239    Or    acetaminophen (TYLENOL) solution 650 mg  650 mg Per NG tube Q4H PRN      Or    acetaminophen (TYLENOL) suppository 650 mg  650 mg Rectal Q4H PRN      aspirin tablet 325 mg  325 mg Oral DAILY 325 mg at 12/03/21 0926    heparin (porcine) injection 5,000 Units  5,000 Units SubCUTAneous Q8H 5,000 Units at 12/03/21 9273           Case discussed with:      Luis Alberts DO

## 2021-12-04 NOTE — PROGRESS NOTES
Received information that [facility] patient in addition to [facility]. [facility] has not yet responded. Message left for the admissions director. Hematology consult Monday and Palliative care is following. Transition of Care  RUR  15% Moderate  1- continue to monitor clinical progress and coordinate discharge plan  2- work with family on increasing list of preferences for SNF  3- coordinate with family transportation at discharge.

## 2021-12-04 NOTE — PROGRESS NOTES
Roderick Adkins [facility] [address]  [address] [facility], [place], [address] [place] [address]  [phone]      Medical Progress Note      NAME:         Kelsey Parks   :        1928  MRM:        [id number]    Date of service: 2021      Subjective: Patient has been seen and examined as a follow up for multiple medical issues. Chart, labs, diagnostics reviewed. F/u acute CVA. Frequent episodes of non sustained SVT. Denies any focal weakness, no cp or dyspnea. Objective:    Vital Signs:    Visit Vitals  /84   Pulse 75   Temp 98.3 °F (36.8 °C)   Resp 19   Ht 5' 4.02\" (1.626 m)   Wt 43.3 kg (95 lb 7.4 oz)   SpO2 91%   BMI 16.38 kg/m²          Intake/Output Summary (Last 24 hours) at 2021 1539  Last data filed at 2021 0615  Gross per 24 hour   Intake 220 ml   Output 0 ml   Net 220 ml        Physical Examination:    General:   Weak, cachectic and frail looking, not in acute distress    Eyes:   pink conjunctivae, PERRLA with no discharge. ENT:   no ottorrhea or rhinorrhea with dry mucous membranes  Neck: no masses, thyroid non-tender and trachea central.  Pulm: decreased but clear breath sounds without crackles or wheezes  Card:  has regular and normal S1, S2 without thrills, bruits or peripheral edema  Abd:  Soft, non-tender, non-distended, normoactive bowel sounds   Musc:  No cyanosis, clubbing, atrophy or deformities. No calf tenderness  Skin:  No rashes, bruising. Redness over sacral bony prominence.    Neuro: Awake, dysarthric, generally a non focal exam  Psych:  Poor insight     Current Facility-Administered Medications   Medication Dose Route Frequency    metoprolol (LOPRESSOR) injection 2.5 mg  2.5 mg IntraVENous Q6H    doxycycline (VIBRA-TABS) tablet 100 mg  100 mg Oral Q12H    levothyroxine (SYNTHROID) tablet 100 mcg  100 mcg Oral ACB    acetaminophen (TYLENOL) tablet 650 mg  650 mg Oral Q6H    atorvastatin (LIPITOR) tablet 10 mg  10 mg Oral DAILY    acetaminophen (TYLENOL) tablet 650 mg  650 mg Oral Q4H PRN    Or    acetaminophen (TYLENOL) solution 650 mg  650 mg Per NG tube Q4H PRN    Or    acetaminophen (TYLENOL) suppository 650 mg  650 mg Rectal Q4H PRN    aspirin tablet 325 mg  325 mg Oral DAILY    heparin (porcine) injection 5,000 Units  5,000 Units SubCUTAneous Q8H        Laboratory data and review:    Recent Labs     12/04/21  0950 12/03/21  0532 12/02/21  0340   WBC 29.2* 28.7* 34.2*   HGB 15.7 15.3 15.2   HCT 50.5* 49.6* 50.5*   * 392 227     Recent Labs     12/04/21  0950 12/02/21  0340    142   K 4.0 4.7   * 112*   CO2 25 20*   * 90   BUN 39* 49*   CREA 1.09* 1.26*   CA 8.8 8.6   MG 2.2 2.0     No components found for: [place]    Diagnostics: Imaging studies have been reviewed      Assessment and Plan:    Leukocytosis (11/30/2021) POA: unclear as to why she has a persistent leukocytosis despite empiric IV antibiotics. CT scans chest, abdomen and pelvis unremarkable for any obvious focus of infection. No obvious wounds. Blood cultures neg. Peripheral smear shows a reactive picture. Lactate resolved. WBCs scan otherwise unremarkable other than a left popliteal uptake. No focal tenderness on exam. WBCs trended down overall but remains high. Continue empiric IV Cefepime and Metronidazole. No IV fluids given her low EF. ID following. Will likely need hematology consult on Monday if does not improve. Acute CVA (cerebrovascular accident) ([facility] [address] (11/30/2021) / Dysarthria (11/29/2021) POA: MRi brain showed several small foci of acute infarction in the cerebellar vermis, left occipital lobe, and left parietal lobe. Doppler carotids w/ right moderate stenosis and left mild stenosis. , A1c 5.7. Echo showed severe aortic sclerosis, EF 15-20%. No shunt. Seen by neurology. Continue Asprin, Lipitor, PT, OT and speech therapies. CM to arrange for SNF. Palliative care following.  DNR     Coronary atherosclerosis of native coronary artery / Elevated troponin (11/29/2021) /  Ischemic cardiomyopathy (11/30/2021) POA: Hx CAD MI 12/26/12- NC. Now with a mild troponin elevation. No overt ischemia. Echo showed a reduced EF of 15-20% with valvular disease. With frequent non sustained SVT. Monitor lytes and replete PRN. Add IV lopressor. Continue Asprin, Lipitor. Consult cardiology. Acute metabolic encephalopathy (03/49/4374) POA: likely due to the CVA vs suspected occult infection vs underlying dementia. Continue to monitor her clinical progress     Hypothyroidism (4/17/2013) POA: TSH is normal. Unclear if she is taking any medications. Monitor for now     Underweight (11/30/2021) POA: due to advanced age, likely poor intake.  Diet as tolerated when able    Total time spent for the patient's care: Jade discussed with: Patient, Care Manager and Nursing Staff    Discussed:  Care Plan and D/C Planning    Prophylaxis:  Hep SQ    Anticipated Disposition:  SNF/LTC when stable           ___________________________________________________    Attending Physician:   Tyra Montero DO

## 2021-12-04 NOTE — PROGRESS NOTES
Bedside and Verbal shift change report given to Sharif Gutierrez (oncoming nurse) by Zabrina Burdick (offgoing nurse). Report included the following information SBAR, Kardex and Cardiac Rhythm Sinus Arrythmia.

## 2021-12-04 NOTE — PROGRESS NOTES
Paged Dr Yarelis Redmond via Alec Salvador \"Pt is having runs of SVT approximately every 20 min per report from tele\"

## 2021-12-05 NOTE — PROGRESS NOTES
SUBJECTIVE      Noted to have PACs/PVC and a triplet run of NSVT      ACTIVE PROBLEM LIST     Patient Active Problem List    Diagnosis Date Noted    Acute CVA (cerebrovascular accident) [address] [address] 11/30/2021    Ischemic cardiomyopathy 11/30/2021    Acute metabolic encephalopathy 33/99/2089    Leukocytosis 11/30/2021    Underweight 11/30/2021    Elevated troponin 11/29/2021    Dysarthria 11/29/2021    Hypothyroidism 05/28/2882    [facility] failure ([facility] 04/15/2013    Unspecified hypertensive heart disease with heart failure(402.91) 04/15/2013    Coronary atherosclerosis of native coronary artery 04/15/2013    Anxiety state, unspecified 04/15/2013          MEDICATIONS     Current Facility-Administered Medications   Medication Dose Route Frequency    metoprolol (LOPRESSOR) injection 2.5 mg  2.5 mg IntraVENous Q6H    doxycycline (VIBRA-TABS) tablet 100 mg  100 mg Oral Q12H    levothyroxine (SYNTHROID) tablet 100 mcg  100 mcg Oral ACB    acetaminophen (TYLENOL) tablet 650 mg  650 mg Oral Q6H    atorvastatin (LIPITOR) tablet 10 mg  10 mg Oral DAILY    acetaminophen (TYLENOL) tablet 650 mg  650 mg Oral Q4H PRN    Or    acetaminophen (TYLENOL) solution 650 mg  650 mg Per NG tube Q4H PRN    Or    acetaminophen (TYLENOL) suppository 650 mg  650 mg Rectal Q4H PRN    aspirin tablet 325 mg  325 mg Oral DAILY    heparin (porcine) injection 5,000 Units  5,000 Units SubCUTAneous Q8H          PAST MEDICAL HISTORY     Past Medical History:   Diagnosis Date    [facility] ([facility]     Anxiety     CAD (coronary artery disease)     Hypertension            PAST SURGICAL HISTORY     Past Surgical History:   Procedure Laterality Date    CARDIAC SURG PROCEDURE UNLIST      HX GYN      hysterectomy    HX ORTHOPAEDIC            ALLERGIES     No Known Allergies       FAMILY HISTORY     Family History   Problem Relation Age of Onset    Other Unknown         unknown    negative for cardiac disease     SOCIAL HISTORY     Social History     Socioeconomic History    Marital status:    Tobacco Use    Smoking status: Never Smoker   Substance and Sexual Activity    Alcohol use: No    Drug use: No       MEDICATIONS     Current Facility-Administered Medications   Medication Dose Route Frequency    metoprolol (LOPRESSOR) injection 2.5 mg  2.5 mg IntraVENous Q6H    doxycycline (VIBRA-TABS) tablet 100 mg  100 mg Oral Q12H    levothyroxine (SYNTHROID) tablet 100 mcg  100 mcg Oral ACB    acetaminophen (TYLENOL) tablet 650 mg  650 mg Oral Q6H    atorvastatin (LIPITOR) tablet 10 mg  10 mg Oral DAILY    acetaminophen (TYLENOL) tablet 650 mg  650 mg Oral Q4H PRN    Or    acetaminophen (TYLENOL) solution 650 mg  650 mg Per NG tube Q4H PRN    Or    acetaminophen (TYLENOL) suppository 650 mg  650 mg Rectal Q4H PRN    aspirin tablet 325 mg  325 mg Oral DAILY    heparin (porcine) injection 5,000 Units  5,000 Units SubCUTAneous Q8H       I have reviewed the nurses notes, vitals, problem list, allergy list, medical history, family, social history and medications. REVIEW OF SYMPTOMS      General: Pt denies excessive weight gain or loss. Pt is able to conduct ADL's  HEENT: Denies blurred vision, headaches, hearing loss, epistaxis and difficulty swallowing. Respiratory: Denies cough, congestion, shortness of breath, CASTELLANO, wheezing or stridor. Cardiovascular: Denies precordial pain, palpitations, edema or PND  Gastrointestinal: Denies poor appetite, indigestion, abdominal pain or blood in stool  Genitourinary: Denies hematuria, dysuria, increased urinary frequency  Musculoskeletal: Denies joint pain or swelling from muscles or joints  Neurologic: Denies tremor, paresthesias, headache, or sensory motor disturbance  Psychiatric: Denies confusion, insomnia, depression  Integumentray: Denies rash, itching or ulcers.   Hematologic: Denies easy bruising, bleeding       PHYSICAL EXAMINATION      Vitals:    12/05/21 0018 12/05/21 0414 12/05/21 0724 12/05/21 0802   BP: (!) 151/89 (!) 146/91  126/84   Pulse: 77 89 88 81   Resp: 18 18  18   Temp: 98.2 °F (36.8 °C) 97.8 °F (36.6 °C)  97.7 °F (36.5 °C)   SpO2: 94% 98%  93%   Weight:       Height:         General: Well developed, in no acute distress. HEENT: No jaundice, oral mucosa moist, no oral ulcers  Neck: Supple, no stiffness, no lymphadenopathy, supple  Heart:  Normal S1/S2 negative S3 or S4. Regular, no murmur, gallop or rub, no jugular venous distention  Respiratory: Clear bilaterally x 4, no wheezing or rales  Abdomen:   Soft, non-tender, bowel sounds are active.   Extremities:  No edema, normal cap refill, no cyanosis. Musculoskeletal: No clubbing, no deformities  Neuro: A&Ox3, speech clear, gait stable, cooperative, no focal neurologic deficits  Skin: Skin color is normal. No rashes or lesions. Non diaphoretic, moist.  Vascular: 2+ pulses symmetric in all extremities       DIAGNOSTIC DATA      TELEMETRY: SR with PACs/ PVC       LABORATORY DATA      Lab Results   Component Value Date/Time    WBC 28.7 (H) 12/05/2021 01:54 AM    HGB 17.4 (H) 12/05/2021 01:54 AM    HCT 56.9 (H) 12/05/2021 01:54 AM    PLATELET 079 (H) 50/68/4644 01:54 AM    MCV 76.2 (L) 12/05/2021 01:54 AM      Lab Results   Component Value Date/Time    Sodium 142 12/05/2021 09:46 AM    Potassium 4.1 12/05/2021 09:46 AM    Chloride 112 (H) 12/05/2021 09:46 AM    CO2 25 12/05/2021 09:46 AM    Anion gap 5 12/05/2021 09:46 AM    Glucose 102 (H) 12/05/2021 09:46 AM    BUN 40 (H) 12/05/2021 09:46 AM    Creatinine 1.03 (H) 12/05/2021 09:46 AM    BUN/Creatinine ratio 39 (H) 12/05/2021 09:46 AM    GFR est AA >60 12/05/2021 09:46 AM    GFR est non-AA 50 (L) 12/05/2021 09:46 AM    Calcium 9.1 12/05/2021 09:46 AM    Bilirubin, total 1.1 (H) 12/05/2021 09:46 AM    Alk.  phosphatase 154 (H) 12/05/2021 09:46 AM    Protein, total 6.4 12/05/2021 09:46 AM    Albumin 1.9 (L) 12/05/2021 09:46 AM    Globulin 4.5 (H) 12/05/2021 09:46 AM    A-G Ratio 0.4 (L) 12/05/2021 09:46 AM    ALT (SGPT) 33 12/05/2021 09:46 AM           ASSESSMENT      1. PACs  2. PVCs/NSVT  3. ICM  4. CVA  5. CAD  6. Hypothyroidism       PLAN     Tele shows predominantly PACs; occ/rare PVC/NSVT triplet. Toprol 12.5 mg daily.          Lennon Klinefelter, MD  Cardiac Electrophysiology / Cardiology    [address]  [facility], [address], [facility] [address] Siddharth Wheeler.    [facility], [facility]  [phone] / [phone] Fax   [phone] / [phone] Fax

## 2021-12-05 NOTE — PROGRESS NOTES
Roderick Adkins [facility]  [address] [facility], [place], [id number] [place] [address]  [phone]      Medical Progress Note      NAME:         Diego Cox   :        1928  MRM:        [id number]    Date of service: 2021      Subjective: Patient has been seen and examined as a follow up for multiple medical issues. Chart, labs, diagnostics reviewed. F/u acute CVA. Improved episodes of SVTs. Denies any focal weakness, no cp or dyspnea. Spoke with brother at bedside. Objective:    Vital Signs:    Visit Vitals  BP (!) 149/81 (BP 1 Location: Left upper arm, BP Patient Position: At rest)   Pulse 85   Temp 98.1 °F (36.7 °C)   Resp 18   Ht 5' 4.02\" (1.626 m)   Wt 43.3 kg (95 lb 7.4 oz)   SpO2 90%   BMI 16.38 kg/m²          Intake/Output Summary (Last 24 hours) at 2021 1510  Last data filed at 2021 1233  Gross per 24 hour   Intake 320 ml   Output    Net 320 ml        Physical Examination:    General:   Weak, cachectic and frail looking, not in acute distress    Eyes:   pink conjunctivae, PERRLA with no discharge. ENT:   no ottorrhea or rhinorrhea with dry mucous membranes  Neck: no masses, thyroid non-tender and trachea central.  Pulm: decreased but clear breath sounds without crackles or wheezes  Card:  has regular and normal S1, S2 without thrills, bruits or peripheral edema  Abd:  Soft, non-tender, non-distended, normoactive bowel sounds   Musc:  No cyanosis, clubbing, atrophy or deformities. No calf tenderness  Skin:  No rashes, bruising. Redness over sacral bony prominence.    Neuro: Awake, dysarthric, generally a non focal exam  Psych:  Poor insight     Current Facility-Administered Medications   Medication Dose Route Frequency    [START ON 2021] metoprolol succinate (TOPROL-XL) XL tablet 12.5 mg  12.5 mg Oral DAILY    doxycycline (VIBRA-TABS) tablet 100 mg  100 mg Oral Q12H    levothyroxine (SYNTHROID) tablet 100 mcg  100 mcg Oral ACB    acetaminophen (TYLENOL) tablet 650 mg  650 mg Oral Q6H    atorvastatin (LIPITOR) tablet 10 mg  10 mg Oral DAILY    acetaminophen (TYLENOL) tablet 650 mg  650 mg Oral Q4H PRN    Or    acetaminophen (TYLENOL) solution 650 mg  650 mg Per NG tube Q4H PRN    Or    acetaminophen (TYLENOL) suppository 650 mg  650 mg Rectal Q4H PRN    aspirin tablet 325 mg  325 mg Oral DAILY    heparin (porcine) injection 5,000 Units  5,000 Units SubCUTAneous Q8H        Laboratory data and review:    Recent Labs     12/05/21  0154 12/04/21  0950 12/03/21  0532   WBC 28.7* 29.2* 28.7*   HGB 17.4* 15.7 15.3   HCT 56.9* 50.5* 49.6*   * 460* 392     Recent Labs     12/05/21  0946 12/04/21  0950    144   K 4.1 4.0   * 112*   CO2 25 25   * 104*   BUN 40* 39*   CREA 1.03* 1.09*   CA 9.1 8.8   MG  --  2.2   ALB 1.9*  --    ALT 33  --      No components found for: [place]    Diagnostics: Imaging studies have been reviewed      Assessment and Plan:    Leukocytosis (11/30/2021) POA: unclear as to why she has a persistent leukocytosis despite empiric IV antibiotics. CT scans chest, abdomen and pelvis unremarkable for any obvious focus of infection. No obvious wounds. Blood cultures neg. Peripheral smear shows a reactive picture. Lactate resolved. WBCs scan otherwise unremarkable other than a left popliteal uptake. No focal tenderness on exam. WBCs trended down overall but remains high. S/p empiric IV Cefepime and Metronidazole; continue IV doxy. No IV fluids given her low EF. ID evaluted. Consult hematology given persistent elevated WBC. Acute CVA (cerebrovascular accident) ([address] (11/30/2021) / Dysarthria (11/29/2021) POA: MRi brain showed several small foci of acute infarction in the cerebellar vermis, left occipital lobe, and left parietal lobe. Doppler carotids w/ right moderate stenosis and left mild stenosis. , A1c 5.7. Echo showed severe aortic sclerosis, EF 15-20%. No shunt.  Seen by neurology. Continue Asprin, Lipitor, PT, OT and speech therapies. CM to arrange for SNF. Palliative care following. DNR     Coronary atherosclerosis of native coronary artery / Elevated troponin (11/29/2021) /  Ischemic cardiomyopathy (11/30/2021) POA: Hx CAD MI 12/26/12- [place]. Now with a mild troponin elevation. No overt ischemia. Echo showed a reduced EF of 15-20% with valvular disease. With frequent non sustained SVT. Monitor lytes and replete PRN. Change IV lopressor to oral toprol-xl. Continue Asprin, Lipitor. Cardiology following. Acute metabolic encephalopathy (54/83/6379) POA: likely due to the CVA vs suspected occult infection vs underlying dementia. Continue to monitor her clinical progress     Hypothyroidism (4/17/2013) POA: TSH is normal. Unclear if she is taking any medications. Monitor for now     Underweight (11/30/2021) POA: due to advanced age, likely poor intake.  Diet as tolerated when able    Total time spent for the patient's care: Jade discussed with: Patient, Care Manager and Nursing Staff    Discussed:  Care Plan and D/C Planning    Prophylaxis:  Hep SQ    Anticipated Disposition:  SNF/LTC when stable           ___________________________________________________    Attending Physician:   Chitra Subramanian DO

## 2021-12-05 NOTE — PROGRESS NOTES
Bedside and Verbal shift change report given to Patricia Rose RN (oncoming nurse) by Robert Santiago RN (offgoing nurse). Report included the following information SBAR, Kardex, ED Summary, Intake/Output, MAR, Accordion and Recent Results.

## 2021-12-06 NOTE — CONSULTS
[facility] [place] at [address], [address] [facility] [address] [facility]  W: [phone]  F: [phone]      Reason for Visit:   Angelia Daigle is a 80 y.o. female who is seen for evaluation of persistent elevated WBC. History of Present Illness:   Angelia Daigle is a pleasant 80 y.o. female with CAD, HTN admitted 21 with AMS, slurred speech, found to have acute CVA. ED labs notable for WBC 26.2, Hgb 16.8, MCV 75.7, , Creat 1.91, Tbili 1.3, Tpn-High sens 572, Lactic acid 4.6. ED CT head unrevealing, but Brain MRI revealed small foci on acute infarction in the cerebellar vermis, left occipital lobe and left parietal lobe. Neurology evaluated. ID was consulted for leukocytosis. Pt remained afebrile but WBC remains elevated despite broad spectrum abx. Imaging CT C/A/P negative for infectious process. Blood cultures NGTD. WBC tagged scan with diffuse pulmonary uptake and some uptake in left popliteal region. 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 Pt is a poor historian. States follows with Dr. Stuart Black. Able to states  and month is Dec but unsure of year. Denies any know hx of elevated WBC or any hx of blood problems or cancer. Spoke with nephew, Eulalio Jenkins and pt's brother, Odell via phone. He is unaware of any doctors other than pt's cardiologist that she went to on a regular basis. He states she was very independent and took care of her docs appts on her own. Now, patient is not eating or drinking well. She reports swallowing problems.        Past Medical History:   Diagnosis Date    Acute MI ([address]     Anxiety     CAD (coronary artery disease)     Hypertension        Past Surgical History:   Procedure Laterality Date    CARDIAC SURG PROCEDURE UNLIST      HX GYN      hysterectomy    HX ORTHOPAEDIC         Social History     Socioeconomic History    Marital status:      Spouse name: Not on file    Number of children: Not on file    Years of education: Not on file    Highest education level: Not on file   Occupational History    Not on file   Tobacco Use    Smoking status: Never Smoker    Smokeless tobacco: Not on file   Substance and Sexual Activity    Alcohol use: No    Drug use: No    Sexual activity: Not on file   Other Topics Concern    Not on file   Social History Narrative    Not on file     Social Determinants of Health     Financial Resource Strain:     Difficulty of Paying Living Expenses: Not on file   Food Insecurity:     Worried About Running Out of Food in the Last Year: Not on file    Helen of Food in the Last Year: Not on file   Transportation Needs:     Lack of Transportation (Medical): Not on file    Lack of Transportation (Non-Medical):  Not on file   Physical Activity:     Days of Exercise per Week: Not on file    Minutes of Exercise per Session: Not on file   Stress:     Feeling of Stress : Not on file   Social Connections:     Frequency of Communication with Friends and Family: Not on file    Frequency of Social Gatherings with Friends and Family: Not on file    Attends Episcopal Services: Not on file    Active Member of [address] [place] [address] or Organizations: Not on file    Attends Club or Organization Meetings: Not on file    Marital Status: Not on file   Intimate Partner Violence:     Fear of Current or Ex-Partner: Not on file    Emotionally Abused: Not on file    Physically Abused: Not on file    Sexually Abused: Not on file   Housing Stability:     Unable to Pay for Housing in the Last Year: Not on file    Number of [place] in the Last Year: Not on file    Unstable Housing in the Last Year: Not on file       Family History   Problem Relation Age of Onset    Other Unknown         unknown       Current Facility-Administered Medications   Medication Dose Route Frequency    metoprolol succinate (TOPROL-XL) XL tablet 12.5 mg  12.5 mg Oral DAILY    doxycycline (VIBRA-TABS) tablet 100 mg  100 mg Oral Q12H    levothyroxine (SYNTHROID) tablet 100 mcg  100 mcg Oral ACB    acetaminophen (TYLENOL) tablet 650 mg  650 mg Oral Q6H    atorvastatin (LIPITOR) tablet 10 mg  10 mg Oral DAILY    acetaminophen (TYLENOL) tablet 650 mg  650 mg Oral Q4H PRN    Or    acetaminophen (TYLENOL) solution 650 mg  650 mg Per NG tube Q4H PRN    Or    acetaminophen (TYLENOL) suppository 650 mg  650 mg Rectal Q4H PRN    aspirin tablet 325 mg  325 mg Oral DAILY    heparin (porcine) injection 5,000 Units  5,000 Units SubCUTAneous Q8H       No Known Allergies       Review of Systems: A complete review of systems was obtained, reviewed. Pertinent findings reviewed above. Physical Exam:     Visit Vitals  /74 (BP 1 Location: Left upper arm, BP Patient Position: At rest)   Pulse 78   Temp 97.7 °F (36.5 °C)   Resp 19   Ht 5' 4.02\" (1.626 m)   Wt 43.3 kg (95 lb 7.4 oz)   SpO2 91%   BMI 16.38 kg/m²     ECOG PS: 3-4  General: elderly, frail, cachetic, no acute distress  Eyes: PERRLA, anicteric sclerae, EOMI  HENT: atraumatic, oropharynx clear  Neck: supple, no JVD or thyromegaly  Lymphatic: no cervical, supraclavicular, or axillary adenopathy  Respiratory: anteriorly CTAB, normal respiratory effort  CV: normal rate, regular rhythm, no murmurs, no peripheral edema  GI: soft, nontender, nondistended, no masses, no hepatomegaly, no splenomegaly  MS: digits without clubbing or cyanosis. Skin: no rashes. normal temperature, turgor, and texture. Multiple scattered ecchymoses on arms and legs. Neuro/Psych: alert, oriented to self and month, appropriate affect,  Judgment/insight not tested. Slurred speech    Results:     Lab Results   Component Value Date/Time    WBC 24.8 (H) 12/06/2021 04:35 AM    HGB 14.7 12/06/2021 04:35 AM    HCT 47.8 (H) 12/06/2021 04:35 AM    PLATELET 295 (H) 20/54/6675 04:35 AM    MCV 74.2 (L) 12/06/2021 04:35 AM    ABS.  NEUTROPHILS 23.3 (H) 12/06/2021 04:35 AM     Lab Results   Component Value Date/Time    Sodium 146 (H) 12/06/2021 04:35 AM    Potassium 3.7 12/06/2021 04:35 AM    Chloride 115 (H) 12/06/2021 04:35 AM    CO2 27 12/06/2021 04:35 AM    Glucose 97 12/06/2021 04:35 AM    BUN 39 (H) 12/06/2021 04:35 AM    Creatinine 0.92 12/06/2021 04:35 AM    GFR est AA >60 12/06/2021 04:35 AM    GFR est non-AA 57 (L) 12/06/2021 04:35 AM    Calcium 8.7 12/06/2021 04:35 AM     Lab Results   Component Value Date/Time    Bilirubin, total 0.8 12/06/2021 04:35 AM    ALT (SGPT) 41 12/06/2021 04:35 AM    Alk. phosphatase 180 (H) 12/06/2021 04:35 AM    Protein, total 5.7 (L) 12/06/2021 04:35 AM    Albumin 1.7 (L) 12/06/2021 04:35 AM    Globulin 4.0 12/06/2021 04:35 AM     Lab Results   Component Value Date/Time    TSH 1.10 11/30/2021 10:13 AM       Lab Results   Component Value Date/Time    INR 1.2 (H) 04/16/2013 01:10 AM    aPTT 31.5 04/16/2013 01:10 AM     11/30 /21 Peripheral smear:     Peripheral blood smear:        Increase microcytic, normal chromic red cells with nucleated red   cells   Increased, mature granulocytes   Reduced lymphocytes   Adequate platelets   No blasts   Comment : The findings are compatible with a reactive process. 11/29/21 CT C/A/P wo cont   IMPRESSION  1. Mild pulmonary edema, small pleural effusions, small ascites, and anasarca. Cardiomegaly and small pericardial effusion. 2. Gas-filled, distended cecum without wall thickening or obstruction. Oral  contrast not administered    11/29/21 MRI brain: IMPRESSION  Several small foci of acute infarction in the cerebellar vermis, left occipital  lobe, and left parietal lobe. 12/01/21 NM inflam proc wh body  IMPRESSION  Diffuse pulmonary uptake is noted potentially related to bilateral  pleural effusions and underlying atelectasis. Focal uptake in the left popliteal  region is of uncertain clinical significance. Please correlate with physical  examination. Otherwise normal tracer distribution.     Assessment/Recommendations:   79 y/o female with PMHx of HTN, CAD admitted with acute CVA. 1. Leukocytosis:  Unclear etiology and chronicity as we do not have labs between 2013 and 2021. CBC differential shows neutrophilia, lymphopenia along with elevated Hgb and PLT count. This is somewhat concerning for a myeloproliferative disorder such as Polycythemia vera or CML - will evaluate further. ID following and states no apparent infectious findings on imaging with CT C/A/P and [address] body WBC tagged scan (pulmonary uptake and left popliteal region.) ID narrowing abx. Attempted to get old labs from cardiology group (last CBC 2015) and PCP (last visit Sept 2017); have requested last PCP note. Peripheral smear no blasts; compatible with reactive process. -- Checking EPO, LD, BCR-ABL1, JAK2 with reflex to CALR, MPL - some of these tests will take 2 weeks to result. Will arrange outpatient follow up to review results in 5-6 weeks  -- From a hematological standpoint, if there are no additional signs/symptoms of infection aside from leukocytosis, it may be worthwhile to have pt complete a course of Doxycycline and recheck counts in a few weeks. 2. Acute CVA: Neurology following    3. 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 4. Dysphagia / Poor appetite:  Unclear etiology. Pureed diet with thin liquids. Defer workup to primary team.     5. Goals of care: palliative team following    I have personally seen and evaluated the patient in conjunction with Hakeem Villarreal NP. I find the patient's history and physical exam are consistent with the NP's documentation. I agree with the above assessment and plan, which I have edited if needed. Labs are concerning for a possible myeloproliferative disorder - which I am investigating further.      Signed By: Liz Mcgarry MD

## 2021-12-06 NOTE — PROGRESS NOTES
Bedside and Verbal shift change report given to Thien Ovalle, [facility] (oncoming nurse) by Robert Santiago RN (offgoing nurse). Report included the following information SBAR, Kardex, ED Summary, Intake/Output, MAR, Accordion and Recent Results.

## 2021-12-06 NOTE — PROGRESS NOTES
Physical therapy    4524 Pt received in bed family at bedside. Speech slurred but improved with cues to slow down with words. Reporting nausea and declining OOB activity at this time. PT will continue to follow    Anirudh Warner.  Esteban Joyce

## 2021-12-06 NOTE — PROGRESS NOTES
[address] notified RN of patient having 6 beat run of V-tach. Patient's HR now 85, afib rhythm, patient sleeping. Notified MD, no new orders received. [address] [facility] notified RN of patient having 8 beat run of V-tach. Patient's HR now 75 afib rhythm, patient sleeping. Notified MD, no new orders received. 1528 - Patient had 1 episode of emesis, clear appearance. Patient complaining of nausea and abdominal pain. Patient does not remember last BM and no BM documented during this admission. Notified MD, orders received. 32 61 16 - Patient declined Jodean Coaster when offered, stated she did not feel nauseous anymore. 1930 - Bedside shift change report given to May (oncoming nurse) by Jonathan Whiteside (offgoing nurse). Report included the following information SBAR, Kardex, ED Summary, Intake/Output, MAR, Recent Results and Med Rec Status.

## 2021-12-06 NOTE — PROGRESS NOTES
Problem: Patient Education: Go to Patient Education Activity  Goal: Patient/Family Education  Outcome: Progressing Towards Goal     Problem: Falls - Risk of  Goal: *Absence of Falls  Description: Document [place] Fall Risk and appropriate interventions in the flowsheet.   Outcome: Progressing Towards Goal  Note: Fall Risk Interventions:  Mobility Interventions: Bed/chair exit alarm, Communicate number of staff needed for ambulation/transfer, Strengthening exercises (ROM-active/passive), Utilize walker, cane, or other assistive device    Mentation Interventions: Adequate sleep, hydration, pain control, Bed/chair exit alarm, Door open when patient unattended, Room close to nurse's station, Toileting rounds    Medication Interventions: Bed/chair exit alarm, Evaluate medications/consider consulting pharmacy, Patient to call before getting OOB, Teach patient to arise slowly    Elimination Interventions: Bed/chair exit alarm, Call light in reach, Patient to call for help with toileting needs, Toileting schedule/hourly rounds    History of Falls Interventions: Bed/chair exit alarm, Door open when patient unattended, Room close to nurse's station

## 2021-12-06 NOTE — PROGRESS NOTES
2021   CARE MANAGEMENT NOTE:  Pt transferred from [facility] to the 5th floor. CM reviewed EMR for clinical updates and handoff received from previous  Riccardo BENJAMIN). Pt was admitted with acute CVA. Reportedly, pt had been living alone in an apt. Pt's brother, Nasrin Gómez ([phone]) is the primary family contact. Nephew Eulalio Jenkins ([phone])  Niece Cheo Gasca ([phone])    RUR 14%; LOS 6 days    Transition Plan of Care:  1. Neurology, Cardiology, ID, Palliative following for medical management  2. PT/OT/ST evals complete; pt was mod assistance with bed mobility and SNF was recommended on   3. SNF referrals were made to the followin [facility] (denied, pt not vaccinated)  [facility] (denied)  [facility] (decision pending)  4. Palliative to discuss hospice as an option  5. Mode of transportation to be determined    CM will continue to follow pt for definitive discharge plan ie SNF with hospice decision is pending.   Amol

## 2021-12-06 NOTE — CONSULTS
Palliative medicine brief note- Full visit documentation pending. Chart reviewed. Met with patient, no family at bedside. She was awake, speech slurred, difficult to understand much of what she said. Called patients brother Viral Mccollum, he will be at bedside tomorrow 12/7 between 1300-1400hrs, will plan to meet with him, revisit GOC and sign DDNR.

## 2021-12-06 NOTE — PROGRESS NOTES
Occupational Therapy Note:  Chart reviewed. Attempted OT tx however patient declined despite max encouragement d/t nausea and general fatigue. Will continue to follow.   Ciera Haddad, OTR/L

## 2021-12-06 NOTE — PROGRESS NOTES
Palliative Medicine Consult  [place]: [phone])    Patient Name: Elkin Batista  YOB: 1928    Date of Initial Consult: 2021  Reason for Consult: Care Decisions  Requesting Provider: Dr. Yeyo Miranda   Primary Care Physician: Brianna Pelaez MD     SUMMARY:   Elkin Batista is a 80 y.o. with a past history of HTN,  CHF, EF 30% (), CAD, MI (), Hypothyroidism and Anxiety, who was admitted on 2021 from Home with a diagnosis of Acute CVA, SAMMY and  Leukocytosis. Patient presented to ER w/ cc of new confusion, slurred speech and weakness. Chart review- Neurology following encephalopathy and  acute CVA, EEG pending. Cardiology following, carotid doppler and echo pending. Current medical issues leading to Palliative Medicine involvement include: Care decisions in setting of advanced age, advanced heart disease and  acute stroke resulting in right sided weakness. 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 She is , had 1 son who is . Patient lives in her own home, drives, grocery shops, but her brother, nieces and nephews check on her daily. Baseline cognitive and functional status: Alert and oriented, family deny cognitive impairments. She is Independent with all ADLs, still drives, shops for herself, enjoys going to [employer]. PALLIATIVE DIAGNOSES:   1. Palliative care encounter  2. Altered mental status, unspecified  3. Impaired communication  4. Cachexia  5. Weakness, generalized  6. Goals of care discussion       PLAN:   1. Prior to visit completed chart reviewed for updated information. 2. Patient was seen, no family at bedside. 3. Patient oriented to people and place, no change,  still with little insight regarding hospitalization and associated concerns, continues to defer to her brother. 4. Intake remains very poor, taking bites and sips only.  She tells me that food and drink gets caught in her throat. She continues to require purees with thins, requires intermittent assist with eating/drinking, HOB remains elevated. 5. DNR discussion-Need to have brother sign DDNR prior to discharge. Meeting with her brother Anahy Stark and nephew Binh Pfeiffer, scheduled for tomorrow 12/7 between 1300 -1400 hrs. 6. Weakness, generalized- Below baseline, multifactorial etiology, including CVA, hospital associated deconditioning and very poor nutrition. Patient seems weaker today than when I last saw her, asked that I hold cup for her to take sip. She has not worked with PT/OT. Will ask to follow up  7. Family not at bedside. I called her brother Odell, gave brief update and schedyled meeting tomorrow between 1300 and 1400 hrs  12/7  8. Palliative team will continue to follow with you. 9. Initial consult note routed to primary continuity provider and/or primary health care team members  10. Communicated plan of care with: Palliative IDT, [facility] Team, Jenna Joe RN RN, Dr. Stephanie Angeles / TREATMENT PREFERENCES:     GOALS OF CARE:  Patient/Health Care Proxy Stated Goals: Rehabilitation    TREATMENT PREFERENCES:   Code Status: DNR    Patient and family's personal goals include: being in her own home, independent    Important upcoming milestones or family events: unknown    The patient identifies the following as important for living well: unknown      Advance Care Planning:  [x] The [facility] Interdisciplinary Team has updated the ACP Navigator with [employer] and Patient Capacity      Primary Decision Maker (Active):  Linda Hopkins - Brother - [phone]    Secondary Decision Maker: Cardenas Shape (nephew) - Other Relative - [phone]  Advance Care Planning 11/30/2021   Confirm Advance Directive None   Patient Would Like to Complete Advance Directive No       Medical Interventions: Limited additional interventions       Other:    As far as possible, the palliative care team has discussed with patient / health care proxy about goals of care / treatment preferences for patient. HISTORY:     History obtained from: medical records/nurse    CHIEF COMPLAINT: I need to be straightened out  HPI/SUBJECTIVE:    The patient is:   [] Verbal and participatory  [x] Non-participatory due to:   Patient acknowledged pain around tailbone, stated uncomfortable in bed, needed to be straightened out. She is unable to provide ROS        12/6- HemOnc consulted elevated WBC of unknown etiology. Poor intake, appears weaker    12/2- ID following for Leukocytosis of unknown etiology, she is afebrile. Cardiology does not recommend further workup or intervention, burden for sure outweighs benefit. Worked with  PT/OT in bed, she refused OOB. Very poor intake, only bites and sips, speech pathologist following for dysphagia,       Clinical Pain Assessment (nonverbal scale for severity on nonverbal patients):   Clinical Pain Assessment  Severity: 0          Duration: for how long has pt been experiencing pain (e.g., 2 days, 1 month, years)  Frequency: how often pain is an issue (e.g., several times per day, once every few days, constant)     FUNCTIONAL ASSESSMENT:     Palliative Performance Scale (PPS): 30  PPS: 30       PSYCHOSOCIAL/SPIRITUAL SCREENING:     Palliative IDT has assessed this patient for cultural preferences / practices and a referral made as appropriate to needs (Cultural Services, Patient Advocacy, Ethics, etc.)    Any spiritual / Confucianist concerns:  [] Yes /  [x] No    Caregiver Burnout:  [] Yes /  [x] No /  [] No Caregiver Present      Anticipatory grief assessment:   [x] Normal  / [] Maladaptive       ESAS Anxiety: Anxiety: 0    ESAS Depression: Depression: 0        REVIEW OF SYSTEMS:     Positive and pertinent negative findings in ROS are noted above in HPI.   The following systems were [] reviewed / [x] unable to be reviewed as noted in HPI  Other findings are noted below.  Systems: constitutional, ears/nose/mouth/throat, respiratory, gastrointestinal, genitourinary, musculoskeletal, integumentary, neurologic, psychiatric, endocrine. Positive findings noted below. Modified ESAS Completed by: provider   Fatigue: 7 Drowsiness: 0   Depression: 0 Pain: 0   Anxiety: 0 Nausea: 0   Anorexia: 10 Dyspnea: 2     Constipation: No     Stool Occurrence(s): 0        PHYSICAL EXAM:     From RN flowsheet:  Wt Readings from Last 3 Encounters:   12/03/21 95 lb 7.4 oz (43.3 kg)   04/18/13 122 lb (55.3 kg)     Blood pressure (!) 152/69, pulse 79, temperature 97.5 °F (36.4 °C), resp. rate 19, height 5' 4.02\" (1.626 m), weight 95 lb 7.4 oz (43.3 kg), SpO2 91 %.     Pain Scale 1: Numeric (0 - 10)  Pain Intensity 1: 0  Pain Onset 1: chronic   Pain Location 1: Back  Pain Orientation 1: Right  Pain Description 1: Aching  Pain Intervention(s) 1: Medication (see MAR)  Last bowel movement, if known:     Constitutional: appears stated age, cachectic, frail, NAD  Eyes: pupils equal, anicteric  ENMT: no nasal discharge, dry mucous membranes  Cardiovascular: regular rhythm, distal pulses intact  Respiratory: breathing not labored, symmetric  Gastrointestinal: soft non-tender, +bowel sounds  Musculoskeletal: no deformity, no tenderness to palpation  Skin: warm, dry  Neurologic: following simple  commands, weakly moving all extremities, speech intermittently garbled,  Psychiatric: appropriate affect, unable to assess for  hallucinations  Other:       HISTORY:     Principal Problem:    Acute CVA (cerebrovascular accident) ([facility] [address] (11/30/2021)    Active Problems:    Coronary atherosclerosis of native coronary artery (4/15/2013)      Overview: MI 12/26/12- NC      Hypothyroidism (4/17/2013)      Elevated troponin (11/29/2021)      Dysarthria (11/29/2021)      Ischemic cardiomyopathy (11/30/2021)      Acute metabolic encephalopathy (49/35/1163)      Leukocytosis (11/30/2021)      Underweight (11/30/2021)      Past Medical History:   Diagnosis Date    Acute MI ([facility] [address]     Anxiety     CAD (coronary artery disease)     Hypertension       Past Surgical History:   Procedure Laterality Date    CARDIAC SURG PROCEDURE UNLIST      HX GYN      hysterectomy    HX ORTHOPAEDIC        Family History   Problem Relation Age of Onset    Other Unknown         unknown      History reviewed, no pertinent family history. Social History     Tobacco Use    Smoking status: Never Smoker    Smokeless tobacco: Not on file   Substance Use Topics    Alcohol use: No     No Known Allergies   Current Facility-Administered Medications   Medication Dose Route Frequency    metoprolol succinate (TOPROL-XL) XL tablet 12.5 mg  12.5 mg Oral DAILY    doxycycline (VIBRA-TABS) tablet 100 mg  100 mg Oral Q12H    levothyroxine (SYNTHROID) tablet 100 mcg  100 mcg Oral ACB    acetaminophen (TYLENOL) tablet 650 mg  650 mg Oral Q6H    atorvastatin (LIPITOR) tablet 10 mg  10 mg Oral DAILY    acetaminophen (TYLENOL) tablet 650 mg  650 mg Oral Q4H PRN    Or    acetaminophen (TYLENOL) solution 650 mg  650 mg Per NG tube Q4H PRN    Or    acetaminophen (TYLENOL) suppository 650 mg  650 mg Rectal Q4H PRN    aspirin tablet 325 mg  325 mg Oral DAILY    heparin (porcine) injection 5,000 Units  5,000 Units SubCUTAneous Q8H          LAB AND IMAGING FINDINGS:     Lab Results   Component Value Date/Time    WBC 24.8 (H) 12/06/2021 04:35 AM    HGB 14.7 12/06/2021 04:35 AM    PLATELET 931 (H) 41/94/0685 04:35 AM     Lab Results   Component Value Date/Time    Sodium 146 (H) 12/06/2021 04:35 AM    Potassium 3.7 12/06/2021 04:35 AM    Chloride 115 (H) 12/06/2021 04:35 AM    CO2 27 12/06/2021 04:35 AM    BUN 39 (H) 12/06/2021 04:35 AM    Creatinine 0.92 12/06/2021 04:35 AM    Calcium 8.7 12/06/2021 04:35 AM    Magnesium 2.2 12/04/2021 09:50 AM    Phosphorus 3.7 04/16/2013 01:10 AM      Lab Results   Component Value Date/Time    Alk.  phosphatase 180 (H) 12/06/2021 04:35 AM Protein, total 5.7 (L) 12/06/2021 04:35 AM    Albumin 1.7 (L) 12/06/2021 04:35 AM    Globulin 4.0 12/06/2021 04:35 AM     Lab Results   Component Value Date/Time    INR 1.2 (H) 04/16/2013 01:10 AM    Prothrombin time 12.4 (H) 04/16/2013 01:10 AM    aPTT 31.5 04/16/2013 01:10 AM      No results found for: IRON, FE, TIBC, IBCT, PSAT, FERR   No results found for: PH, PCO2, PO2  No components found for: [place]   Lab Results   Component Value Date/Time     11/29/2021 12:45 PM    CK - MB 0.9 04/16/2013 01:10 AM                Total time: 15  min  Counseling / coordination time, spent as noted above: 10min  > 50% counseling / coordination?: yes    Prolonged service was provided for  []30 min   []75 min in face to face time in the presence of the patient, spent as noted above. Time Start:   Time End:   Note: this can only be billed with 09344 (initial) or 38632 (follow up). If multiple start / stop times, list each separately.

## 2021-12-06 NOTE — PROGRESS NOTES
Problem: Falls - Risk of  Goal: *Absence of Falls  Description: Document Stacie Cervantes Fall Risk and appropriate interventions in the flowsheet.   Outcome: Progressing Towards Goal  Note: Fall Risk Interventions:  Mobility Interventions: Bed/chair exit alarm, Communicate number of staff needed for ambulation/transfer, OT consult for ADLs, Patient to call before getting OOB, PT Consult for mobility concerns    Mentation Interventions: Adequate sleep, hydration, pain control, Bed/chair exit alarm, Door open when patient unattended, Family/sitter at bedside    Medication Interventions: Bed/chair exit alarm, Patient to call before getting OOB, Teach patient to arise slowly, Utilize gait belt for transfers/ambulation    Elimination Interventions: Bed/chair exit alarm, Call light in reach, Toileting schedule/hourly rounds    History of Falls Interventions: Bed/chair exit alarm, Door open when patient unattended

## 2021-12-06 NOTE — PROGRESS NOTES
[facility]  [address] [facility] [place], [address] [place] [address]  [phone]      Medical Progress Note      NAME:         Chad Rosario   :        1928  MRM:        [id number]    Date of service: 2021      Subjective: Patient has been seen and examined as a follow up for multiple medical issues. Chart, labs, diagnostics reviewed. F/u acute CVA. Episodes of SVTs. No cp or dyspnea; did develop n/v with some abd pain later in day. Objective:    Vital Signs:    Visit Vitals  BP (!) 158/83 (BP 1 Location: Left upper arm, BP Patient Position: At rest)   Pulse 83   Temp 98.3 °F (36.8 °C)   Resp 22   Ht 5' 4.02\" (1.626 m)   Wt 43.3 kg (95 lb 7.4 oz)   SpO2 90%   BMI 16.38 kg/m²          Intake/Output Summary (Last 24 hours) at 2021 1602  Last data filed at 2021 0902  Gross per 24 hour   Intake 100 ml   Output 300 ml   Net -200 ml        Physical Examination:    General:   Weak, cachectic and frail looking, not in acute distress    Eyes:   pink conjunctivae, PERRLA with no discharge. ENT:   no ottorrhea or rhinorrhea with dry mucous membranes  Neck: no masses, thyroid non-tender and trachea central.  Pulm: decreased but clear breath sounds without crackles or wheezes  Card:  has regular and normal S1, S2 without thrills, bruits or peripheral edema  Abd:  Soft, non-tender, non-distended, normoactive bowel sounds   Musc:  No cyanosis, clubbing, atrophy or deformities. No calf tenderness  Skin:  No rashes, bruising. Redness over sacral bony prominence.    Neuro: Awake, dysarthric, generally a non focal exam  Psych:  Poor insight     Current Facility-Administered Medications   Medication Dose Route Frequency    [START ON 2021] pantoprazole (PROTONIX) 40 mg in 0.9% sodium chloride 10 mL injection  40 mg IntraVENous DAILY    metoprolol succinate (TOPROL-XL) XL tablet 12.5 mg  12.5 mg Oral DAILY    doxycycline (VIBRA-TABS) tablet 100 mg  100 mg Oral Q12H    levothyroxine (SYNTHROID) tablet 100 mcg  100 mcg Oral ACB    acetaminophen (TYLENOL) tablet 650 mg  650 mg Oral Q6H    atorvastatin (LIPITOR) tablet 10 mg  10 mg Oral DAILY    acetaminophen (TYLENOL) tablet 650 mg  650 mg Oral Q4H PRN    Or    acetaminophen (TYLENOL) solution 650 mg  650 mg Per NG tube Q4H PRN    Or    acetaminophen (TYLENOL) suppository 650 mg  650 mg Rectal Q4H PRN    aspirin tablet 325 mg  325 mg Oral DAILY    heparin (porcine) injection 5,000 Units  5,000 Units SubCUTAneous Q8H        Laboratory data and review:    Recent Labs     12/06/21  0435 12/05/21  0154 12/04/21  0950   WBC 24.8* 28.7* 29.2*   HGB 14.7 17.4* 15.7   HCT 47.8* 56.9* 50.5*   * 437* 460*     Recent Labs     12/06/21  0435 12/05/21  0946 12/04/21  0950   * 142 144   K 3.7 4.1 4.0   * 112* 112*   CO2 27 25 25   GLU 97 102* 104*   BUN 39* 40* 39*   CREA 0.92 1.03* 1.09*   CA 8.7 9.1 8.8   MG  --   --  2.2   ALB 1.7* 1.9*  --    ALT 41 33  --      No components found for: [place]    Diagnostics: Imaging studies have been reviewed      Assessment and Plan:    Nausea/Vomitting/ Abd pain: check ct abd pelvis. Add IV zofran PRN and IV PPI. Monitor     Leukocytosis (11/30/2021) POA: unclear as to why she has a persistent leukocytosis despite empiric IV antibiotics. CT scans chest, abdomen and pelvis unremarkable for any obvious focus of infection. No obvious wounds. Blood cultures neg. Peripheral smear shows a reactive picture. Lactate resolved. WBCs scan otherwise unremarkable other than a left popliteal uptake. No focal tenderness on exam. WBCs trended down overall but remains high. S/p empiric IV Cefepime and Metronidazole; continue IV doxy. No IV fluids given her low EF. ID evaluted. Consult hematology given persistent elevated WBC.      Acute CVA (cerebrovascular accident) ([address] (11/30/2021) / Dysarthria (11/29/2021) POA: MRi brain showed several small foci of acute infarction in the cerebellar vermis, left occipital lobe, and left parietal lobe. Doppler carotids w/ right moderate stenosis and left mild stenosis. , A1c 5.7. Echo showed severe aortic sclerosis, EF 15-20%. No shunt. Seen by neurology. Continue Asprin, Lipitor, PT, OT and speech therapies. CM to arrange for SNF. Palliative care following. DNR. Coronary atherosclerosis of native coronary artery / Elevated troponin (11/29/2021) /  Ischemic cardiomyopathy (11/30/2021) POA: Hx CAD MI 12/26/12- NC. Now with a mild troponin elevation. No overt ischemia. Echo showed a reduced EF of 15-20% with valvular disease. With frequent non sustained SVT. Monitor lytes and replete PRN. Change IV lopressor to oral toprol-xl. Continue Asprin, Lipitor. Cardiology following. Acute metabolic encephalopathy (35/84/3984) POA: likely due to the CVA vs suspected occult infection vs underlying dementia. Continue to monitor her clinical progress     Hypothyroidism (4/17/2013) POA: TSH is normal. Unclear if she is taking any medications. Monitor for now     Underweight (11/30/2021) POA: due to advanced age, likely poor intake.  Diet as tolerated when able    Total time spent for the patient's care: Jade discussed with: Patient, Care Manager and Nursing Staff    Discussed:  Care Plan and D/C Planning    Prophylaxis:  Hep SQ    Anticipated Disposition:  SNF/LTC versus hospice           ___________________________________________________    Attending Physician:   Mauri Kirk DO

## 2021-12-07 NOTE — PROGRESS NOTES
[facility] [place] [facility] [address]  [address] [facility], [address] [facility] [address] [facility]  W: [phone]  F: [phone]      Reason for Visit:   Frederick Guzman is a 80 y.o. female who is seen for evaluation of persistent elevated WBC. History of Present Illness:   Frederick Guzman is a pleasant 80 y.o. female with CAD, HTN admitted 21 with AMS, slurred speech, found to have acute CVA. ED labs notable for WBC 26.2, Hgb 16.8, MCV 75.7, , Creat 1.91, Tbili 1.3, Tpn-High sens 572, Lactic acid 4.6. ED CT head unrevealing, but Brain MRI revealed small foci on acute infarction in the cerebellar vermis, left occipital lobe and left parietal lobe. Neurology evaluated. ID was consulted for leukocytosis. Pt remained afebrile but WBC remains elevated despite broad spectrum abx. Imaging CT C/A/P negative for infectious process. Blood cultures NGTD. WBC tagged scan with diffuse pulmonary uptake and some uptake in left popliteal region. 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 Pt is a poor historian. States follows with Dr. Kiel Pyle. Able to states  and month is Dec but unsure of year. Denies any know hx of elevated WBC or any hx of blood problems or cancer. Spoke with nephew, lR Mahmood and pt's brother, Odell via phone. He is unaware of any doctors other than pt's cardiologist that she went to on a regular basis. He states she was very independent and took care of her docs appts on her own. Now, patient is not eating or drinking well. She reports swallowing problems. Interval History:     Pt awake and alert. No complaints. Denies pain. No family at bedside.              Current Facility-Administered Medications   Medication Dose Route Frequency    pantoprazole (PROTONIX) 40 mg in 0.9% sodium chloride 10 mL injection  40 mg IntraVENous DAILY    ondansetron (ZOFRAN) injection 4 mg  4 mg IntraVENous Q6H PRN    metoprolol succinate (TOPROL-XL) XL tablet 12.5 mg  12.5 mg Oral DAILY    doxycycline (VIBRA-TABS) tablet 100 mg  100 mg Oral Q12H    levothyroxine (SYNTHROID) tablet 100 mcg  100 mcg Oral ACB    acetaminophen (TYLENOL) tablet 650 mg  650 mg Oral Q6H    atorvastatin (LIPITOR) tablet 10 mg  10 mg Oral DAILY    acetaminophen (TYLENOL) tablet 650 mg  650 mg Oral Q4H PRN    Or    acetaminophen (TYLENOL) solution 650 mg  650 mg Per NG tube Q4H PRN    Or    acetaminophen (TYLENOL) suppository 650 mg  650 mg Rectal Q4H PRN    aspirin tablet 325 mg  325 mg Oral DAILY    heparin (porcine) injection 5,000 Units  5,000 Units SubCUTAneous Q8H       No Known Allergies       Review of Systems: A complete review of systems was obtained, reviewed. Pertinent findings reviewed above. Physical Exam:     Visit Vitals  BP (!) 140/88 (BP 1 Location: Left upper arm, BP Patient Position: At rest)   Pulse 88   Temp 97.3 °F (36.3 °C)   Resp 20   Ht 5' 4.02\" (1.626 m)   Wt 49.3 kg (108 lb 11 oz)   SpO2 97%   BMI 18.65 kg/m²     ECOG PS: 3-4  General: elderly, frail, cachetic, no acute distress  Eyes: anicteric sclerae,  HENT: atraumatic, oropharynx clear  Neck: supple,   Respiratory: anteriorly CTAB, normal respiratory effort  CV: normal rate, regular rhythm, no murmurs, no peripheral edema  GI: soft, nontender, nondistended, no masses, no hepatomegaly, no splenomegaly  MS: digits without clubbing or cyanosis. Skin: no rashes. normal temperature, turgor, and texture. Multiple scattered ecchymoses on arms and legs. Neuro/Psych: alert, oriented to self and month, appropriate affect,  Judgment/insight not tested. Slurred/garbled speech    Results:     Lab Results   Component Value Date/Time    WBC 23.4 (H) 12/07/2021 04:46 AM    HGB 14.7 12/07/2021 04:46 AM    HCT 49.4 (H) 12/07/2021 04:46 AM    PLATELET 168 (H) 68/36/4877 04:46 AM    MCV 76.0 (L) 12/07/2021 04:46 AM    ABS.  NEUTROPHILS 21.0 (H) 12/07/2021 04:46 AM     Lab Results   Component Value Date/Time    Sodium 142 12/07/2021 04:46 AM    Potassium 6.2 (H) 12/07/2021 04:46 AM    Chloride 112 (H) 12/07/2021 04:46 AM    CO2 28 12/07/2021 04:46 AM    Glucose 84 12/07/2021 04:46 AM    BUN 40 (H) 12/07/2021 04:46 AM    Creatinine 0.96 12/07/2021 04:46 AM    GFR est AA >60 12/07/2021 04:46 AM    GFR est non-AA 54 (L) 12/07/2021 04:46 AM    Calcium 9.0 12/07/2021 04:46 AM     Lab Results   Component Value Date/Time    Bilirubin, total 1.0 12/07/2021 04:46 AM    ALT (SGPT) 42 12/07/2021 04:46 AM    Alk. phosphatase 186 (H) 12/07/2021 04:46 AM    Protein, total 5.9 (L) 12/07/2021 04:46 AM    Albumin 1.9 (L) 12/07/2021 04:46 AM    Globulin 4.0 12/07/2021 04:46 AM     Lab Results   Component Value Date/Time     (H) 12/06/2021 03:50 PM    TSH 1.10 11/30/2021 10:13 AM       Lab Results   Component Value Date/Time    INR 1.2 (H) 04/16/2013 01:10 AM    aPTT 31.5 04/16/2013 01:10 AM     11/30 /21 Peripheral smear:     Peripheral blood smear:        Increase microcytic, normal chromic red cells with nucleated red   cells   Increased, mature granulocytes   Reduced lymphocytes   Adequate platelets   No blasts   Comment : The findings are compatible with a reactive process. 11/29/21 CT C/A/P wo cont   IMPRESSION  1. Mild pulmonary edema, small pleural effusions, small ascites, and anasarca. Cardiomegaly and small pericardial effusion. 2. Gas-filled, distended cecum without wall thickening or obstruction. Oral  contrast not administered    11/29/21 MRI brain: IMPRESSION  Several small foci of acute infarction in the cerebellar vermis, left occipital  lobe, and left parietal lobe. 12/01/21 NM inflam proc wh body  IMPRESSION  Diffuse pulmonary uptake is noted potentially related to bilateral  pleural effusions and underlying atelectasis. Focal uptake in the left popliteal  region is of uncertain clinical significance. Please correlate with physical  examination.  Otherwise normal tracer distribution. Assessment/Recommendations:   79 y/o female with PMHx of HTN, CAD admitted with acute CVA. 1. Leukocytosis:  Unclear etiology and chronicity as we do not have labs between 2013 and 2021. CBC differential shows neutrophilia, lymphopenia along with elevated Hgb and PLT count. This is somewhat concerning for a myeloproliferative disorder such as Polycythemia vera or CML - will evaluate further. ID following and states no apparent infectious findings on imaging with CT C/A/P and [address] body WBC tagged scan (pulmonary uptake and left popliteal region.) ID narrowing abx. Attempted to get old labs from cardiology group (last CBC 2015) and PCP (last visit Sept 2017); have requested last PCP note. Peripheral smear no blasts; compatible with reactive process. -- Checking EPO, LD, BCR-ABL1, JAK2 with reflex to CALR, MPL - some of these tests will take 2 weeks to result. Follow up to review results established for the clinic and placed in discharge summary. -- From a hematological standpoint, if there are no additional signs/symptoms of infection aside from leukocytosis, it may be worthwhile to have pt complete a course of Doxycycline and recheck counts in a few weeks. 2. Acute CVA: Neurology following    3. 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 4. Dysphagia / Poor appetite:  Unclear etiology. Has had speech eval; Pureed diet with thin liquids. Defer workup to primary team. Dietician following. 5 Debility: PTOT eval pending    6. Goals of care: palliative team following    Plan reviewed with Dr Yeison Peterson.       Signed By: Say Guevara NP

## 2021-12-07 NOTE — PROGRESS NOTES
12/7/2021   CARE MANAGEMENT NOTE: CM reviewed EMR for clinical updates. Pt was admitted with acute CVA. Reportedly, pt had been living alone in an apt. Pt's brother, Citlalli Leong ([phone]) is the primary family contact. Nephabelardo Russell Trina ([phone])  Niece Marko Kocher ([phone])     RUR 14%; LOS 7 days     Transition Plan of Care:  1. Neurology, Cardiology, ID, Palliative following for medical management  2. PT/OT - pt declined on 12/6 2/2 nausea and fatigue. SNF - [facility] (decision pending)  3. Palliative Care meeting is today between 1 p.m. and 2 p.m. (hospice decision pending). Uncertain if pt has Medicaid insurance. 4.  Mode of transportation to be determined     CM will continue to follow pt for definitive discharge plan.   Amol

## 2021-12-07 NOTE — WOUND CARE
Wound Consult: Follow Up Visit. Chart reviewed. Consulted for sacral redness and following for same. Spoke with patients nurse,  Antwon Orozco and in to reassess patient. Patient is resting on a Centrella bed with air support mattress. Heels off loaded with pillows. Patient is alert, cooperative; requires minimal assitanct to move side to side in bed;however, she does not turn independently. Michele score 14; Pure wick in use. Assessment:  Sacrum - blanching redness of intact skin; dark red, slow to vernon in small area; remains tender for patient; skin remains intact. Right lower buttock intact, pink/blanching. Heels pink, blanching. Spine with out redness. Hips with out redness; has some dry excoriations on right hip from scratching. Treatment:  Hydrocolloid used to sacral area and placed a sacral mepilex over - hydrocolloid may provide better comfort over area. Waffle cushion placed under patient. Wound Recommendations:  Hydrocolloid to sacral area; place sacral mepilex over; change every three days or if soiled. Skin Care / PI Prevention Recommendations:  1. Minimize friction/shear: minimize layers of linen/pads under patient. 2. Off load pressure/reposition:  turn and reposition approximately every 2 hours; float heels with pillows or use off loading heel boots; waffle cushion for sitting; called for low air loss mattress as patient continues to have redness and discomfort and nutritionally is probably not meeting her needs. 3. Manage Moisture - keep skin folds dry; incontinence skin care with incontinence wipes;  Pure wick. 4. Continue to monitor nutrition, pain, and skin risk scale, and skin assessment. Plan:  Hand off to Dr. Kev Maciel regarding findings and proposed orders for treatment. We will continue to reassess routinely and as needed. Please re-consult should concerns arise despite continued skin/PI prevention measures.     Cirilo Robles, MSN, RN, [address] [facility], Wound / Ostomy Department  Wound Healing Office [phone]

## 2021-12-07 NOTE — PROGRESS NOTES
Problem: Dysphagia (Adult)  Goal: *Acute Goals and Plan of Care (Insert Text)  Description: Speech pathology goals  Initiated 11/30/2021  1. Patient will tolerate puree/thin liquid diet with no overt s/s aspiration within 7 days  2. Patient will tolerate trials of soft solids with timely/complete mastication and full oral clearance within 7 days  Outcome: Progressing Towards Goal     SPEECH LANGUAGE PATHOLOGY DYSPHAGIA TREATMENT  Patient: Nandini Yuan (89 y.o. female)  Date: 12/7/2021  Diagnosis: Altered mental state [R41.82]  Elevated troponin [R77.8]  Dysarthria [R47.1]   Acute CVA (cerebrovascular accident) [facility])       Precautions:  Fall, Bed Alarm, DNR, Skin    ASSESSMENT:  Patient agreed to 2 bites of puree and 3 sips of thin liquids with cueing and encouragement. Patient demonstrated slow oral manipulation with purees. After thin liquids patient demonstrated belch and then throat clear x 1. Patient refused solid trials and given oral dysphagia likely is not safe for diet advance. Continue pureed diet, thin liquids. PLAN:  Recommendations and Planned Interventions:  Pureed diet  Thin liquids  Patient continues to benefit from skilled intervention to address the above impairments. Continue treatment per established plan of care. Discharge Recommendations: To Be Determined     SUBJECTIVE:   Patient stated I'm tired. I haven't done anything but I'm tired. \".     OBJECTIVE:   Cognitive and Communication Status:  Neurologic State: Alert  Orientation Level: Oriented to person  Cognition: Decreased attention/concentration, Follows commands  Perception: Cues to maintain midline in sitting, Cues to maintain midline in standing, Tactile, Verbal, Visual  Perseveration: No perseveration noted  Safety/Judgement: Awareness of environment, Decreased awareness of need for assistance, Decreased awareness of need for safety, Decreased insight into deficits, Fall prevention  Dysphagia Treatment:  Oral Assessment:     P.O. Trials:  Patient Position: Upright in bed  Vocal quality prior to P.O.:  (Moderate dysarthria)  Consistency Presented: Puree; Thin liquid  How Presented: Self-fed/presented; SLP-fed/presented; Spoon; Straw     Bolus Acceptance: No impairment  Bolus Formation/Control: Impaired  Type of Impairment: Delayed  Propulsion: No impairment  Oral Residue: None  Initiation of Swallow: No impairment  Laryngeal Elevation: Functional  Aspiration Signs/Symptoms: Clear throat  Pharyngeal Phase Characteristics: No impairment, issues, or problems                      After treatment:   Patient left in no apparent distress in bed and Call bell within reach    COMMUNICATION/EDUCATION:   Patient was educated regarding role of SLP, diet, swallow precautions and POC. Patient nodded.     The patient's plan of care including recommendations, planned interventions, and recommended diet changes were discussed with:     KUSH Barraza  Time Calculation: 15 mins

## 2021-12-07 NOTE — PROGRESS NOTES
PHYSICAL THERAPY:Pt was unavailable with nursing on arrival of PT. PT will follow later today as time allows.

## 2021-12-07 NOTE — PROGRESS NOTES
[facility] [address]  [address], [place], [address] [facility]  [phone]      Medical Progress Note      NAME:         Nandini Yuan   :        1928  MRM:        [id number]    Date of service: 2021      Subjective: Patient has been seen and examined as a follow up for multiple medical issues. Chart, labs, diagnostics reviewed. F/u acute CVA. Episodes of SVTs. No cp or dyspnea. Plan for discharge to hospice. Objective:    Vital Signs:    Visit Vitals  BP (!) 163/69 (BP 1 Location: Left upper arm, BP Patient Position: Semi fowlers)   Pulse 63   Temp 97.3 °F (36.3 °C)   Resp 18   Ht 5' 4.02\" (1.626 m)   Wt 49.3 kg (108 lb 11 oz)   SpO2 93%   BMI 18.65 kg/m²        No intake or output data in the 24 hours ending 21 1742     Physical Examination:    General:   Weak, cachectic and frail looking, not in acute distress    Eyes:   pink conjunctivae, PERRLA with no discharge. ENT:   no ottorrhea or rhinorrhea with dry mucous membranes  Neck: no masses, thyroid non-tender and trachea central.  Pulm: decreased but clear breath sounds without crackles or wheezes  Card:  has regular and normal S1, S2 without thrills, bruits or peripheral edema  Abd:  Soft, non-tender, non-distended, normoactive bowel sounds   Musc:  No cyanosis, clubbing, atrophy or deformities. No calf tenderness  Skin:  No rashes, bruising. Redness over sacral bony prominence.    Neuro: Awake, dysarthric, generally a non focal exam  Psych:  Poor insight     Current Facility-Administered Medications   Medication Dose Route Frequency    pantoprazole (PROTONIX) 40 mg in 0.9% sodium chloride 10 mL injection  40 mg IntraVENous DAILY    ondansetron (ZOFRAN) injection 4 mg  4 mg IntraVENous Q6H PRN    metoprolol succinate (TOPROL-XL) XL tablet 12.5 mg  12.5 mg Oral DAILY    doxycycline (VIBRA-TABS) tablet 100 mg  100 mg Oral Q12H    levothyroxine (SYNTHROID) tablet 100 mcg  100 mcg Oral ACB    acetaminophen (TYLENOL) tablet 650 mg  650 mg Oral Q6H    atorvastatin (LIPITOR) tablet 10 mg  10 mg Oral DAILY    acetaminophen (TYLENOL) tablet 650 mg  650 mg Oral Q4H PRN    Or    acetaminophen (TYLENOL) solution 650 mg  650 mg Per NG tube Q4H PRN    Or    acetaminophen (TYLENOL) suppository 650 mg  650 mg Rectal Q4H PRN    aspirin tablet 325 mg  325 mg Oral DAILY    heparin (porcine) injection 5,000 Units  5,000 Units SubCUTAneous Q8H        Laboratory data and review:    Recent Labs     12/07/21  0446 12/06/21  0435 12/05/21  0154   WBC 23.4* 24.8* 28.7*   HGB 14.7 14.7 17.4*   HCT 49.4* 47.8* 56.9*   * 489* 437*     Recent Labs     12/07/21  1140 12/07/21  0446 12/06/21  0435 12/05/21  0946 12/05/21  0946    142 146*   < > 142   K 4.6 6.2* 3.7   < > 4.1   * 112* 115*   < > 112*   CO2 19* 28 27   < > 25   GLU 74 84 97   < > 102*   BUN 40* 40* 39*   < > 40*   CREA 0.92 0.96 0.92   < > 1.03*   CA 9.2 9.0 8.7   < > 9.1   ALB  --  1.9* 1.7*  --  1.9*   ALT  --  42 41  --  33    < > = values in this interval not displayed. No components found for: [place]    Diagnostics: Imaging studies have been reviewed      Assessment and Plan:    Nausea/Vomitting/ Abd pain:  ct abd pelvis wo any acute concerns. IV zofran PRN and IV PPI. Monitor     Leukocytosis (11/30/2021) POA: unclear as to why she has a persistent leukocytosis despite empiric IV antibiotics. CT scans chest, abdomen and pelvis unremarkable for any obvious focus of infection. No obvious wounds. Blood cultures neg. Peripheral smear shows a reactive picture. Lactate resolved. WBCs scan otherwise unremarkable other than a left popliteal uptake. No focal tenderness on exam. WBCs trended down overall but remains high. S/p empiric IV Cefepime and Metronidazole; continue IV doxy. No IV fluids given her low EF. ID evaluted.  Consulted hematology given persistent elevated WBC; however, plans for hospice on DC. Acute CVA (cerebrovascular accident) ([facility] [address] (11/30/2021) / Dysarthria (11/29/2021) POA: MRi brain showed several small foci of acute infarction in the cerebellar vermis, left occipital lobe, and left parietal lobe. Doppler carotids w/ right moderate stenosis and left mild stenosis. , A1c 5.7. Echo showed severe aortic sclerosis, EF 15-20%. No shunt. Seen by neurology. Continue Asprin, Lipitor, PT, OT and speech therapies. DNR and now plans for hospice on DC. Coronary atherosclerosis of native coronary artery / Elevated troponin (11/29/2021) /  Ischemic cardiomyopathy (11/30/2021) POA: Hx CAD MI 12/26/12- [place]. Now with a mild troponin elevation. No overt ischemia. Echo showed a reduced EF of 15-20% with valvular disease. With frequent non sustained SVT. Monitor lytes and replete PRN. Change IV lopressor to oral toprol-xl. Continue Asprin, Lipitor. Cardiology following. Acute metabolic encephalopathy (79/53/4175) POA: likely due to the CVA vs suspected occult infection vs underlying dementia. Continue to monitor her clinical progress     Hypothyroidism (4/17/2013) POA: TSH is normal. Unclear if she is taking any medications. Monitor for now     Underweight (11/30/2021) POA: due to advanced age, likely poor intake.  Diet as tolerated when able    Total time spent for the patient's care: [address] discussed with: Patient, Care Manager and Nursing Staff    Discussed:  Care Plan and D/C Planning    Prophylaxis:  Hep SQ    Anticipated Disposition:  SNF/LTC versus hospice           ___________________________________________________    Attending Physician:   Alessandro Dorado DO

## 2021-12-07 NOTE — PROGRESS NOTES
Palliative Medicine Consult  [place]: [phone] ([phone])    Patient Name: Nandini Yuan  YOB: 1928    Date of Initial Consult: 2021  Reason for Consult: Care Decisions  Requesting Provider: Dr. Edward Cohe   Primary Care Physician: Landon Mills MD     SUMMARY:   Nandini Yuan is a 80 y.o. with a past history of HTN,  CHF, EF 30% (), CAD, MI (), Hypothyroidism and Anxiety, who was admitted on 2021 from Home with a diagnosis of Acute CVA, SAMMY and  Leukocytosis. Patient presented to ER w/ cc of new confusion, slurred speech and weakness. Chart review- Neurology following encephalopathy and  acute CVA, EEG pending. Cardiology following, carotid doppler and echo pending. Current medical issues leading to Palliative Medicine involvement include: Care decisions in setting of advanced age, advanced heart disease and  acute stroke resulting in right sided weakness. 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 She is , had 1 son who is . Patient lives in her own home, drives, grocery shops, but her brother, nieces and nephews check on her daily. Baseline cognitive and functional status: Alert and oriented, family deny cognitive impairments. She is Independent with all ADLs, still drives, shops for herself, enjoys going to [employer]. PALLIATIVE DIAGNOSES:   1. Palliative care encounter  2. Concerned about end of life  3. Altered mental status, unspecified  4. Failure to thrive  5. Hypoalbuminemia  6. Debility  7. DNR Discussion  8. Goals of care discussion       PLAN:   1. Prior to visit completed chart reviewed for updated information. 2. Met with patient, returned later when family arrived.  Reviewed hospitalization and associated concerns with patients brother Odell (primary decision maker), nephew Kailash Trevino (secondary) and Loren Ellis.   3. Patients mentation and understanding of hospitalization waxes and wanes. Family seems to have good insight, understands she will not be able to return home,and that it is unlikely she would make significant progress with PT/OT. 4. Family electing to have patient discharged to LTC facility or [address] [place] [address] with Hospice. I placed order for case management to consult Hospice. I also discussed plan with unit DALI Menendez. 5. Advanced care planning discussion- AMD completed with palliative  Iris Rock, designated her brother Odell as primary decision maker and nephew Dolores Azul as secondary. 6. DNR discussion- Assisted primary decision maker Mac with completing Durable DNR, copy placed in chart to be scanned to EMR and original +1 copy given to family. 7. Initial consult note routed to primary continuity provider and/or primary health care team members  8. Communicated plan of care with: Palliative IDT, [facility] Team, Gemma MCNALLY and Dr. [place] Michele / TREATMENT PREFERENCES:     GOALS OF CARE:  Patient/Health Care Proxy Stated Goals: Rehabilitation    TREATMENT PREFERENCES:   Code Status: DNR    Patient and family's personal goals include: being in her own home, independent    Important upcoming milestones or family events: unknown    The patient identifies the following as important for living well: unknown      Advance Care Planning:  [x] The [facility] Interdisciplinary Team has updated the ACP Navigator with [address] and Patient Capacity      Primary Decision Maker (Active):  Alex Barrow - Brother - [phone]    Secondary Decision Maker: Kristi Garcia (nephew) - Other Relative - [phone]  Advance Care Planning 11/30/2021   Confirm Advance Directive None   Patient Would Like to Complete Advance Directive No       Medical Interventions: Limited additional interventions       Other:    As far as possible, the palliative care team has discussed with patient / health care proxy about goals of care / treatment preferences for patient. HISTORY:     History obtained from: medical records/nurse/family    CHIEF COMPLAINT: Im ok  HPI/SUBJECTIVE:    The patient is:   [] Verbal and participatory  [x] Non-participatory due to:   Patient still reporting pain around tailbone, stated uncomfortable in bed, nausea better. She is unable to provide ROS    12/7- poor intake, Albumin 1.9    12/6- HemOnc consulted elevated WBC of unknown etiology. Poor intake, appears weaker, CT bilat pleural effusions and possible superimposed airspace dx right base, enlarged heart with ascites and abdominal wall edema compatible with third spacing of fluid, extensive atherosclerotic vascular change. 12/2- ID following for Leukocytosis of unknown etiology, she is afebrile. Cardiology does not recommend further workup or intervention, burden for sure outweighs benefit. Worked with  PT/OT in bed, she refused OOB.  Very poor intake, only bites and sips, speech pathologist following for dysphagia,       Clinical Pain Assessment (nonverbal scale for severity on nonverbal patients):   Clinical Pain Assessment  Severity: 0          Duration: for how long has pt been experiencing pain (e.g., 2 days, 1 month, years)  Frequency: how often pain is an issue (e.g., several times per day, once every few days, constant)     FUNCTIONAL ASSESSMENT:     Palliative Performance Scale (PPS): 30  PPS: 30       PSYCHOSOCIAL/SPIRITUAL SCREENING:     Palliative IDT has assessed this patient for cultural preferences / practices and a referral made as appropriate to needs (Cultural Services, Patient Advocacy, Ethics, etc.)    Any spiritual / Shinto concerns:  [] Yes /  [x] No    Caregiver Burnout:  [] Yes /  [x] No /  [] No Caregiver Present      Anticipatory grief assessment:   [x] Normal  / [] Maladaptive       ESAS Anxiety: Anxiety: 0    ESAS Depression: Depression: 0        REVIEW OF SYSTEMS:     Positive and pertinent negative findings in ROS are noted above in HPI. The following systems were [] reviewed / [x] unable to be reviewed as noted in HPI  Other findings are noted below. Systems: constitutional, ears/nose/mouth/throat, respiratory, gastrointestinal, genitourinary, musculoskeletal, integumentary, neurologic, psychiatric, endocrine. Positive findings noted below. Modified ESAS Completed by: provider   Fatigue: 7 Drowsiness: 0   Depression: 0 Pain: 0   Anxiety: 0 Nausea: 0   Anorexia: 10 Dyspnea: 2     Constipation: No     Stool Occurrence(s): 0        PHYSICAL EXAM:     From RN flowsheet:  Wt Readings from Last 3 Encounters:   12/06/21 108 lb 11 oz (49.3 kg)   04/18/13 122 lb (55.3 kg)     Blood pressure (!) 146/93, pulse 72, temperature 97.3 °F (36.3 °C), resp. rate 18, height 5' 4.02\" (1.626 m), weight 108 lb 11 oz (49.3 kg), SpO2 98 %.     Pain Scale 1: Numeric (0 - 10)  Pain Intensity 1: 0  Pain Onset 1: chronic   Pain Location 1: Back  Pain Orientation 1: Right  Pain Description 1: Aching  Pain Intervention(s) 1: Medication (see MAR)  Last bowel movement, if known:     Constitutional: appears stated age, cachectic, frail, NAD  Eyes: pupils equal, anicteric  ENMT: no nasal discharge, dry mucous membranes  Cardiovascular: regular rhythm, distal pulses intact  Respiratory: breathing not labored, symmetric  Gastrointestinal: soft non-tender, +bowel sounds  Musculoskeletal: no deformity, no tenderness to palpation  Skin: warm, dry  Neurologic: following simple  commands, weakly moving all extremities, speech intermittently garbled,  Psychiatric: appropriate affect, unable to assess for  hallucinations  Other:       HISTORY:     Principal Problem:    Acute CVA (cerebrovascular accident) ([facility] [facility] (11/30/2021)    Active Problems:    Coronary atherosclerosis of native coronary artery (4/15/2013)      Overview: MI 12/26/12- [place]      Hypothyroidism (4/17/2013)      Elevated troponin (11/29/2021)      Dysarthria (11/29/2021)      Ischemic cardiomyopathy (11/30/2021)      Acute metabolic encephalopathy (87/24/3893)      Leukocytosis (11/30/2021)      Underweight (11/30/2021)      Past Medical History:   Diagnosis Date    Acute MI ([address]     Anxiety     CAD (coronary artery disease)     Hypertension       Past Surgical History:   Procedure Laterality Date    CARDIAC SURG PROCEDURE UNLIST      HX GYN      hysterectomy    HX ORTHOPAEDIC        Family History   Problem Relation Age of Onset    Other Unknown         unknown      History reviewed, no pertinent family history.   Social History     Tobacco Use    Smoking status: Never Smoker    Smokeless tobacco: Not on file   Substance Use Topics    Alcohol use: No     No Known Allergies   Current Facility-Administered Medications   Medication Dose Route Frequency    pantoprazole (PROTONIX) 40 mg in 0.9% sodium chloride 10 mL injection  40 mg IntraVENous DAILY    ondansetron (ZOFRAN) injection 4 mg  4 mg IntraVENous Q6H PRN    metoprolol succinate (TOPROL-XL) XL tablet 12.5 mg  12.5 mg Oral DAILY    doxycycline (VIBRA-TABS) tablet 100 mg  100 mg Oral Q12H    levothyroxine (SYNTHROID) tablet 100 mcg  100 mcg Oral ACB    acetaminophen (TYLENOL) tablet 650 mg  650 mg Oral Q6H    atorvastatin (LIPITOR) tablet 10 mg  10 mg Oral DAILY    acetaminophen (TYLENOL) tablet 650 mg  650 mg Oral Q4H PRN    Or    acetaminophen (TYLENOL) solution 650 mg  650 mg Per NG tube Q4H PRN    Or    acetaminophen (TYLENOL) suppository 650 mg  650 mg Rectal Q4H PRN    aspirin tablet 325 mg  325 mg Oral DAILY    heparin (porcine) injection 5,000 Units  5,000 Units SubCUTAneous Q8H          LAB AND IMAGING FINDINGS:     Lab Results   Component Value Date/Time    WBC 23.4 (H) 12/07/2021 04:46 AM    HGB 14.7 12/07/2021 04:46 AM    PLATELET 748 (H) 61/22/7826 04:46 AM     Lab Results   Component Value Date/Time    Sodium 142 12/07/2021 11:40 AM    Potassium 4.6 12/07/2021 11:40 AM    Chloride 113 (H) 12/07/2021 11:40 AM    CO2 19 (L) 12/07/2021 11:40 AM    BUN 40 (H) 12/07/2021 11:40 AM    Creatinine 0.92 12/07/2021 11:40 AM    Calcium 9.2 12/07/2021 11:40 AM    Magnesium 2.2 12/04/2021 09:50 AM    Phosphorus 3.7 04/16/2013 01:10 AM      Lab Results   Component Value Date/Time    Alk. phosphatase 186 (H) 12/07/2021 04:46 AM    Protein, total 5.9 (L) 12/07/2021 04:46 AM    Albumin 1.9 (L) 12/07/2021 04:46 AM    Globulin 4.0 12/07/2021 04:46 AM     Lab Results   Component Value Date/Time    INR 1.2 (H) 04/16/2013 01:10 AM    Prothrombin time 12.4 (H) 04/16/2013 01:10 AM    aPTT 31.5 04/16/2013 01:10 AM      No results found for: IRON, FE, TIBC, IBCT, PSAT, FERR   No results found for: PH, PCO2, PO2  No components found for: [place]   Lab Results   Component Value Date/Time     11/29/2021 12:45 PM    CK - MB 0.9 04/16/2013 01:10 AM                Total time: 35  min  Counseling / coordination time, spent as noted above: 25min  > 50% counseling / coordination?: yes    Prolonged service was provided for  []30 min   []75 min in face to face time in the presence of the patient, spent as noted above. Time Start:   Time End:   Note: this can only be billed with 41100 (initial) or 00735 (follow up). If multiple start / stop times, list each separately.

## 2021-12-08 NOTE — PROGRESS NOTES
Problem: Self Care Deficits Care Plan (Adult)  Goal: *Acute Goals and Plan of Care (Insert Text)  Description:   FUNCTIONAL STATUS PRIOR TO ADMISSION: Patient was independent and active without use of DME.     HOME SUPPORT: The patient lived alone with no local support. Occupational Therapy Goals  Weekly re assessment 12/8/2021- continue set goals   Initiated 11/30/2021  1. Patient will perform grooming with minimal assistance within 7 day(s). 2.  Patient will perform upper body dressing and bathing with moderate assistance  within 7 day(s). 3.  Patient will perform lower body dressing and bathing with moderate assistance  within 7 day(s). 4.  Patient will perform toilet transfers to [facility] with moderate assistance within 7 day(s). 5.  Patient will perform all aspects of toileting with moderate assistance  within 7 day(s). 6.  Patient will participate in upper extremity therapeutic exercise/activities with minimum assistance for 10 minutes within 7 day(s). 7.  Patient will engage in full Fugl-Barrett Assessment of Upper Extremity within 7 days. Outcome: Progressing Towards Goal   OCCUPATIONAL THERAPY TREATMENT/WEEKLY RE-EVALUATION  Patient: Kelsey Parks (75 y.o. female)  Date: 12/8/2021  Diagnosis: Altered mental state [R41.82]  Elevated troponin [R77.8]  Dysarthria [R47.1]   Acute CVA (cerebrovascular accident) [facility])       Precautions: Fall, Bed Alarm, DNR, Skin  Chart, occupational therapy assessment, plan of care, and goals were reviewed. ASSESSMENT  Based on the objective data described below, patient with good participation today. She is agreeable to attempt for OOB. Patient to EOB with mod assist x 2. Once seated patient with  posterior lean, and requires near constant cues for anterior lean for midline. Patient becomes shaky and reports very nervous. Patient able to stand with mod x 2 and demonstrates heavy posterior lean, but is able to hold RW with bilateral UEs.   Patient able to manage small side steps against EOB. She is currently not safe for chair transfer and returned to bed at end of session. Patient sat in modified chair position for feeding. She is able to bring spoon to mouth but does so only with intense encouragement and assist for loading food to spoon. Patient with minimal intake prior to reporting \" I'm full. No more\". Current Level of Function Impacting Discharge (ADLs): mod x 2    Other factors to consider for discharge:          PLAN :  Goals have been updated based on progression since last assessment. Patient continues to benefit from skilled intervention to address the above impairments. Continue to follow patient 5 times a week to address goals. Recommend with staff: self feeding, UE ADLs    Recommend next OT session: progression of goals     Recommendation for discharge: (in order for the patient to meet his/her long term goals)  To be determined: SNF     This discharge recommendation:  Has been made in collaboration with the attending provider and/or case management    Equipment recommendations for successful discharge (if) home: TBD       SUBJECTIVE:   Patient stated My feet look better, don't they? Claudio Sommer    OBJECTIVE DATA SUMMARY:   Cognitive/Behavioral Status:  Neurologic State: Alert  Orientation Level: Oriented to person; Oriented to place  Cognition: Follows commands  Perception: Cues to maintain midline in sitting; Cues to maintain midline in standing  Perseveration: No perseveration noted  Safety/Judgement: Decreased insight into deficits    Functional Mobility and Transfers for ADLs:  Bed Mobility:  Rolling: Moderate assistance; Assist x2  Supine to Sit: Moderate assistance; Assist x2  Sit to Supine: Moderate assistance; Assist x2  Scooting: Moderate assistance; Assist x2    Transfers:  Sit to Stand: Moderate assistance; Assist x2          Balance:  Sitting: Impaired;  With support  Sitting - Static: Poor (constant support)  Sitting - Dynamic: Poor (constant support)  Standing: Impaired; Pull to stand; With support  Standing - Static: Constant support; Poor  Standing - Dynamic : Constant support; Poor    ADL Intervention:  Feeding  Food to Mouth: Minimum assistance  Cues:  (requires encouragement to perform)                        Lower Body Dressing Assistance  Socks:  Total assistance (dependent)         Cognitive Retraining  Safety/Judgement: Decreased insight into deficits    Pain:  None reported     Activity Tolerance:   Fair    After treatment patient left in no apparent distress:   Supine in bed, Call bell within reach, and Bed / chair alarm activated    COMMUNICATION/COLLABORATION:   The patients plan of care was discussed with: Physical Therapist and Registered Nurse    Foreign Roberts OTR/L  Time Calculation: 30 mins

## 2021-12-08 NOTE — PROGRESS NOTES
Comprehensive Nutrition Assessment    Type and Reason for Visit: Reassess    Nutrition Recommendations/Plan:   1. Continue pureed diet per SLP  2. Continue Magic Cup once daily to increase kcal/protein intake (290 kcal, 38 g carbs, 9 g protein)  3. Continue Ensure Enlive BID to increase kcal/protein intake (700 kcal, 88 g Carbs, 40 g protein)  4. No BM documented x 9 days - consider beginning bowel regimen. Nutrition Assessment:    12/8: Follow up. Poor PO continues, <25% of all meals. Per IDR, patient & family have elected to pursue hospice at a facility once discharged. No further intensive nutrition interventions are indicated at this time. Patient asleep at time of RD visit, no family present. Continue to provide PO as tolerated/for comfort. 12/1: Follow up. Able to meet with patient and brother today. Brother states patient usually drinks 1 - 2 Boosts or Ensures per day, and usually consumes only cookies. Patient appears more alert today. Patient stated she liked her ice cream yesterday (Magic Cup). NFPE completed at this encounter. Brother states patient has not recently lost weight, but is unsure of UBW. Denies previous chewing/swallowing problems but patient is edentulous. 11/30: Pt is a 80year old female admitted with Altered mental state [R41.82]  Elevated troponin [R77.8]  Dysarthria [R47.1]. She  has a past medical history of Acute MI ([address] Anxiety, CAD (coronary artery disease), and Hypertension. RD consulted for poor appetite and wounds. Off floor at time of RD visit. SLP recommended puree diet with thin liquids - patient oriented to person only per notes. Unable to contact NOK. RD to follow up at a later date in attempt to gain further hx. No noted N/V/D at this time. NKFA.     Wt Readings from Last 10 Encounters:   12/07/21 45.6 kg (100 lb 8.5 oz)   04/18/13 55.3 kg (122 lb)     Patient Vitals for the past 168 hrs:   % Diet Eaten   12/07/21 1830 1 - 25%   12/07/21 1430 1 - 25% 12/06/21 1400 1 - 25%   12/06/21 0902 1 - 25%   12/05/21 1233 1 - 25%   12/03/21 0800 1 - 25%   12/02/21 1300 1 - 25%   12/02/21 0830 1 - 25%   12/01/21 1843 1 - 25%     Last 3 Recorded Weights in this Encounter    12/03/21 2047 12/06/21 2245 12/07/21 2328   Weight: 43.3 kg (95 lb 7.4 oz) 49.3 kg (108 lb 11 oz) 45.6 kg (100 lb 8.5 oz)       Malnutrition Assessment:  Malnutrition Status:  Severe malnutrition    Context:  Chronic illness     Findings of the 6 clinical characteristics of malnutrition:   Energy Intake:  No significant decrease in energy intake  Weight Loss:  Unable to assess     Body Fat Loss:  7 - Severe body fat loss, Orbital, Triceps, Fat overlying ribs, Buccal region   Muscle Mass Loss:  7 - Severe muscle mass loss, Clavicles (pectoralis &deltoids), Calf (gastrocnemius), Thigh (quadriceps), Scapula (trapezius)  Fluid Accumulation:  No significant fluid accumulation,     Strength:  Not performed     Estimated Daily Nutrient Needs:  Energy (kcal): 0898-7266 (25-30); Weight Used for Energy Requirements: Admission  Protein (g): 46-55 (1.0-1.2); Weight Used for Protein Requirements: Admission  Fluid (ml/day): 2835-7880; Method Used for Fluid Requirements: 1 ml/kcal    Nutrition Related Findings:       ABD:  Flat, poor appetite, with active bowel sounds 12/8  Last BM: Unknown - PTA? Would be without BM x 9 days. Edema: None noted 12/8    Nutr. Labs:  Lab Results   Component Value Date/Time    GFR est AA >60 12/07/2021 11:40 AM    GFR est non-AA 57 (L) 12/07/2021 11:40 AM    Creatinine 0.92 12/07/2021 11:40 AM    BUN 40 (H) 12/07/2021 11:40 AM    Sodium 142 12/07/2021 11:40 AM    Potassium 4.6 12/07/2021 11:40 AM    Chloride 113 (H) 12/07/2021 11:40 AM    CO2 19 (L) 12/07/2021 11:40 AM     Lab Results   Component Value Date/Time    Glucose 74 12/07/2021 11:40 AM     Lab Results   Component Value Date/Time    Hemoglobin A1c 5.7 (H) 11/30/2021 02:23 AM       Nutr. Meds:  Lipitor  Synthroid  Toprol-XL  Vancomycin  Protonix    Wounds:    Per wound care note - Stage 1 POA inner right buttocks      Current Nutrition Therapies:  ADULT DIET Dysphagia - Pureed  ADULT ORAL NUTRITION SUPPLEMENT Lunch; Frozen Supplement  ADULT ORAL NUTRITION SUPPLEMENT Dinner, Breakfast; Standard High Calorie/High Protein    Anthropometric Measures:  · Height:  5' 4.02\" (162.6 cm)  · Current Body Wt:  45.6 kg (100 lb 8.5 oz)   · Ideal Body Wt:  120 lbs:  83.8 %   Body mass index is 17.25 kg/m².   · BMI Category:  Underweight (BMI less than 22) age over 72       Nutrition Diagnosis:   · Severe malnutrition related to inadequate protein-energy intake as evidenced by wounds, BMI, severe muscle loss, severe loss of subcutaneous fat    Nutrition Interventions:   Food and/or Nutrient Delivery: Continue current diet, Continue oral nutrition supplement  Nutrition Education and Counseling: No recommendations at this time  Coordination of Nutrition Care: Continue to monitor while inpatient, Interdisciplinary rounds    Goals:  Provide PO for comfort/as tolerated       Nutrition Monitoring and Evaluation:   Behavioral-Environmental Outcomes: None identified  Food/Nutrient Intake Outcomes: Food and nutrient intake, Supplement intake  Physical Signs/Symptoms Outcomes: Biochemical data    Discharge Planning:    Continue oral nutrition supplement     Electronically signed by Thalia Hopkins RD on 31/9/1405  Contact: [phone] or via [employer]

## 2021-12-08 NOTE — PROGRESS NOTES
Roderick Estrada [address]  [address] [facility], [place], [address] [facility]  [phone]      Medical Progress Note      NAME:         Pal Dixon   :        1928  MRM:        [id number]    Date of service: 2021      Subjective: Norleen Goodell, she feels ok today without complaints. Objective:    Vital Signs:    Visit Vitals  /75 (BP 1 Location: Left upper arm, BP Patient Position: At rest)   Pulse 60   Temp 97.5 °F (36.4 °C)   Resp 17   Ht 5' 4.02\" (1.626 m)   Wt 45.6 kg (100 lb 8.5 oz)   SpO2 95%   BMI 17.25 kg/m²          Intake/Output Summary (Last 24 hours) at 2021 1824  Last data filed at 2021 1515  Gross per 24 hour   Intake 70 ml   Output 550 ml   Net -480 ml        Physical Examination:    General:   Weak, cachectic and frail looking, not in acute distress    Eyes:   pink conjunctivae, PERRLA with no discharge. ENT:   no ottorrhea or rhinorrhea with dry mucous membranes  Neck: no masses, thyroid non-tender and trachea central.  Pulm: decreased but clear breath sounds without crackles or wheezes  Card:  has regular and normal S1, S2 without thrills, bruits or peripheral edema  Abd:  Soft, non-tender, non-distended, normoactive bowel sounds   Musc:  No cyanosis, clubbing, atrophy or deformities. No calf tenderness  Skin:  No rashes, bruising. Redness over sacral bony prominence.    Neuro: Awake, dysarthric, generally a non focal exam  Psych:  Poor insight     Current Facility-Administered Medications   Medication Dose Route Frequency    pantoprazole (PROTONIX) 40 mg in 0.9% sodium chloride 10 mL injection  40 mg IntraVENous DAILY    ondansetron (ZOFRAN) injection 4 mg  4 mg IntraVENous Q6H PRN    metoprolol succinate (TOPROL-XL) XL tablet 12.5 mg  12.5 mg Oral DAILY    doxycycline (VIBRA-TABS) tablet 100 mg  100 mg Oral Q12H    levothyroxine (SYNTHROID) tablet 100 mcg  100 mcg Oral ACB    acetaminophen (TYLENOL) tablet 650 mg  650 mg Oral Q6H    atorvastatin (LIPITOR) tablet 10 mg  10 mg Oral DAILY    acetaminophen (TYLENOL) tablet 650 mg  650 mg Oral Q4H PRN    Or    acetaminophen (TYLENOL) solution 650 mg  650 mg Per NG tube Q4H PRN    Or    acetaminophen (TYLENOL) suppository 650 mg  650 mg Rectal Q4H PRN    aspirin tablet 325 mg  325 mg Oral DAILY    heparin (porcine) injection 5,000 Units  5,000 Units SubCUTAneous Q8H        Laboratory data and review:    Recent Labs     12/08/21  0209 12/07/21 0446 12/06/21  0435   WBC 21.4* 23.4* 24.8*   HGB 13.4 14.7 14.7   HCT 43.3 49.4* 47.8*   * 525* 489*     Recent Labs     12/07/21  1140 12/07/21 0446 12/06/21  0435    142 146*   K 4.6 6.2* 3.7   * 112* 115*   CO2 19* 28 27   GLU 74 84 97   BUN 40* 40* 39*   CREA 0.92 0.96 0.92   CA 9.2 9.0 8.7   ALB  --  1.9* 1.7*   ALT  --  42 41     No components found for: [place]    Diagnostics: Imaging studies have been reviewed      Assessment and Plan:    Nausea/Vomitting/ Abd pain:  ct abd pelvis wo any acute concerns. IV zofran PRN and IV PPI. Monitor     Leukocytosis (11/30/2021) POA: unclear as to why she has a persistent leukocytosis despite empiric IV antibiotics. CT scans chest, abdomen and pelvis unremarkable for any obvious focus of infection. No obvious wounds. Blood cultures neg. Peripheral smear shows a reactive picture. Lactate resolved. WBCs scan otherwise unremarkable other than a left popliteal uptake. No focal tenderness on exam. WBCs trended down overall but remains high. S/p empiric IV Cefepime and Metronidazole; doxy. No IV fluids given her low EF. ID evaluted. - F/u Heme labs; consider myeloproliferative disorder, results of studies will take 2 weeks    Acute CVA (cerebrovascular accident) ([facility] [address] (11/30/2021) / Dysarthria (11/29/2021) POA: MRi brain showed several small foci of acute infarction in the cerebellar vermis, left occipital lobe, and left parietal lobe.  Doppler carotids w/ right moderate stenosis and left mild stenosis. , A1c 5.7. Echo showed severe aortic sclerosis, EF 15-20%. No shunt. Seen by neurology. Continue Asprin, Lipitor, PT, OT and speech therapies. DNR and now plans for hospice on DC. Coronary atherosclerosis of native coronary artery / Elevated troponin (11/29/2021) /  Ischemic cardiomyopathy (11/30/2021) POA: Hx CAD MI 12/26/12- NC. Now with a mild troponin elevation. No overt ischemia. Echo showed a reduced EF of 15-20% with valvular disease. With frequent non sustained SVT. Monitor lytes and replete PRN. Change IV lopressor to oral toprol-xl. Continue Asprin, Lipitor. Cardiology following. Acute metabolic encephalopathy (67/33/3120) POA: likely due to the CVA vs suspected occult infection vs underlying dementia. Continue to monitor her clinical progress     Hypothyroidism (4/17/2013) POA: TSH is normal. Unclear if she is taking any medications. Monitor for now     Underweight (11/30/2021) POA: due to advanced age, likely poor intake.  Diet as tolerated when able                   Care Plan discussed with: Patient, Care Manager and Nursing Staff    Discussed:  Care Plan and D/C Planning    Prophylaxis:  Hep SQ    Anticipated Disposition:  SNF/LTC versus hospice           ___________________________________________________    Attending Physician:   Madeleine Kirby MD

## 2021-12-08 NOTE — PROGRESS NOTES
Problem: Mobility Impaired (Adult and Pediatric)  Goal: *Acute Goals and Plan of Care (Insert Text)  Description: FUNCTIONAL STATUS PRIOR TO ADMISSION: Patient was independent and active without use of DME.    HOME SUPPORT PRIOR TO ADMISSION: The patient lived alone with brother to provide assistance. Physical Therapy Goals  Revised 12/8/2021  1. Patient will move from supine to sit and sit to supine , scoot up and down, and roll side to side in bed with moderate assistance  within 7 day(s). 2.  Patient will transfer from bed to chair and chair to bed with moderate assistance  using the least restrictive device within 7 day(s). 3.  Patient will perform sit to stand with moderate assistance  within 7 day(s). 4.  Patient will ambulate with moderate assistance  for 50 feet with the least restrictive device within 7 day(s). Initiated 11/30/2021; not met need updated goals  1. Patient will move from supine to sit and sit to supine , scoot up and down, and roll side to side in bed with minimal assistance/contact guard assist within 7 day(s). 2.  Patient will transfer from bed to chair and chair to bed with minimal assistance/contact guard assist using the least restrictive device within 7 day(s). 3.  Patient will perform sit to stand with minimal assistance/contact guard assist within 7 day(s). 4.  Patient will ambulate with minimal assistance/contact guard assist for 100 feet with the least restrictive device within 7 day(s). Outcome: Progressing Towards Goal   PHYSICAL THERAPY TREATMENT: WEEKLY REASSESSMENT  Patient: Linda Meng (88 y.o. female)  Date: 12/8/2021  Primary Diagnosis: Altered mental state [R41.82]  Elevated troponin [R77.8]  Dysarthria [R47.1]        Precautions:   Fall, Bed Alarm, DNR, Skin      ASSESSMENT  Patient continues with skilled PT services and is progressing towards goals. Communicated with nurse cleared for therapy. Patient supine on bed when received.  Follow command oriented to person and place. Rolled on  the edge of bed mod assist x 2, supine to sit mod assist x 2, sit to stand mod assist x 2, ambulate lateral side steps towards the head of bed mod assist x 2. Sitting and balance poor heavy posterior lean during sitting and standing. Patient very fearful of falling. Initially attempted to assist OOB to chair however patient not safe to be OOB assisted supine on bed and reposition up high on bed and place bed to modified chair position activated bed alarm and notified nurse who agreed to monitor patient. Patient's progression toward goals since last assessment:fair   Current Level of Function Impacting Discharge (mobility/balance): mod assist x 2 with transfers and ambulation using a rolling walker      Functional Outcome Measure: The patient scored 0/10 on the wilson balance test outcome measure . Other factors to consider for discharge: fall         PLAN :  Goals have been updated based on progression since last assessment. Patient continues to benefit from skilled intervention to address the above impairments. Recommendations and Planned Interventions: bed mobility training, transfer training, gait training, therapeutic exercises, neuromuscular re-education, orthotic/prosthetic training, patient and family training/education, and therapeutic activities      Frequency/Duration: Patient will be followed by physical therapy:  5 times a week to address goals. Recommendation for discharge: (in order for the patient to meet his/her long term goals)  Therapy up to 5 days/week in SNF setting    This discharge recommendation:  Has been made in collaboration with the attending provider and/or case management    IF patient discharges home will need the following DME: to be determined (TBD)         SUBJECTIVE:   Patient stated ok.     OBJECTIVE DATA SUMMARY:   HISTORY:    Past Medical History:   Diagnosis Date    Acute MI ([facility] [place] [address]     Anxiety     CAD (coronary artery disease)     Hypertension      Past Surgical History:   Procedure Laterality Date    CARDIAC SURG PROCEDURE UNLIST      HX GYN      hysterectomy    HX ORTHOPAEDIC         Personal factors and/or comorbidities impacting plan of care:     Home Situation  Home Environment: Private residence  # Steps to Enter: 3  One/Two Story Residence: One story  Living Alone: Yes  Support Systems: Other Family Member(s)  Patient Expects to be Discharged to[de-identified] Unknown  Current DME Used/Available at Home: None  Tub or Shower Type: Shower    EXAMINATION/PRESENTATION/DECISION MAKING:   Critical Behavior:  Neurologic State: Alert  Orientation Level: Oriented to person, Disoriented to place, Disoriented to situation, Disoriented to time  Cognition: Follows commands  Safety/Judgement: Awareness of environment, Decreased awareness of need for assistance, Decreased awareness of need for safety, Decreased insight into deficits, Fall prevention  Hearing: Auditory  Auditory Impairment: Hard of hearing, bilateral    Range Of Motion:  AROM: Generally decreased, functional           PROM: Within functional limits           Strength:    Strength: Generally decreased, functional                    Tone & Sensation:                                  Coordination:  Coordination: Generally decreased, functional  Vision:      Functional Mobility:  Bed Mobility:  Rolling: Moderate assistance; Assist x2  Supine to Sit: Moderate assistance; Assist x2  Sit to Supine: Moderate assistance; Assist x2  Scooting: Moderate assistance; Assist x2  Transfers:  Sit to Stand: Moderate assistance; Assist x2  Stand to Sit: Moderate assistance; Assist x2  Stand Pivot Transfers: Moderate assistance; Assist x2                    Balance:   Sitting: Impaired; With support  Sitting - Static: Poor (constant support)  Sitting - Dynamic: Poor (constant support)  Standing: Impaired; Pull to stand;  With support  Standing - Static: Constant support; Poor  Standing - Dynamic : Constant support; Poor  Ambulation/Gait Training:  Distance (ft): 2 Feet (ft) (lateral side steps)  Assistive Device: Walker, rolling; Gait belt  Ambulation - Level of Assistance: Moderate assistance; Assist x2     Gait Description (WDL): Exceptions to WDL  Gait Abnormalities: Antalgic; Path deviations; Step to gait        Base of Support: Widened     Speed/Melia: Fluctuations; Slow  Step Length: Right shortened; Left shortened                      Therapeutic Exercises:   Educate and instructed patient to continue active range of motion exercise on both legs while up on chair or on bed multiple times. Recommend patient to be up on the chair at least 3 times a day every meal times as tolerated. Functional Measure:  Dia Balance Test:    Sitting to Standin  Standing Unsupported: 0  Sitting with Back Unsupported: 0  Standing to Sittin  Transfers: 0  Standing Unsupported with Eyes Closed: 0  Standing Unsupported with Feet Together: 0  Reach Forward with Outstretched Arm: 0   Object: 0  Turn to Look Over Shoulders: 0  Turn 360 Degrees: 0  Alternate Foot on Step/Stool: 0  Standing Unsupported One Foot in Front: 0  Stand on One Le  Total: 0/56         56=Maximum possible score;   0-20=High fall risk  21-40=Moderate fall risk   41-56=Low fall risk              Activity Tolerance:   Good    After treatment patient left in no apparent distress:   Supine in bed, Heels elevated for pressure relief, Call bell within reach, Bed / chair alarm activated, and Side rails x 3    COMMUNICATION/EDUCATION:   The patients plan of care was discussed with: Occupational therapist, Registered nurse, and Case management. Fall prevention education was provided and the patient/caregiver indicated understanding. Thank you for this referral.  Benito Ojeda, PT,WCC.    Time Calculation: 24 mins

## 2021-12-09 NOTE — PROGRESS NOTES
Bedside and Verbal shift change report given to Josefina (oncoming nurse) by ALBERT Rawls RN (offgoing nurse). Report included the following information SBAR, Kardex, Intake/Output, MAR, Recent Results and Cardiac Rhythm NSR w PVCs.

## 2021-12-09 NOTE — PROGRESS NOTES
12/9/2021   CARE MANAGEMENT NOTE: CM reviewed EMR for clinical updates.  Pt was admitted with acute CVA. Reportedly, pt had been living alone in an apt. Pt's brother, Candelaria Flynn ([phone]) is the primary family contact. Nephew Fidelina Jones ([phone])  Nislade Thomas ([phone])     RUR 14%; LOS 9 days     Transition Plan of Care:  1.  Neurology, Cardiology, ID, Palliative following for medical management  2.  SNF - [facility] (denied). Additional referrals sent to [address] [place] [address], [facility], [facility], [facility], [facility], and [facility])  Pt does not have Medicaid so hospice at at nursing home is not an option 2/2 pt/family unable to pay room and board daily rate. 3.  Respiratory panel negative on 12/3  4.  Mode of transportation to be determined     CM will continue to follow pt for SNF  WILL Armstrong

## 2021-12-09 NOTE — PROGRESS NOTES
Bedside and Verbal shift change report given to BILL Mckeon (oncoming nurse) by Mitesh Joseph (offgoing nurse). Report included the following information SBAR, Kardex, Intake/Output, MAR, Recent Results and Med Rec Status.

## 2021-12-09 NOTE — PROGRESS NOTES
Occupational therapy note:  Orders received, chart reviewed. Attempted to see patient for OT treatment. Patient received in bed, sleeping. Patient declined activity at this time reporting having just awakened and not up for participation at this time. Noted food tray at bedside. Therapist offered assistance for feeding and patient declined. Will follow up with patient at later time. Diane Mccall, MS OTR/L

## 2021-12-09 NOTE — PROGRESS NOTES
[facility] [address]  [address], [place], [address] [facility]  [phone]      Medical Progress Note      NAME:         Elkin Batista   :        1928  MRM:        [id number]    Date of service: 2021      Subjective: Laurie Otero, she feels ok today without complaints. Objective:    Vital Signs:    Visit Vitals  /67 (BP 1 Location: Left upper arm, BP Patient Position: At rest)   Pulse 79   Temp 97.6 °F (36.4 °C)   Resp 18   Ht 5' 4.02\" (1.626 m)   Wt 45.6 kg (100 lb 8.5 oz)   SpO2 94%   BMI 17.25 kg/m²          Intake/Output Summary (Last 24 hours) at 2021 1806  Last data filed at 2021 0919  Gross per 24 hour   Intake 240 ml   Output 700 ml   Net -460 ml        Physical Examination:    General:   Weak, cachectic and frail looking, not in acute distress    Eyes:   pink conjunctivae, PERRLA with no discharge. ENT:   no ottorrhea or rhinorrhea with dry mucous membranes  Neck: no masses, thyroid non-tender and trachea central.  Pulm: decreased but clear breath sounds without crackles or wheezes  Card:  has regular and normal S1, S2 without thrills, bruits or peripheral edema  Abd:  Soft, non-tender, non-distended, normoactive bowel sounds   Musc:  No cyanosis, clubbing, atrophy or deformities. No calf tenderness  Skin:  No rashes, bruising. Redness over sacral bony prominence.    Neuro: Awake, dysarthric, generally a non focal exam  Psych:  Poor insight     Current Facility-Administered Medications   Medication Dose Route Frequency    pantoprazole (PROTONIX) 40 mg in 0.9% sodium chloride 10 mL injection  40 mg IntraVENous DAILY    ondansetron (ZOFRAN) injection 4 mg  4 mg IntraVENous Q6H PRN    metoprolol succinate (TOPROL-XL) XL tablet 12.5 mg  12.5 mg Oral DAILY    levothyroxine (SYNTHROID) tablet 100 mcg  100 mcg Oral ACB    acetaminophen (TYLENOL) tablet 650 mg  650 mg Oral Q6H    atorvastatin (LIPITOR) tablet 10 mg  10 mg Oral DAILY    acetaminophen (TYLENOL) tablet 650 mg  650 mg Oral Q4H PRN    Or    acetaminophen (TYLENOL) solution 650 mg  650 mg Per NG tube Q4H PRN    Or    acetaminophen (TYLENOL) suppository 650 mg  650 mg Rectal Q4H PRN    aspirin tablet 325 mg  325 mg Oral DAILY    heparin (porcine) injection 5,000 Units  5,000 Units SubCUTAneous Q8H        Laboratory data and review:    Recent Labs     12/09/21  0013 12/08/21  0209 12/07/21  0446   WBC 21.0* 21.4* 23.4*   HGB 14.2 13.4 14.7   HCT 46.3 43.3 49.4*   * 479* 525*     Recent Labs     12/09/21  0013 12/07/21  1140 12/07/21  0446    142 142   K 4.3 4.6 6.2*   * 113* 112*   CO2 24 19* 28   * 74 84   BUN 40* 40* 40*   CREA 0.87 0.92 0.96   CA 8.5 9.2 9.0   ALB 1.9*  --  1.9*   ALT 80*  --  42     No components found for: Ramin [place]    Diagnostics: Imaging studies have been reviewed      Assessment and Plan:    Nausea/Vomitting/ Abd pain:  ct abd pelvis wo any acute concerns. IV zofran PRN and IV PPI. Monitor     Leukocytosis (11/30/2021) POA: unclear as to why she has a persistent leukocytosis despite empiric IV antibiotics. CT scans chest, abdomen and pelvis unremarkable for any obvious focus of infection. No obvious wounds. Blood cultures neg. Peripheral smear shows a reactive picture. Lactate resolved. WBCs scan otherwise unremarkable other than a left popliteal uptake. No focal tenderness on exam. WBCs trended down overall but remains high. S/p empiric IV Cefepime and Metronidazole; doxy. No IV fluids given her low EF. ID evaluted. - F/u Heme labs; consider myeloproliferative disorder, results of studies will take 2 weeks    Acute CVA (cerebrovascular accident) ([facility] [address] (11/30/20n the cerebellar vermis, left occipital lobe, and left parietal lobe. Doppler carotids w21) / Dysarthria (11/29/2021) POA: MRi brain showed several small foci of acute infarction i/ right moderate stenosis and left mild stenosis.  LDL 100, A1c 5.7. Echo showed severe aortic sclerosis, EF 15-20%. No shunt. Seen by neurology. Continue Asprin, Lipitor, PT, OT and speech therapies. DNR and now plans for hospice on DC vs SNF, though I do not think she has much rehab potential.     Coronary atherosclerosis of native coronary artery / Elevated troponin (11/29/2021) /  Ischemic cardiomyopathy (11/30/2021) POA: Hx CAD MI 12/26/12- NC. Now with a mild troponin elevation. No overt ischemia. Echo showed a reduced EF of 15-20% with valvular disease. With frequent non sustained SVT. Monitor lytes and replete PRN. Change IV lopressor to oral toprol-xl. Continue Asprin, Lipitor. Cardiology following. Acute metabolic encephalopathy (46/03/3358) POA: likely due to the CVA vs suspected occult infection vs underlying dementia. Continue to monitor her clinical progress     Hypothyroidism (4/17/2013) POA: TSH is normal. Unclear if she is taking any medications. Monitor for now     Underweight (11/30/2021) POA: due to advanced age, likely poor intake.  Diet as tolerated when able                   Care Plan discussed with: Patient, Care Manager and Nursing Staff    Discussed:  Care Plan and D/C Planning    Prophylaxis:  Hep SQ    Anticipated Disposition:  SNF/LTC versus hospice           ___________________________________________________    Attending Physician:   Bety Tenorio MD

## 2021-12-09 NOTE — WOUND CARE
Wound Consult: Follow up Visit. Chart reviewed. Assessment completed with Brea Quarles RN  Patient is resting on a lenny bed with MICHEAL mattress. Heels off loaded with pillows. Patient is awake,cooperative; requires 2 assits to move side to side in bed. Assessment:  Sacrum - blanching redness of intact skin; pink/red, blanching,remains tender for patient; skin remains intact. Right lower buttock intact, pink/blanching. Heels pink, blanching. Spine with out redness. Hips with out redness; has some dry excoriations on right hip from scratching  Treatment:  Sacrum- hydrocolloid replaced and sacral foam.  Elevated heels on pillows  Repositioned onto right side  Wound Recommendations:  Continue current orders  Skin Care / PI Prevention Recommendations:  1. Minimize friction/shear: minimize layers of linen/pads under patient. 2. Off load pressure/reposition:  turn and reposition approximately every 2 hours; float heels with pillows or use off loading heel boots; waffle cushion for sitting; position wedge. 3. Manage Moisture - keep skin folds dry; incontinence skin care with incontinence wipes; purwick in use to help contain urine. 4. Continue to monitor nutrition, pain, and skin risk scale, and skin assessment. Plan: We will continue to reassess and as needed. Please re-consult should concerns arise despite continued skin/PI prevention measures.     [address] [facility] [place], Wound / [address] [facility] Healing Office [phone]

## 2021-12-09 NOTE — PROGRESS NOTES
Attempted to work with the patient for Physical Therapy, chart reviewed and discussed with nurse cleared for therapy. Patient supine on bed when received offered to do therapy, patient politely declined stating not feeling well just woke up. Patient requested to be reposition up high on the bed however after repositioning patient still declined therapy. We will continue to follow up with the patient for therapy thank you.

## 2021-12-10 NOTE — PROGRESS NOTES
Bedside and Verbal shift change report given to Rafiq Lizarraga RN (oncoming nurse) by Evelyne Post RN  (offgoing nurse). Report included the following information SBAR, Kardex, Intake/Output, MAR, Recent Results and Med Rec Status.

## 2021-12-10 NOTE — PROGRESS NOTES
Roderick Adkins [facility]  [address], [place], [address] [facility]  [phone]      Medical Progress Note      NAME:         Kalyan Jiménez   :        1928  MRM:        [id number]    Date of service: 12/10/2021      Subjective: Marquez Dyanaharriet, she feels ok today without complaints. Objective:    Vital Signs:    Visit Vitals  BP (!) 144/84 (BP 1 Location: Left upper arm, BP Patient Position: Lying; At rest)   Pulse 67   Temp 97.4 °F (36.3 °C)   Resp 18   Ht 5' 4.02\" (1.626 m)   Wt 45.6 kg (100 lb 8.5 oz)   SpO2 94%   BMI 17.25 kg/m²          Intake/Output Summary (Last 24 hours) at 12/10/2021 1437  Last data filed at 2021 1800  Gross per 24 hour   Intake 180 ml   Output    Net 180 ml        Physical Examination:    General:   Weak, cachectic and frail looking, not in acute distress    Eyes:   pink conjunctivae, PERRLA with no discharge. ENT:   no ottorrhea or rhinorrhea with dry mucous membranes  Neck: no masses, thyroid non-tender and trachea central.  Pulm: decreased but clear breath sounds without crackles or wheezes  Card:  has regular and normal S1, S2 without thrills, bruits or peripheral edema  Abd:  Soft, non-tender, non-distended, normoactive bowel sounds   Musc:  No cyanosis, clubbing, atrophy or deformities. No calf tenderness  Skin:  No rashes, bruising. Redness over sacral bony prominence.    Neuro: Awake, dysarthric, generally a non focal exam  Psych:  Poor insight     Current Facility-Administered Medications   Medication Dose Route Frequency    pantoprazole (PROTONIX) 40 mg in 0.9% sodium chloride 10 mL injection  40 mg IntraVENous DAILY    ondansetron (ZOFRAN) injection 4 mg  4 mg IntraVENous Q6H PRN    metoprolol succinate (TOPROL-XL) XL tablet 12.5 mg  12.5 mg Oral DAILY    levothyroxine (SYNTHROID) tablet 100 mcg  100 mcg Oral ACB    acetaminophen (TYLENOL) tablet 650 mg  650 mg Oral Q6H    atorvastatin (LIPITOR) tablet 10 mg  10 mg Oral DAILY    acetaminophen (TYLENOL) tablet 650 mg  650 mg Oral Q4H PRN    Or    acetaminophen (TYLENOL) solution 650 mg  650 mg Per NG tube Q4H PRN    Or    acetaminophen (TYLENOL) suppository 650 mg  650 mg Rectal Q4H PRN    aspirin tablet 325 mg  325 mg Oral DAILY    heparin (porcine) injection 5,000 Units  5,000 Units SubCUTAneous Q8H        Laboratory data and review:    Recent Labs     12/09/21  0013 12/08/21  0209   WBC 21.0* 21.4*   HGB 14.2 13.4   HCT 46.3 43.3   * 479*     Recent Labs     12/10/21  0744 12/09/21  0013    145   K 4.3 4.3   * 113*   CO2 27 24   * 111*   BUN 36* 40*   CREA 0.81 0.87   CA 9.0 8.5   MG 2.3  --    PHOS 2.6  --    ALB  --  1.9*   ALT  --  80*     No components found for: [place]    Diagnostics: Imaging studies have been reviewed      Assessment and Plan:    Nausea/Vomitting/ Abd pain:  ct abd pelvis wo any acute concerns. IV zofran PRN and IV PPI. Monitor     Leukocytosis (11/30/2021) POA: unclear as to why she has a persistent leukocytosis despite empiric IV antibiotics. CT scans chest, abdomen and pelvis unremarkable for any obvious focus of infection. No obvious wounds. Blood cultures neg. Peripheral smear shows a reactive picture. Lactate resolved. WBCs scan otherwise unremarkable other than a left popliteal uptake. No focal tenderness on exam. WBCs trended down overall but remains high. S/p empiric IV Cefepime and Metronidazole; doxy. No IV fluids given her low EF. ID evaluted. - F/u Heme labs; consider myeloproliferative disorder, results of studies will take 2 weeks    Acute CVA (cerebrovascular accident) ([facility] [address] (11/30/20n the cerebellar vermis, left occipital lobe, and left parietal lobe. Doppler carotids w21) / Dysarthria (11/29/2021) POA: MRi brain showed several small foci of acute infarction i/ right moderate stenosis and left mild stenosis. , A1c 5.7.  Echo showed severe aortic sclerosis, EF 15-20%. No shunt. Seen by neurology. Continue Asprin, Lipitor, PT, OT and speech therapies. DNR and now plans for hospice but does not have JORGE A. Coronary atherosclerosis of native coronary artery / Elevated troponin (11/29/2021) /  Ischemic cardiomyopathy (11/30/2021) POA: Hx CAD MI 12/26/12- NC. Now with a mild troponin elevation. No overt ischemia. Echo showed a reduced EF of 15-20% with valvular disease. With frequent non sustained SVT. Monitor lytes and replete PRN. Change IV lopressor to oral toprol-xl. Continue Asprin, Lipitor. Acute metabolic encephalopathy (09/41/2795) POA: likely due to the CVA vs suspected occult infection vs underlying dementia.  Awaiting hospice                  Care Plan discussed with: Patient, Care Manager and Nursing Staff    Discussed:  Care Plan and D/C Planning    Prophylaxis:  Hep SQ    Anticipated Disposition:  SNF/LTC versus hospice           ___________________________________________________    Attending Physician:   Cherrie Vee MD

## 2021-12-10 NOTE — PROGRESS NOTES
Patient noted to have Super Ventricular Tachycardia with a rate 170s. At 01:13 MD notified and ordered EKG. EKG showed sinus rhythm with premature super ventricular tachycardia. Electrolyte labs ordered.

## 2021-12-10 NOTE — PROGRESS NOTES
Problem: Self Care Deficits Care Plan (Adult)  Goal: *Acute Goals and Plan of Care (Insert Text)  Description:   FUNCTIONAL STATUS PRIOR TO ADMISSION: Patient was independent and active without use of DME.     HOME SUPPORT: The patient lived alone with no local support. Occupational Therapy Goals  Weekly re assessment 12/8/2021- continue set goals   Initiated 11/30/2021  1. Patient will perform grooming with minimal assistance within 7 day(s). 2.  Patient will perform upper body dressing and bathing with moderate assistance  within 7 day(s). 3.  Patient will perform lower body dressing and bathing with moderate assistance  within 7 day(s). 4.  Patient will perform toilet transfers to [facility] with moderate assistance within 7 day(s). 5.  Patient will perform all aspects of toileting with moderate assistance  within 7 day(s). 6.  Patient will participate in upper extremity therapeutic exercise/activities with minimum assistance for 10 minutes within 7 day(s). 7.  Patient will engage in full Fugl-Barrett Assessment of Upper Extremity within 7 days. Outcome: Progressing Towards Goal   OCCUPATIONAL THERAPY TREATMENT  Patient: Linda Meng (60 y.o. female)  Date: 12/10/2021  Diagnosis: Altered mental state [R41.82]  Elevated troponin [R77.8]  Dysarthria [R47.1]   Acute CVA (cerebrovascular accident) [facility])       Precautions: Fall, Bed Alarm, DNR, Skin  Chart, occupational therapy assessment, plan of care, and goals were reviewed. ASSESSMENT  Patient continues with skilled OT services and is slowly progressing towards goals. .Pt agreeable to OT. Pt rolling with mod to max assist limited use of R UE. Pt max assist of 2 supine to sit. Pt progressed to min assist sitting briefly on EOB in prep for ADL's but than pt became very anxious. Pt unable to tolerate sitting longer than approx 1 minute. Pt max of 2 to return to supine. Pt repositioned comfortably in bed. Family present for tx, pt encouraged to eat however decreased appetite. Current Level of Function Impacting Discharge (ADLs): max assist LB bathe/dress    Other factors to consider for discharge:          PLAN :  Patient continues to benefit from skilled intervention to address the above impairments. Continue treatment per established plan of care to address goals. Recommend with staff: bed in chair position for ADL's, there ex, there act    Recommend next OT session: cont towards goals    Recommendation for discharge: (in order for the patient to meet his/her long term goals)  Therapy up to 5 days/week in SNF setting    This discharge recommendation:  Has not yet been discussed the attending provider and/or case management    IF patient discharges home will need the following DME:        SUBJECTIVE:   Patient stated     OBJECTIVE DATA SUMMARY:   Cognitive/Behavioral Status:  Neurologic State: Confused; Eyes open spontaneously  Orientation Level: Disoriented to place; Disoriented to situation; Disoriented to time; Oriented to person  Cognition: Follows commands; Decreased attention/concentration; Poor safety awareness             Functional Mobility and Transfers for ADLs:  Bed Mobility:  Supine to Sit: Maximum assistance; Assist x2  Sit to Supine: Maximum assistance; Assist x2  Scooting: Maximum assistance    Transfers:   Not tested          Balance:  Sitting: Impaired  Sitting - Static: Fair (occasional);  Poor (constant support)    ADL Intervention:                 Lower Body Bathing  Bathing Assistance: Maximum assistance         Lower Body Dressing Assistance  Dressing Assistance: Maximum assistance         Activity Tolerance:   Poor    After treatment patient left in no apparent distress:   Supine in bed and Call bell within reach    COMMUNICATION/COLLABORATION:   The patients plan of care was discussed with: Physical therapy assistant and Occupational therapist.     VASYL Amador  Time Calculation: 20 mins

## 2021-12-10 NOTE — PROGRESS NOTES
Bedside and Verbal shift change report given to Neena Rascon RN (oncoming nurse) by Venancio Arevalo RN (offgoing nurse). Report included the following information SBAR, Kardex, Intake/Output, MAR and Recent Results.

## 2021-12-10 NOTE — PROGRESS NOTES
12/10/2021   CARE MANAGEMENT NOTE: CM reviewed EMR for clinical updates.  Pt was admitted with acute CVA. Reportedly, pt had been living alone in an apt. Pt's brother, iFona Ferrari ([phone]) is the primary family contact. Nephew Jesse Dotson ([phone])  Niece Myrna Stallworth ([phone])     RUR 14%; CLYDE 69 KAVC     Transition Plan of Care:  1.  Neurology, Cardiology, ID, Palliative following for medical management  2.  SNF - [facility] (denied). [address] [address], [facility], [address] [facility], [address] [place] [address] [address], [place], [place], and [facility]. SNFs are denying 2/2 pt appears to need long term Medicaid bed after skilled rehab. Pt does not have Medicaid so hospice at a nursing home is not an option 2/2 pt/family unable to pay room and board daily rate. 3.  Respiratory panel negative on 12/3  4.  Mode of transportation to be determined     CM will continue to follow pt for SNF with long term Medicaid bed option  WILL Angulo

## 2021-12-11 NOTE — QM NOTE
Bedside and Verbal shift change report given to Parvin Lancaster RN (oncoming nurse) by Castro Pelaez RN (offgoing nurse). Report included the following information SBAR, Intake/Output, MAR, Accordion and Recent Results.

## 2021-12-11 NOTE — PROGRESS NOTES
12/11/2021  9:09 AM    CM reached out to [address] [address] and  to begin process for transfer to [facility], if appropriate, per request by MD Sebastian Driscoll. Patient currently awaiting placement with probable transition to LTC.     Edel Brownlee RN

## 2021-12-11 NOTE — PROGRESS NOTES
Roderick Adkins [facility] [place] [address]  [address] [facility], [address] [place] [address]  [phone]      Medical Progress Note      NAME:         Teresa Decker   :        1928  MRM:        [id number]    Date of service: 2021      Subjective: Denise Chen, feels well. No issues       Objective:    Vital Signs:    Visit Vitals  BP (!) 147/85 (BP 1 Location: Left upper arm, BP Patient Position: At rest)   Pulse 68   Temp 97.9 °F (36.6 °C)   Resp 18   Ht 5' 4.02\" (1.626 m)   Wt 47.9 kg (105 lb 9.6 oz)   SpO2 92%   BMI 18.12 kg/m²          Intake/Output Summary (Last 24 hours) at 2021 1417  Last data filed at 2021 0345  Gross per 24 hour   Intake    Output 50 ml   Net -50 ml        Physical Examination:    General:   Weak, cachectic and frail looking, not in acute distress    Eyes:   pink conjunctivae, PERRLA with no discharge. ENT:   no ottorrhea or rhinorrhea with dry mucous membranes  Neck: no masses, thyroid non-tender and trachea central.  Pulm: decreased but clear breath sounds without crackles or wheezes  Card:  has regular and normal S1, S2 without thrills, bruits or peripheral edema  Abd:  Soft, non-tender, non-distended, normoactive bowel sounds   Musc:  No cyanosis, clubbing, atrophy or deformities. No calf tenderness  Skin:  No rashes, bruising. Redness over sacral bony prominence.    Neuro: Awake, dysarthric, generally a non focal exam  Psych:  Poor insight     Current Facility-Administered Medications   Medication Dose Route Frequency    pantoprazole (PROTONIX) tablet 40 mg  40 mg Oral ACB    ondansetron (ZOFRAN) injection 4 mg  4 mg IntraVENous Q6H PRN    metoprolol succinate (TOPROL-XL) XL tablet 12.5 mg  12.5 mg Oral DAILY    levothyroxine (SYNTHROID) tablet 100 mcg  100 mcg Oral ACB    acetaminophen (TYLENOL) tablet 650 mg  650 mg Oral Q6H    atorvastatin (LIPITOR) tablet 10 mg  10 mg Oral DAILY    acetaminophen (TYLENOL) tablet 650 mg  650 mg Oral Q4H PRN    Or    acetaminophen (TYLENOL) solution 650 mg  650 mg Per NG tube Q4H PRN    Or    acetaminophen (TYLENOL) suppository 650 mg  650 mg Rectal Q4H PRN    aspirin tablet 325 mg  325 mg Oral DAILY    heparin (porcine) injection 5,000 Units  5,000 Units SubCUTAneous Q8H        Laboratory data and review:    Recent Labs     12/09/21  0013   WBC 21.0*   HGB 14.2   HCT 46.3   *     Recent Labs     12/10/21  0744 12/09/21  0013    145   K 4.3 4.3   * 113*   CO2 27 24   * 111*   BUN 36* 40*   CREA 0.81 0.87   CA 9.0 8.5   MG 2.3  --    PHOS 2.6  --    ALB  --  1.9*   ALT  --  80*     No components found for: [place]    Diagnostics: Imaging studies have been reviewed      Assessment and Plan:      Leukocytosis (11/30/2021) POA:  CT scans chest, abdomen and pelvis unremarkable for any obvious focus of infection. No obvious wounds. Blood cultures neg. Peripheral smear shows a reactive picture. Lactate resolved. WBCs scan otherwise unremarkable other than a left popliteal uptake. No focal tenderness on exam. WBCs trended down overall but remains high. S/p empiric IV Cefepime and Metronidazole; doxy. No IV fluids given her low EF. ID evaluted. - F/u Heme labs; consider myeloproliferative disorder, results of studies will take 2 weeks    Acute CVA (cerebrovascular accident) ([facility] [address] (11/30/20n the cerebellar vermis, left occipital lobe, and left parietal lobe. Doppler carotids w21) / Dysarthria (11/29/2021) POA: MRi brain showed several small foci of acute infarction i/ right moderate stenosis and left mild stenosis. , A1c 5.7. Echo showed severe aortic sclerosis, EF 15-20%. No shunt. Seen by neurology. Continue Asprin, Lipitor, PT, OT and speech therapies. DNR and now plans for hospice but does not have JORGE A.      Coronary atherosclerosis of native coronary artery / Elevated troponin (11/29/2021) /  Ischemic cardiomyopathy (11/30/2021) POA: Hx CAD MI 12/26/12- NC. Now with a mild troponin elevation. No overt ischemia. Echo showed a reduced EF of 15-20% with valvular disease. With frequent non sustained SVT. Monitor lytes and replete PRN. Change IV lopressor to oral toprol-xl. Continue Asprin, Lipitor. incr metop to 25    Acute metabolic encephalopathy (50/94/8317) POA: likely due to the CVA vs suspected occult infection vs underlying dementia.  Awaiting hospice                  Care Plan discussed with: Patient, Care Manager and Nursing Staff    Discussed:  Care Plan and D/C Planning    Prophylaxis:  Hep SQ    Anticipated Disposition:  SNF/LTC versus hospice           ___________________________________________________    Attending Physician:   Rahat Garrison MD

## 2021-12-11 NOTE — CONSULTS
Artie Metz MD. [facility]              Patient: Maude Willis  : 1928      Today's Date: 2021          HISTORY OF PRESENT ILLNESS:     History of Present Illness:  Reason for consult: SVT  Patient seen earlier this admission by Dr. Marietta Suggs and Dr. Nithya Beltre and see their consult notes     Patient says she feels OK. Looks frail. Physical Exam:  Visit Vitals  BP (!) 147/85 (BP 1 Location: Left upper arm, BP Patient Position: At rest)   Pulse 68   Temp 97.9 °F (36.6 °C)   Resp 18   Ht 5' 4.02\" (1.626 m)   Wt 105 lb 9.6 oz (47.9 kg)   SpO2 92%   BMI 18.12 kg/m²     Patient frail, NAD  HEENT:  Hearing intact, non-icteric, normocephalic, atraumatic. Neck Exam: Supple   Lung Exam: Clear to auscultation, even breath sounds. Cardiac Exam: Regular rate and rhythm with no murmur    Abdomen: Soft, non-tender   Extremities: MAW, No lower extremity edema. MSKTL: Overall good ROM ext  Skin: No significant rashes  Psych: Appropriate affect  Neuro - Grossly intact    Review of Symptoms:  Constitutional: Negative for fever   HEENT: Negative for vision changes. Respiratory: Negative for productive cough  Cardiovascular: Negative for syncope    Gastrointestinal: Negative for abdominal pain, melena  Genitourinary: Negative for dysuria  Skin: Negative for rash  Heme: No problems bleeding.   Neuro - recent CVA          LABS / OTHER STUDIES reviewed:     Lab Results   Component Value Date/Time    Sodium 145 12/10/2021 07:44 AM    Potassium 4.3 12/10/2021 07:44 AM    Chloride 115 (H) 12/10/2021 07:44 AM    CO2 27 12/10/2021 07:44 AM    Anion gap 3 (L) 12/10/2021 07:44 AM    Glucose 103 (H) 12/10/2021 07:44 AM    BUN 36 (H) 12/10/2021 07:44 AM    Creatinine 0.81 12/10/2021 07:44 AM    BUN/Creatinine ratio 44 (H) 12/10/2021 07:44 AM    GFR est AA >60 12/10/2021 07:44 AM    GFR est non-AA >60 12/10/2021 07:44 AM    Calcium 9.0 12/10/2021 07:44 AM       Lab Results   Component Value Date/Time    WBC 21.0 (H) 12/09/2021 12:13 AM    HGB 14.2 12/09/2021 12:13 AM    HCT 46.3 12/09/2021 12:13 AM    PLATELET 863 (H) 32/79/8766 12:13 AM    MCV 74.8 (L) 12/09/2021 12:13 AM           CARDIAC DIAGNOSTICS:     Cardiac Evaluation Includes:  I reviewed the results below. Echo 11/30/21 - LVEF 15-20%, Severe RV dysfunction, ROXANNE, AV sclerosis, Mod MR    Tele reviewed --> brief runs of NSVT (7 beats). Brief runs of SVT self limited - lasted 45 seconds at 135 bpm       ASSESSMENT AND PLAN:     Assessment and Plan:  1) Brief runs of SVT  - Tele reviewed -->  Brief runs of SVT self limited - lasted 45 seconds at 135 bpm   - Will cont BB, but increase to Toprol XL 25 mg daily     2) Acute CVA and encephalopathy   - cerebellar vermis, left occipital lobe, and left parietal lobe. - MRi brain showed several small foci of acute infarction  Seen by neurology. -  Continue Asprin, Lipitor, PT, OT and speech therapies. - DNR and some discussions for hospice?     3) Ischemic CM:     - Echo now shows severe LV dysfunction (EF 15-20%). - Given her advanced age with dementia and possible plans for hospice, she is poor candidate for aggressive therapies.    - Cont ASA, statin. - she seems volume compensated  - cont BB (for SVT as above)   - Will add low dose ACE-I (lisinopril 2.5 mg daily) as BP is running high (can stop if she goes into hospice)     4) Will sign off. Please call if any questions. Cammie Molina MD, [address].  [address], [address]  [place], [address] [place] [address]  Ph: [phone]   Ph [phone]

## 2021-12-11 NOTE — PROGRESS NOTES
[id number] Telemetry monitor called and patient had 7 beat run of VT. Pt asymptomatic, denies CP or other symptoms. /84. Will notify physician.

## 2021-12-12 NOTE — PROGRESS NOTES
Bedside and Verbal shift change report given to Faby Little RN (oncoming nurse) by Juno Cortes RN (offgoing nurse). Report included the following information SBAR, Kardex, Procedure Summary, Intake/Output, MAR, Accordion and Recent Results.

## 2021-12-12 NOTE — PROGRESS NOTES
Verbal shift change report given to Laureano Dockery RN (oncoming nurse) by Adela Anguiano RN (offgoing nurse). Report included the following information SBAR, Kardex, Procedure Summary, Intake/Output, MAR, Accordion, Recent Results and Med Rec Status.

## 2021-12-12 NOTE — PROGRESS NOTES
MD notified of elevated heart rate and completed EKG showing afib with RVR. Plan to increase metoprolol tomorrow and continue to monitor at this time.

## 2021-12-12 NOTE — PROGRESS NOTES
Bedside and Verbal shift change report given to Lin Moulton RN (oncoming nurse) by Hung Barlow RN (offgoing nurse). Report included the following information SBAR, Kardex, Intake/Output, MAR, Accordion and Recent Results.

## 2021-12-12 NOTE — PROGRESS NOTES
Roderick Adkins Sean [place] 79  [facility], [address] [place] [address]  [phone]      Medical Progress Note      NAME:         Phoebe Thibodeaux   :        1928  MRM:        [id number]    Date of service: 2021      Subjective: Maksim Patch, feels well. No issues. Brother at bedside, all questions answered       Objective:    Vital Signs:    Visit Vitals  /62 (BP 1 Location: Left upper arm, BP Patient Position: At rest)   Pulse 67   Temp 97.7 °F (36.5 °C)   Resp 20   Ht 5' 4.02\" (1.626 m)   Wt 47.9 kg (105 lb 9.6 oz)   SpO2 91%   BMI 18.12 kg/m²          Intake/Output Summary (Last 24 hours) at 2021 1434  Last data filed at 2021 1243  Gross per 24 hour   Intake 237 ml   Output 400 ml   Net -163 ml        Physical Examination:    General:   Weak, cachectic and frail looking, not in acute distress    Eyes:   pink conjunctivae, PERRLA with no discharge. ENT:   no ottorrhea or rhinorrhea with dry mucous membranes  Neck: no masses, thyroid non-tender and trachea central.  Pulm: decreased but clear breath sounds without crackles or wheezes  Card:  has regular and normal S1, S2 without thrills, bruits or peripheral edema  Abd:  Soft, non-tender, non-distended, normoactive bowel sounds   Musc:  No cyanosis, clubbing, atrophy or deformities. No calf tenderness  Skin:  No rashes, bruising. Redness over sacral bony prominence.    Neuro: Awake, dysarthric, generally a non focal exam  Psych:  Poor insight     Current Facility-Administered Medications   Medication Dose Route Frequency    pantoprazole (PROTONIX) tablet 40 mg  40 mg Oral ACB    ondansetron (ZOFRAN) injection 4 mg  4 mg IntraVENous Q6H PRN    metoprolol succinate (TOPROL-XL) XL tablet 12.5 mg  12.5 mg Oral DAILY    levothyroxine (SYNTHROID) tablet 100 mcg  100 mcg Oral ACB    acetaminophen (TYLENOL) tablet 650 mg  650 mg Oral Q6H    atorvastatin (LIPITOR) tablet 10 mg  10 mg Oral DAILY    acetaminophen (TYLENOL) tablet 650 mg  650 mg Oral Q4H PRN    Or    acetaminophen (TYLENOL) solution 650 mg  650 mg Per NG tube Q4H PRN    Or    acetaminophen (TYLENOL) suppository 650 mg  650 mg Rectal Q4H PRN    aspirin tablet 325 mg  325 mg Oral DAILY    heparin (porcine) injection 5,000 Units  5,000 Units SubCUTAneous Q8H        Laboratory data and review:    No results for input(s): WBC, HGB, HCT, PLT, HGBEXT, HCTEXT, PLTEXT, HGBEXT, HCTEXT, PLTEXT in the last 72 hours. Recent Labs     12/10/21  0744      K 4.3   *   CO2 27   *   BUN 36*   CREA 0.81   CA 9.0   MG 2.3   PHOS 2.6     No components found for: [place]    Diagnostics: Imaging studies have been reviewed      Assessment and Plan:      Leukocytosis (11/30/2021) POA:  CT scans chest, abdomen and pelvis unremarkable for any obvious focus of infection. No obvious wounds. Blood cultures neg. Peripheral smear shows a reactive picture. Lactate resolved. WBCs scan otherwise unremarkable other than a left popliteal uptake. No focal tenderness on exam. WBCs trended down overall but remains high. S/p empiric IV Cefepime and Metronidazole; doxy. No IV fluids given her low EF. ID evaluted. - F/u Heme labs; consider myeloproliferative disorder, results of studies will take 2 weeks    Acute CVA (cerebrovascular accident) ([facility] [address] (11/30/20n the cerebellar vermis, left occipital lobe, and left parietal lobe. Doppler carotids w21) / Dysarthria (11/29/2021) POA: MRi brain showed several small foci of acute infarction i/ right moderate stenosis and left mild stenosis. , A1c 5.7. Echo showed severe aortic sclerosis, EF 15-20%. No shunt. Seen by neurology. Continue Asprin, Lipitor, PT, OT and speech therapies. DNR and now plans for hospice but does not have JORGE A.      Coronary atherosclerosis of native coronary artery / Elevated troponin (11/29/2021) /  Ischemic cardiomyopathy (11/30/2021) POA: Hx CAD MI 12/26/12- NC. Now with a mild troponin elevation. No overt ischemia. Echo showed a reduced EF of 15-20% with valvular disease. With frequent non sustained SVT. Monitor lytes and replete PRN. Change IV lopressor to oral toprol-xl. Continue Asprin, Lipitor. incr metop to 25    Acute metabolic encephalopathy (07/63/8247) POA: likely due to the CVA vs suspected occult infection vs underlying dementia.  Awaiting hospice                  Care Plan discussed with: Patient, Care Manager and Nursing Staff    Discussed:  Care Plan and D/C Planning    Prophylaxis:  Hep SQ    Anticipated Disposition:  SNF/LTC versus hospice           ___________________________________________________    Attending Physician:   Morse Boxer, MD

## 2021-12-13 NOTE — PROGRESS NOTES
Bedside shift change report given to Lennox Holden RN (oncoming nurse) by Walter Gooden RN (offgoing nurse). Report included the following information SBAR, Kardex, Intake/Output, MAR and Recent Results.

## 2021-12-13 NOTE — PROGRESS NOTES
Roderick Romeroelsen [facility] 79  [facility], [address] [place] [address]  [phone]      Medical Progress Note      NAME:         Leanne Estrada   :        1928  MRM:        [id number]    Date of service: 2021      Subjective: Margret Lazier. Resting comfortably       Objective:    Vital Signs:    Visit Vitals  /65 (BP 1 Location: Left upper arm, BP Patient Position: Semi fowlers)   Pulse 87   Temp 96.8 °F (36 °C)   Resp 19   Ht 5' 4.02\" (1.626 m)   Wt 47.9 kg (105 lb 9.6 oz)   SpO2 95%   BMI 18.12 kg/m²          Intake/Output Summary (Last 24 hours) at 2021 1436  Last data filed at 2021 0730  Gross per 24 hour   Intake 195 ml   Output 150 ml   Net 45 ml        Physical Examination:    General:   Weak, cachectic and frail looking, not in acute distress    Eyes:   pink conjunctivae, PERRLA with no discharge. ENT:   no ottorrhea or rhinorrhea with dry mucous membranes  Neck: no masses, thyroid non-tender and trachea central.  Pulm: decreased but clear breath sounds without crackles or wheezes  Card:  has regular and normal S1, S2 without thrills, bruits or peripheral edema  Abd:  Soft, non-tender, non-distended, normoactive bowel sounds   Musc:  No cyanosis, clubbing, atrophy or deformities. No calf tenderness  Skin:  No rashes, bruising. Redness over sacral bony prominence.    Neuro: Awake, dysarthric, generally a non focal exam  Psych:  Poor insight     Current Facility-Administered Medications   Medication Dose Route Frequency    metoprolol succinate (TOPROL-XL) XL tablet 25 mg  25 mg Oral DAILY    pantoprazole (PROTONIX) tablet 40 mg  40 mg Oral ACB    ondansetron (ZOFRAN) injection 4 mg  4 mg IntraVENous Q6H PRN    levothyroxine (SYNTHROID) tablet 100 mcg  100 mcg Oral ACB    acetaminophen (TYLENOL) tablet 650 mg  650 mg Oral Q6H    atorvastatin (LIPITOR) tablet 10 mg  10 mg Oral DAILY    acetaminophen (TYLENOL) tablet 650 mg  650 mg Oral Q4H PRN    Or    acetaminophen (TYLENOL) solution 650 mg  650 mg Per NG tube Q4H PRN    Or    acetaminophen (TYLENOL) suppository 650 mg  650 mg Rectal Q4H PRN    aspirin tablet 325 mg  325 mg Oral DAILY    heparin (porcine) injection 5,000 Units  5,000 Units SubCUTAneous Q8H        Laboratory data and review:    No results for input(s): WBC, HGB, HCT, PLT, HGBEXT, HCTEXT, PLTEXT, HGBEXT, HCTEXT, PLTEXT in the last 72 hours. No results for input(s): NA, K, CL, CO2, GLU, BUN, CREA, CA, MG, PHOS, ALB, TBIL, ALT, INR, INREXT, INREXT in the last 72 hours. No lab exists for component: SGOT  No components found for: [place]    Diagnostics: Imaging studies have been reviewed      Assessment and Plan:        Acute CVA (cerebrovascular accident) ([facility] [address] (11/30/2021) / Dysarthria (11/29/2021) POA: MRI brain showed several small foci of acute infarction. , A1c 5.7. Echo showed severe aortic sclerosis, EF 15-20%. No shunt. Seen by neurology. Continue Asprin, Lipitor, PT, OT and speech therapies. DNR and now plans for hospice but does not have JORGE A. Underlying myeloproliferative disorder as well and do agree with plans for hospice    Leukocytosis (11/30/2021) POA:  S/p empiric IV Cefepime and Metronidazole; doxy. ELLEN positive so likely myeloproliferative disorder. Hospice pending     Coronary atherosclerosis of native coronary artery / Elevated troponin (11/29/2021) /  Ischemic cardiomyopathy (11/30/2021) POA: Hx CAD MI 12/26/12- [place]. Now with a mild troponin elevation. No overt ischemia. Echo showed a reduced EF of 15-20% with valvular disease. With frequent non sustained SVT. Monitor lytes and replete PRN. Change IV lopressor to oral toprol-xl. Continue Asprin, Lipitor. Cont metop to 25    Acute metabolic encephalopathy (26/25/6233) POA: likely due to the CVA vs suspected occult infection vs underlying dementia.  Awaiting hospice                  Care Plan discussed with: Patient, Care Manager and Nursing Staff    Discussed:  Care Plan and D/C Planning    Prophylaxis:  Hep SQ    Anticipated Disposition:  Hospice           ___________________________________________________    Attending Physician:   Chelsy Daniels MD

## 2021-12-13 NOTE — PROGRESS NOTES
Referral sent to [facility] FOR CONTINUING MED CARE [place] in [employer].  DANIEL Walker

## 2021-12-13 NOTE — PROGRESS NOTES
12/13/2021  3:32 PM  Pt discussed in IDR rounds,is continuing to require medical management for leukocytosis, Acute CVA, Coronary atherosclerosis of native coronary artery / Elevated troponinAcute metabolic encephalopathy  Transitions of Care Plan:  RUR 13 %  LOS 14 Days,     1. Medical management continues,   Neurology, Cardiology, ID, Palliative following  2.  CM consult for hospice, CM spoke w/ pt's brother Yvonne Ground he is agreeable, choice offered, verbal consent for referral to [facility], sent in [employer], they will contact home to discuss home hospice and options for caregiving    3. CM emailed [employer], Sabrina Severs, to confirm if Medicaid application has been completed   4. DC when medically stable   5. Transport TBD at [facility] vs [facility]  6.  CM will continue to follow and assist w/ DC needs   VAMSI Baird

## 2021-12-13 NOTE — PROGRESS NOTES
Problem: Mobility Impaired (Adult and Pediatric)  Goal: *Acute Goals and Plan of Care (Insert Text)  Description: FUNCTIONAL STATUS PRIOR TO ADMISSION: Patient was independent and active without use of DME.    HOME SUPPORT PRIOR TO ADMISSION: The patient lived alone with brother to provide assistance. Physical Therapy Goals  Revised 12/8/2021  1. Patient will move from supine to sit and sit to supine , scoot up and down, and roll side to side in bed with moderate assistance  within 7 day(s). 2.  Patient will transfer from bed to chair and chair to bed with moderate assistance  using the least restrictive device within 7 day(s). 3.  Patient will perform sit to stand with moderate assistance  within 7 day(s). 4.  Patient will ambulate with moderate assistance  for 50 feet with the least restrictive device within 7 day(s). Initiated 11/30/2021; not met need updated goals  1. Patient will move from supine to sit and sit to supine , scoot up and down, and roll side to side in bed with minimal assistance/contact guard assist within 7 day(s). 2.  Patient will transfer from bed to chair and chair to bed with minimal assistance/contact guard assist using the least restrictive device within 7 day(s). 3.  Patient will perform sit to stand with minimal assistance/contact guard assist within 7 day(s). 4.  Patient will ambulate with minimal assistance/contact guard assist for 100 feet with the least restrictive device within 7 day(s). Outcome: Progressing Towards Goal  Note:   PHYSICAL THERAPY TREATMENT  Patient: Sho Espino (27 y.o. female)  Date: 12/13/2021  Diagnosis: Altered mental state [R41.82]  Elevated troponin [R77.8]  Dysarthria [R47.1]   Acute CVA (cerebrovascular accident) [facility])       Precautions: Fall, Bed Alarm, DNR, Skin  Chart, physical therapy assessment, plan of care and goals were reviewed. ASSESSMENT  Patient continues with skilled PT services and progressing towards goals. Brother at bedside throughout session. Pt declined attempt to sit EOB despite max encouragement. Performed bed level thera ex in all available planes. Gentle manual resistance with BLE exercise. PT will continue to follow    Current Level of Function Impacting Discharge (mobility/balance): Max A     Other factors to consider for discharge:          PLAN :  Patient continues to benefit from skilled intervention to address the above impairments. Continue treatment per established plan of care. to address goals. Recommendation for discharge: (in order for the patient to meet his/her long term goals)  Therapy up to 5 days/week in SNF setting    This discharge recommendation:  Has been made in collaboration with the attending provider and/or case management    IF patient discharges home will need the following DME: to be determined (TBD)       SUBJECTIVE:   Patient stated i can't get up today.     OBJECTIVE DATA SUMMARY:   Critical Behavior:  Neurologic State: Drowsy  Orientation Level: Oriented X4  Cognition: Decreased command following  Safety/Judgement: Decreased insight into deficits  Functional Mobility Training:  Bed Mobility:                    Transfers:                                   Balance:     Ambulation/Gait Training:                                                        Stairs: Therapeutic Exercises:   Bed level thera ex for BLE/UE in all available planes  Pain Rating:      Activity Tolerance:   Good and requires rest breaks    After treatment patient left in no apparent distress:   Supine in bed, Call bell within reach, Bed / chair alarm activated, and Caregiver / family present    COMMUNICATION/COLLABORATION:   The patients plan of care was discussed with: Registered nurse.      [place] Number   Time Calculation: 17 mins

## 2021-12-14 NOTE — PROGRESS NOTES
PIV removed. Report called to Steven Cellar [phone] in SBAR format. Opportunity to ask questions given.

## 2021-12-14 NOTE — PALLIATIVE CARE DISCHARGE
The Palliative Medicine team was consulted as part of your / your loved one's care in the hospital. Our team is a supportive service; we strive to relieve suffering and improve quality of life. We reviewed advance care planning information, which includes the following:  Advance Care Planning 12/14/2021   Patient's Healthcare Decision Maker is: Named in scanned ACP document   Confirm Advance Directive Yes, on file   Patient Would Like to Complete Advance Directive Completed 11/30/2021   Does the patient have other document types Do Not Resuscitate     We reviewed / discussed your code status as: DNR     Full Code means perform CPR in the event of cardiac arrest     [facility] means do NOT perform CPR in the event of cardiac arrest     Partial Code means you have specific preferences, please discuss with your health care team     No Order means this issue was not addressed / resolved during your stay    You have a Durable Do Not Resuscitate Order in place, which should travel with you. When you are in a facility, this form should be placed on your chart. Once you are home, it is recommended that the [facility] form be placed in a visible location such as on the refrigerator or bedroom door. Because of the importance of this information, we are providing you with a printed copy to share with other healthcare providers after this hospitalization is complete. If any of the above information is incomplete or incorrect, please contact the Palliative Medicine team at [phone].

## 2021-12-14 NOTE — PROGRESS NOTES
Bedside shift change report given to [address]  RN (oncoming nurse) by Carlos Hutchinson RN (offgoing nurse). Report included the following information SBAR, Kardex, Intake/Output, MAR and Recent Results.

## 2021-12-14 NOTE — PROGRESS NOTES
Bedside and Verbal shift change report given to Christine Gurrola RN (oncoming nurse) by Florentin Haines RN (offgoing nurse). Report included the following information SBAR, Kardex, Procedure Summary, Intake/Output, MAR, Accordion, Recent Results and Med Rec Status.

## 2021-12-14 NOTE — DISCHARGE SUMMARY
Physician Discharge Summary     Patient ID:  Diego Cox  [id number]  23 y.o.  7/7/1928    Admit date: 11/29/2021    Discharge date and time: 12/14/2021    Admission Diagnoses: Altered mental state [R41.82]  Elevated troponin [R77.8]  Dysarthria [R47.1]    Discharge Diagnoses:    Principal Problem:    Acute CVA (cerebrovascular accident) ([facility] [address] (11/30/2021)    Active Problems:    Coronary atherosclerosis of native coronary artery (4/15/2013)      Overview: MI 12/26/12- NC      Hypothyroidism (4/17/2013)      Elevated troponin (11/29/2021)      Dysarthria (11/29/2021)      Ischemic cardiomyopathy (11/30/2021)      Acute metabolic encephalopathy (09/35/5435)      Leukocytosis (11/30/2021)      Underweight (11/30/2021)           Hospital Course:   Ms. Naida Davis is a 80 y.o. female who is admitted with altered mentation and slurred speech. EMS was called by someone who came to her house to check on her so we do not know her last known normal. History obtained primarily from chart. Patient's only complaint is weakness but she says that has been an ongoing issue. She states \"this happens at times\" but does not elaborate much on what she means despite probing. She was admitted for further evaluation and treatment of the following:      Acute CVA (cerebrovascular accident) ([facility] [address] (11/30/2021) / Dysarthria (11/29/2021) POA: MRI brain showed several small foci of acute infarction. , A1c 5.7. Echo showed severe aortic sclerosis, EF 15-20%. No shunt. Seen by neurology. Continue Asprin, Lipitor, PT, OT and speech therapies. She has continued to be very weak and after discussion with family and palliative, pt will discharge with hospice.      Leukocytosis (11/30/2021) POA:  S/p empiric IV Cefepime and Metronidazole; doxy. ELLEN positive so likely myeloproliferative disorder.  Hospice                  Coronary atherosclerosis of native coronary artery / Elevated troponin (11/29/2021) /  Ischemic cardiomyopathy (11/30/2021) POA: Hx CAD MI 12/26/12- NC. Now with a mild troponin elevation. No overt ischemia. Echo showed a reduced EF of 15-20% with valvular disease. With frequent non sustained SVT. Monitor lytes and replete PRN. Change IV lopressor to oral toprol-xl. Continue Asprin, Lipitor. Cont metop to 25     Acute metabolic encephalopathy (48/49/3222) POA: likely due to the CVA vs suspected occult infection vs underlying dementia. hospice        PCP: Landon Mills MD     Consults: Hematology/Oncology, Neurology, Palliative Care and Hospice    Condition of patient at discharge: stable    Discharge Exam:    Physical Exam:    General:   Weak, cachectic and frail looking, not in acute distress    Eyes:   pink conjunctivae, PERRLA with no discharge. ENT:   no ottorrhea or rhinorrhea with dry mucous membranes  Neck: no masses, thyroid non-tender and trachea central.  Pulm: decreased but clear breath sounds without crackles or wheezes  Card:  has regular and normal S1, S2 without thrills, bruits or peripheral edema  Abd:  Soft, non-tender, non-distended, normoactive bowel sounds   Musc:  No cyanosis, clubbing, atrophy or deformities. No calf tenderness  Skin:  No rashes, bruising. Redness over sacral bony prominence. Neuro: Awake, dysarthric, generally a non focal exam  Psych:  Poor insight         Patient Instructions:   Current Discharge Medication List      CONTINUE these medications which have NOT CHANGED    Details   metoprolol succinate (TOPROL-XL) 25 mg XL tablet Take 12.5 mg by mouth daily. levothyroxine (SYNTHROID) 100 mcg tablet Take 100 mcg by mouth Daily (before breakfast). aspirin delayed-release 81 mg tablet Take 81 mg by mouth daily. POTASSIUM CHLORIDE SR 10 MEQ TAB Take 10 mEq by mouth daily. Ramipril 5 mg Tab Take 5 mg by mouth daily. Signed:   Chaitanya Holley MD  12/14/2021  11:39 AM

## 2021-12-14 NOTE — PROGRESS NOTES
Called [facility] at [phone] in regards to missed  time of 1600. Time now is 1701 and [facility] stated that new  time would be at 1800.

## 2021-12-14 NOTE — PROGRESS NOTES
12/14/2021  12:45 PM  Transition of Care Plan to SNF/Rehab    SNF/Rehab Transition:  Patient has been accepted to [facility] and meets criteria for admission. Patient will transported by [facility]  and expected to leave at 4:00PM, transport packet has been completed and placed with bedside chart    Communication to Patient/Family:  Met with patient and brother Erasto Barr and they are agreeable to the transition plan. Communication to SNF/Rehab:  Bedside RN, Krunal Stauffer  has been notified to update the transition plan to the facility and call report (phone number [phone]  Rm 410 [address] Nurses   Discharge information has been updated on the AVS.     Discharge instructions to be fax'd to facility at [facility] # [phone]). Patient has been identified as part of the [employer].  For Care Coordination associated with that Bundle Program, please contact   Bundle information has been communication to    Nursing Please include all hard scripts for controlled substances, med rec and dc summary, and AVS in packet. Reviewed and confirmed with facility, [facility] , can manage the patient care needs for the following:     SNF/Rehab Transition:  Patient to follow-up with Home Health:  Elvira Evans, other ,none)  PCP/Specialist:Vik Kim MD    Reviewed and confirmed with facility, [facility]ka Dotson they can manage the patient care needs for the following:     [employer] with (X) only those applicable:    Medication:  [x]  Medications will be available at the facility  []  IV Antibiotics   []  Controlled Substance - hard copy to be sent with patient   []  Weekly Labs   Documents:  [] Hard RX  [x] MAR  [] Kardex  [x] AVS  []Transfer Summary  [x]Discharge   Equipment:  []  CPAP/BiPAP  []  Wound Vacuum  []  La or Urinary Device  []  PICC/Central Line  []  Nebulizer  []  Ventilator   Treatment:  []Isolation (for MRSA, VRE, etc.)  []Surgical Drain Management  []Tracheostomy Care  []Dressing Changes  []Dialysis with transportation and chair time . []PEG Care  [x]Oxygen 2L O2 continuous NC   []Daily Weights for Heart Failure   Dietary:  [x]Any diet limitations  []Tube Feedings   []Total Parenteral Management (TPN)   Eligible for Medicaid Long Term Services and Supports  Yes:  [x] Eligible for medical assistance or will become eligible within 180 days and UAI completed. [] Provider/Patient and/or support system has requested screening. [] UAI copy provided to patient or responsible party. [x] UAI unavailable at discharge will send once processed to SNF provider. [] UAI unavailable at discharged mailed to patient  No:   [] Private pay and is not financially eligible for Medicaid within the next 180 days. [] Reside out-of-state. [] A residents of a state owned/operated facility that is licensed  by [place] [address] [employer] or [facility]  [] Enrollment in [facility] hospice services  [] [address]  [] Patient /Family declines to have screening completed or provide financial information for screening     Financial Resources:  Medicaid    [x] Initiated and application pending   [] Full coverage     Advanced Care Plan:  []Surrogate Decision Maker of Care  []POA  []Communicated Code Status DNR     Other     Financial Resources:  Medicaid    [] Initiated and application pending   [] Full coverage     Advanced Care Plan:  []Surrogate Decision Maker of Care  []POA  [x]Communicated Code Status DNR   Other   Care Management Interventions  PCP Verified by CM: Yes (Dr. Charlotte Mclain)  Mode of Transport at Discharge:  Other (see comment) (per nephew Cinthia Solis)  Transition of Care Consult (CM Consult): Discharge Planning  Physical Therapy Consult: Yes  Occupational Therapy Consult: Yes  Speech Therapy Consult: Yes  Support Systems: Other Family Member(s)  Confirm Follow Up Transport: Family  The Plan for Transition of Care is Related to the Following Treatment Goals : R/O CVA  The Patient and/or Patient Representative was Provided with a Choice of Provider and Agrees with the Discharge Plan?: (S) Yes  Name of the Patient Representative Who was Provided with a Choice of Provider and Agrees with the Discharge Plan: Anayelinichole Borrero brother  Freedom of Choice List was Provided with Basic Dialogue that Supports the Patient's Individualized Plan of Care/Goals, Treatment Preferences and Shares the Quality Data Associated with the Providers?: (S) Yes  Discharge Location  Discharge Placement: Skilled nursing facility ([facility])  VAMSI Albert

## 2021-12-14 NOTE — PROGRESS NOTES
12/14/2021   9:25 AM  CM placed TC to [facility] admissions, LVM  VAMSI Ellis       8:34 AM  CM spoke w/ Dr Gilma Soto, pt medically stable for DC, barrier is placement   · A referral to [address] [place] [address] is pending  · [facility] has info session w/ family at 4:00 PM today  CM will continue to follow and assist w/ DC plan  VAMSI Ellis

## 2021-12-14 NOTE — PROGRESS NOTES
Medicaid LTSS Screening submitted for [facility] - SNF placement   Form ID # :[id number] DANIEL Ayers

## 2021-12-14 NOTE — PROGRESS NOTES
12/14/2021  12:02 PM  Eva; notice  given to pt's brother Kasandra Lundy via phone  Medicare pt has received, reviewed, 2nd IM letter informing them of their right to appeal the discharge. Copy has been placed on pt bedside chart.   VAMSI Norris

## 2023-10-24 NOTE — ED NOTES
This nurse called report to [facility] nurse. Report accepted with all questions answered. Topical Sulfur Applications Pregnancy And Lactation Text: This medication is considered safe during pregnancy and breast feeding secondary to limited systemic absorption.